# Patient Record
Sex: FEMALE | Race: WHITE | HISPANIC OR LATINO | Employment: STUDENT | URBAN - METROPOLITAN AREA
[De-identification: names, ages, dates, MRNs, and addresses within clinical notes are randomized per-mention and may not be internally consistent; named-entity substitution may affect disease eponyms.]

---
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
## 2018-01-02 ENCOUNTER — TELEPHONE (OUTPATIENT)
Dept: OBSTETRICS AND GYNECOLOGY | Facility: CLINIC | Age: 18
End: 2018-01-02

## 2018-01-02 DIAGNOSIS — N72 CERVICITIS: Primary | ICD-10-CM

## 2018-01-02 NOTE — TELEPHONE ENCOUNTER
Pt. Informed to come by the lab and drop off a urine for a repeat gc/ct .  She said okay and thank you

## 2018-01-10 ENCOUNTER — OFFICE VISIT (OUTPATIENT)
Dept: URGENT CARE | Facility: CLINIC | Age: 18
End: 2018-01-10
Payer: MEDICAID

## 2018-01-10 ENCOUNTER — LAB VISIT (OUTPATIENT)
Dept: LAB | Facility: HOSPITAL | Age: 18
End: 2018-01-10
Attending: OBSTETRICS & GYNECOLOGY
Payer: MEDICAID

## 2018-01-10 VITALS
SYSTOLIC BLOOD PRESSURE: 124 MMHG | DIASTOLIC BLOOD PRESSURE: 79 MMHG | HEART RATE: 99 BPM | HEIGHT: 66 IN | BODY MASS INDEX: 28.12 KG/M2 | OXYGEN SATURATION: 97 % | RESPIRATION RATE: 16 BRPM | WEIGHT: 175 LBS | TEMPERATURE: 99 F

## 2018-01-10 DIAGNOSIS — J02.9 SORE THROAT: ICD-10-CM

## 2018-01-10 DIAGNOSIS — N72 CERVICITIS: ICD-10-CM

## 2018-01-10 DIAGNOSIS — J02.9 PHARYNGITIS, UNSPECIFIED ETIOLOGY: Primary | ICD-10-CM

## 2018-01-10 LAB
CTP QC/QA: YES
FLUAV AG NPH QL: NEGATIVE
FLUBV AG NPH QL: NEGATIVE
HETEROPH AB SER QL: NEGATIVE
S PYO RRNA THROAT QL PROBE: NEGATIVE

## 2018-01-10 PROCEDURE — 87804 INFLUENZA ASSAY W/OPTIC: CPT | Mod: QW,S$GLB,, | Performed by: FAMILY MEDICINE

## 2018-01-10 PROCEDURE — 87880 STREP A ASSAY W/OPTIC: CPT | Mod: QW,S$GLB,, | Performed by: FAMILY MEDICINE

## 2018-01-10 PROCEDURE — 99214 OFFICE O/P EST MOD 30 MIN: CPT | Mod: S$GLB,,, | Performed by: FAMILY MEDICINE

## 2018-01-10 PROCEDURE — 87491 CHLMYD TRACH DNA AMP PROBE: CPT

## 2018-01-10 PROCEDURE — 86308 HETEROPHILE ANTIBODY SCREEN: CPT | Mod: QW,S$GLB,, | Performed by: FAMILY MEDICINE

## 2018-01-10 RX ORDER — DEXAMETHASONE SODIUM PHOSPHATE 100 MG/10ML
8 INJECTION INTRAMUSCULAR; INTRAVENOUS
Status: COMPLETED | OUTPATIENT
Start: 2018-01-10 | End: 2018-01-10

## 2018-01-10 RX ORDER — AZITHROMYCIN 250 MG/1
TABLET, FILM COATED ORAL
Qty: 6 TABLET | Refills: 0 | Status: SHIPPED | OUTPATIENT
Start: 2018-01-10 | End: 2018-04-11

## 2018-01-10 RX ADMIN — DEXAMETHASONE SODIUM PHOSPHATE 8 MG: 100 INJECTION INTRAMUSCULAR; INTRAVENOUS at 10:01

## 2018-01-10 NOTE — PATIENT INSTRUCTIONS
When You Have a Sore Throat    A sore throat can be painful. There are many reasons why you may have a sore throat. Your healthcare provider will work with you to find the cause of your sore throat. He or she will also find the best treatment for you.  What causes a sore throat?  Sore throats can be caused or worsened by:  · Cold or flu viruses  · Bacteria  · Irritants such as tobacco smoke or air pollution  · Acid reflux  A healthy throat  The tonsils are on the sides of the throat near the base of the tongue. They collect viruses and bacteria and help fight infection. The throat (pharynx) is the passage for air. Mucus from the nasal cavity also moves down the passage.  An inflamed throat  The tonsils and pharynx can become inflamed due to a cold or flu virus. Postnasal drip (excess mucus draining from the nasal cavity) can irritate the throat. It can also make the throat or tonsils more likely to be infected by bacteria. Severe, untreated tonsillitis in children or adults can cause a pocket of pus (abscess) to form near the tonsil.  Your evaluation  A medical evaluation can help find the cause of your sore throat. It can also help your healthcare provider choose the best treatment for you. The evaluation may include a health history, physical exam, and diagnostic tests.  Health history  Your healthcare provider may ask you the following:  · How long has the sore throat lasted and how have you been treating it?  · Do you have any other symptoms, such as body aches, fever, or cough?  · Does your sore throat recur? If so, how often? How many days of school or work have you missed because of a sore throat?  · Do you have trouble eating or swallowing?  · Have you been told that you snore or have other sleep problems?  · Do you have bad breath?  · Do you cough up bad-tasting mucus?  Physical exam  During the exam, your healthcare provider checks your ears, nose, and throat for problems. He or she also checks for  "swelling in the neck, and may listen to your chest.  Possible tests  Other tests your healthcare provider may perform include:  · A throat swab to check for bacteria such as streptococcus (the bacteria that causes strep throat)  · A blood test to check for mononucleosis (a viral infection)  · A chest X-ray to rule out pneumonia, especially if you have a cough  Treating a sore throat  Treatment depends on many factors. What is the likely cause? Is the problem recent? Does it keep coming back? In many cases, the best thing to do is to treat the symptoms, rest, and let the problem heal itself. Antibiotics may help clear up some bacterial infections. For cases of severe or recurring tonsillitis, the tonsils may need to be removed.  Relieving your symptoms  · Dont smoke, and avoid secondhand smoke.  · For children, try throat sprays or Popsicles. Adults and older children may try lozenges.  · Drink warm liquids to soothe the throat and help thin mucus. Avoid alcohol, spicy foods, and acidic drinks such as orange juice. These can irritate the throat.  · Gargle with warm saltwater (1 teaspoon of salt to 8 ounces of warm water).  · Use a humidifier to keep air moist and relieve throat dryness.  · Try over-the-counter pain relievers such as acetaminophen or ibuprofen. Use as directed, and dont exceed the recommended dose. Dont give aspirin to children.   Are antibiotics needed?  If your sore throat is due to a bacterial infection, antibiotics may speed healing and prevent complications. Although group A streptococcus ("strep throat" or GAS) is the major treatable infection for a sore throat, GAS causes only 5% to 15% of sore throats in adults who seek medical care. Most sore throats are caused by cold or flu viruses. And antibiotics dont treat viral illness. In fact, using antibiotics when theyre not needed may produce bacteria that are harder to kill. Your healthcare provider will prescribe antibiotics only if he or " she thinks they are likely to help.  If antibiotics are prescribed  Take the medicine exactly as directed. Be sure to finish your prescription even if youre feeling better. And be sure to ask your healthcare provider or pharmacist what side effects are common and what to do about them.  Is surgery needed?  In some cases, tonsils need to be removed. This is often done as outpatient (same-day) surgery. Your healthcare provider may advise removing the tonsils in cases of:  · Several severe bouts of tonsillitis in a year. Severe episodes include those that lead to missed days of school or work, or that need to be treated with antibiotics.  · Tonsillitis that causes breathing problems during sleep  · Tonsillitis caused by food particles collecting in pouches in the tonsils (cryptic tonsillitis)  Call your healthcare provider if any of the following occur:  · Symptoms worsen, or new symptoms develop.  · Swollen tonsils make breathing difficult.  · The pain is severe enough to keep you from drinking liquids.  · A skin rash, hives, or wheezing develops. Any of these could signal an allergic reaction to antibiotics.  · Symptoms dont improve within a week.  · Symptoms dont improve within 2 to 3 days of starting antibiotics.   Date Last Reviewed: 10/1/2016  © 9341-6473 "[employer]". [address] [place] [address], [place] [address]. All rights reserved. This information is not intended as a substitute for professional medical care. Always follow your healthcare professional's instructions.        Rosita was seen today for sore throat, headache and cough.    Diagnoses and all orders for this visit:    Pharyngitis, unspecified etiology  -     dexamethasone injection 8 mg; Inject 0.8 mLs (8 mg total) into the muscle one time.  -     azithromycin (ZITHROMAX Z-CHANTALE) 250 MG tablet; Take 2 tablets (500 mg) on  Day 1,  followed by 1 tablet (250 mg) once daily on Days 2 through 5.    Sore throat  -     POCT rapid strep A  -      POCT Infectious mononucleosis antibody  -     POCT Influenza A/B            Follow Up Comments   Make sure that you follow up with your primary care doctor in the next 2-5 days if needed .  Return to the Urgent Care if signs or symptoms change and certainly if you have worsening symptoms go to the nearest emergency department for further evaluation.     Natali Gallo MD

## 2018-01-10 NOTE — PROGRESS NOTES
"Subjective:       Patient ID: Rosita Dyson is a 17 y.o. female.    Vitals:  height is 5' 6" (1.676 m) and weight is 79.4 kg (175 lb). Her temperature is 99.1 °F (37.3 °C). Her blood pressure is 124/79 and her pulse is 99. Her respiration is 16 and oxygen saturation is 97%.     Chief Complaint: Sore Throat; Headache; and Cough    PT C/O A HEADACHE, COUGHING THAT IS MILDLY PRODUCTIVE, AND SORE THROAT. PT ALSO REPORTS VOMITING JUST ONCE 3 DAYS PRIOR AFTER EATING AND TAKING IBUPROFEN.      Sore Throat    This is a new problem. The current episode started yesterday. The problem has been unchanged. Neither side of throat is experiencing more pain than the other. Maximum temperature: 99.1. The fever has been present for 1 to 2 days. Associated symptoms include coughing, headaches and vomiting. Pertinent negatives include no abdominal pain, congestion, ear pain, hoarse voice or shortness of breath. She has tried nothing for the symptoms.   Headache    This is a new problem. The current episode started in the past 7 days. The problem occurs daily. The problem has been unchanged. The pain is located in the frontal and occipital region. The pain does not radiate. The quality of the pain is described as squeezing. The pain is moderate. Associated symptoms include coughing, a sore throat and vomiting. Pertinent negatives include no abdominal pain, ear pain, eye redness, fever or nausea. The symptoms are aggravated by coughing. Treatments tried: IBUPROFEN. The treatment provided no relief.   Cough   This is a new problem. The current episode started yesterday. The problem has been unchanged. The problem occurs hourly. The cough is productive of sputum. Associated symptoms include headaches and a sore throat. Pertinent negatives include no chest pain, chills, ear pain, eye redness, fever, myalgias, shortness of breath or wheezing. The symptoms are aggravated by lying down. She has tried nothing for the symptoms.     Review of " Systems   Constitution: Negative for chills, fever and malaise/fatigue.   HENT: Positive for sore throat. Negative for congestion, ear pain and hoarse voice.    Eyes: Negative for discharge and redness.   Cardiovascular: Negative for chest pain, dyspnea on exertion and leg swelling.   Respiratory: Positive for cough and sputum production. Negative for shortness of breath and wheezing.    Musculoskeletal: Negative for myalgias.   Gastrointestinal: Positive for vomiting. Negative for abdominal pain and nausea.   Neurological: Positive for headaches.       Objective:      Physical Exam   Constitutional: She is oriented to person, place, and time. She appears well-developed and well-nourished. She is cooperative.  Non-toxic appearance. She does not appear ill. No distress.   HENT:   Head: Normocephalic and atraumatic.   Right Ear: Hearing, tympanic membrane, external ear and ear canal normal.   Left Ear: Hearing, tympanic membrane, external ear and ear canal normal.   Nose: Nose normal. No mucosal edema, rhinorrhea or nasal deformity. No epistaxis. Right sinus exhibits no maxillary sinus tenderness and no frontal sinus tenderness. Left sinus exhibits no maxillary sinus tenderness and no frontal sinus tenderness.   Mouth/Throat: Uvula is midline and mucous membranes are normal. No trismus in the jaw. Normal dentition. No uvula swelling. Posterior oropharyngeal erythema present. Tonsils are 0 on the right. Tonsils are 0 on the left. Tonsillar exudate.   Eyes: Conjunctivae and lids are normal. No scleral icterus.   Sclera clear bilat   Neck: Trachea normal, full passive range of motion without pain and phonation normal. Neck supple.   Cardiovascular: Normal rate, regular rhythm, normal heart sounds, intact distal pulses and normal pulses.    Pulmonary/Chest: Effort normal and breath sounds normal. No respiratory distress.   Abdominal: Soft. Normal appearance and bowel sounds are normal. She exhibits no distension. There is  no tenderness.   Musculoskeletal: Normal range of motion. She exhibits no edema or deformity.   Neurological: She is alert and oriented to person, place, and time. She exhibits normal muscle tone. Coordination normal.   Skin: Skin is warm, dry and intact. She is not diaphoretic. No pallor.   Psychiatric: She has a normal mood and affect. Her speech is normal and behavior is normal. Judgment and thought content normal. Cognition and memory are normal.   Nursing note and vitals reviewed.      Assessment:       1. Pharyngitis, unspecified etiology    2. Sore throat        Plan:         Pharyngitis, unspecified etiology  -     dexamethasone injection 8 mg; Inject 0.8 mLs (8 mg total) into the muscle one time.  -     azithromycin (ZITHROMAX Z-CHANTALE) 250 MG tablet; Take 2 tablets (500 mg) on  Day 1,  followed by 1 tablet (250 mg) once daily on Days 2 through 5.  Dispense: 6 tablet; Refill: 0    Sore throat  -     POCT rapid strep A  -     POCT Infectious mononucleosis antibody  -     POCT Influenza A/B      Follow Up Comments   Make sure that you follow up with your primary care doctor in the next 2-5 days if needed .  Return to the Urgent Care if signs or symptoms change and certainly if you have worsening symptoms go to the nearest emergency department for further evaluation.

## 2018-01-11 LAB
C TRACH DNA SPEC QL NAA+PROBE: NOT DETECTED
N GONORRHOEA DNA SPEC QL NAA+PROBE: NOT DETECTED

## 2018-03-27 ENCOUNTER — OFFICE VISIT (OUTPATIENT)
Dept: URGENT CARE | Facility: CLINIC | Age: 18
End: 2018-03-27
Payer: MEDICAID

## 2018-03-27 VITALS
BODY MASS INDEX: 28.12 KG/M2 | RESPIRATION RATE: 18 BRPM | DIASTOLIC BLOOD PRESSURE: 90 MMHG | OXYGEN SATURATION: 98 % | WEIGHT: 175 LBS | TEMPERATURE: 98 F | HEART RATE: 78 BPM | SYSTOLIC BLOOD PRESSURE: 130 MMHG | HEIGHT: 66 IN

## 2018-03-27 DIAGNOSIS — J06.9 UPPER RESPIRATORY TRACT INFECTION, UNSPECIFIED TYPE: ICD-10-CM

## 2018-03-27 DIAGNOSIS — J02.9 SORE THROAT: ICD-10-CM

## 2018-03-27 DIAGNOSIS — J02.9 PHARYNGITIS, UNSPECIFIED ETIOLOGY: Primary | ICD-10-CM

## 2018-03-27 LAB
CTP QC/QA: YES
S PYO RRNA THROAT QL PROBE: NEGATIVE

## 2018-03-27 PROCEDURE — 99214 OFFICE O/P EST MOD 30 MIN: CPT | Mod: S$GLB,,, | Performed by: NURSE PRACTITIONER

## 2018-03-27 PROCEDURE — 87880 STREP A ASSAY W/OPTIC: CPT | Mod: QW,S$GLB,, | Performed by: NURSE PRACTITIONER

## 2018-03-27 RX ORDER — DEXAMETHASONE SODIUM PHOSPHATE 100 MG/10ML
6 INJECTION INTRAMUSCULAR; INTRAVENOUS ONCE
Status: COMPLETED | OUTPATIENT
Start: 2018-03-27 | End: 2018-03-27

## 2018-03-27 RX ORDER — IPRATROPIUM BROMIDE 21 UG/1
2 SPRAY, METERED NASAL 3 TIMES DAILY PRN
Qty: 30 ML | Refills: 0 | Status: SHIPPED | OUTPATIENT
Start: 2018-03-27 | End: 2018-03-31

## 2018-03-27 RX ADMIN — DEXAMETHASONE SODIUM PHOSPHATE 6 MG: 100 INJECTION INTRAMUSCULAR; INTRAVENOUS at 10:03

## 2018-03-27 NOTE — LETTER
March 27, 2018      [facility]  [place]  [place] [address]  Phone: [phone]  Fax: [phone]       Patient: Rosita Dyson   YOB: 2000  Date of Visit: 03/27/2018    To Whom It May Concern:    Harinder Dyson  was at [facility] on 03/27/2018. She may return to work/school on 3/29/2018 with no restrictions. If you have any questions or concerns, or if I can be of further assistance, please do not hesitate to contact me.    Sincerely,    Rena Ly NP

## 2018-03-27 NOTE — PATIENT INSTRUCTIONS
"Please follow up with your Primary care provider within 2-5 days if your signs and symptoms have not resolved or worsen.  The usual course of cold symptoms are 10-14 days.     If your condition worsens or fails to improve we recommend that you receive another evaluation at the emergency room immediately or contact your primary medical clinic to discuss your concerns.     You must understand that you have received an Urgent Care treatment only and that you may be released before all of your medical problems are known or treated.   You, the patient, will arrange for follow up care as instructed.     Tylenol or Ibuprofen can also be used as directed for pain/fever unless you have an allergy to them or medical condition such as stomach ulcers, kidney or liver disease or blood thinners etc for which you should not be taking these type of medications.     Take over the counter cough medication as directed as needed for cough.  You should avoid medications with pseudoephedrine or phenylephrine (any medication with "D") if you have high blood pressure as this can cause an elevation in your blood pressure. Instead consider Corcidin HBP as needed to prevent an elevated blood pressure.     Natural remedies of symptoms (as needed) include humidification, saline nasal sprays, and/or steamy showers.  Increase fluids, warm tea with honey, cough drops as needed.  You may also use salt water gargles for sore throat.      Viral Upper Respiratory Illness (Adult)  You have a viral upper respiratory illness (URI), which is another term for the common cold. This illness is contagious during the first few days. It is spread through the air by coughing and sneezing. It may also be spread by direct contact (touching the sick person and then touching your own eyes, nose, or mouth). Frequent handwashing will decrease risk of spread. Most viral illnesses go away within 7 to 10 days with rest and simple home remedies. Sometimes the illness may " last for several weeks. Antibiotics will not kill a virus, and they are generally not prescribed for this condition.    Home care  · If symptoms are severe, rest at home for the first 2 to 3 days. When you resume activity, don't let yourself get too tired.  · Avoid being exposed to cigarette smoke (yours or others).  · You may use acetaminophen or ibuprofen to control pain and fever, unless another medicine was prescribed. (Note: If you have chronic liver or kidney disease, have ever had a stomach ulcer or gastrointestinal bleeding, or are taking blood-thinning medicines, talk with your healthcare provider before using these medicines.) Aspirin should never be given to anyone under 18 years of age who is ill with a viral infection or fever. It may cause severe liver or brain damage.  · Your appetite may be poor, so a light diet is fine. Avoid dehydration by drinking 6 to 8 glasses of fluids per day (water, soft drinks, juices, tea, or soup). Extra fluids will help loosen secretions in the nose and lungs.  · Over-the-counter cold medicines will not shorten the length of time youre sick, but they may be helpful for the following symptoms: cough, sore throat, and nasal and sinus congestion. (Note: Do not use decongestants if you have high blood pressure.)  Follow-up care  Follow up with your healthcare provider, or as advised.  When to seek medical advice  Call your healthcare provider right away if any of these occur:  · Cough with lots of colored sputum (mucus)  · Severe headache; face, neck, or ear pain  · Difficulty swallowing due to throat pain  · Fever of 100.4°F (38°C)  Call 911, or get immediate medical care  Call emergency services right away if any of these occur:  · Chest pain, shortness of breath, wheezing, or difficulty breathing  · Coughing up blood  · Inability to swallow due to throat pain  Date Last Reviewed: 9/13/2015  © 6246-6421 [employer]. [address] [place] [address], [place] [address].  All rights reserved. This information is not intended as a substitute for professional medical care. Always follow your healthcare professional's instructions.

## 2018-03-27 NOTE — PROGRESS NOTES
"Subjective:       Patient ID: Rosita Dyson is a 17 y.o. female.    Vitals:  height is 5' 6" (1.676 m) and weight is 79.4 kg (175 lb). Her temperature is 98.3 °F (36.8 °C). Her blood pressure is 130/90 (abnormal) and her pulse is 78. Her respiration is 18 and oxygen saturation is 98%.     Chief Complaint: Sinus Problem    Sinus Problem   This is a new problem. Episode onset: 3 Days. The problem has been gradually worsening since onset. There has been no fever. Her pain is at a severity of 3/10. The pain is mild. Associated symptoms include congestion, coughing, ear pain, headaches and a sore throat. Pertinent negatives include no chills, hoarse voice or shortness of breath. Past treatments include nothing.     Review of Systems   Constitution: Negative for chills, fever and malaise/fatigue.   HENT: Positive for congestion, ear pain and sore throat. Negative for hoarse voice.    Eyes: Negative for discharge and redness.   Cardiovascular: Negative for chest pain, dyspnea on exertion and leg swelling.   Respiratory: Positive for cough and sputum production. Negative for shortness of breath and wheezing.    Musculoskeletal: Negative for myalgias.   Gastrointestinal: Negative for abdominal pain and nausea.   Neurological: Positive for headaches.       Objective:      Physical Exam   Constitutional: She is oriented to person, place, and time. She appears well-developed and well-nourished. She is cooperative.  Non-toxic appearance. She does not appear ill. No distress.   HENT:   Head: Normocephalic and atraumatic.   Right Ear: Hearing, tympanic membrane, external ear and ear canal normal.   Left Ear: Hearing, tympanic membrane, external ear and ear canal normal.   Nose: Mucosal edema and rhinorrhea present. No nasal deformity. No epistaxis. Right sinus exhibits no maxillary sinus tenderness and no frontal sinus tenderness. Left sinus exhibits no maxillary sinus tenderness and no frontal sinus tenderness. "   Mouth/Throat: Uvula is midline and mucous membranes are normal. No trismus in the jaw. Normal dentition. No uvula swelling. Posterior oropharyngeal erythema present. No oropharyngeal exudate or posterior oropharyngeal edema.   Eyes: Conjunctivae and lids are normal. No scleral icterus.   Sclera clear bilat   Neck: Trachea normal, full passive range of motion without pain and phonation normal. Neck supple.   Cardiovascular: Normal rate, regular rhythm, normal heart sounds, intact distal pulses and normal pulses.    Pulmonary/Chest: Effort normal and breath sounds normal. No respiratory distress. She has no decreased breath sounds. She has no wheezes. She has no rhonchi. She has no rales.   Abdominal: Soft. Normal appearance and bowel sounds are normal. She exhibits no distension. There is no tenderness.   Musculoskeletal: Normal range of motion. She exhibits no edema or deformity.   Lymphadenopathy:     She has cervical adenopathy.        Right cervical: Superficial cervical adenopathy present. No deep cervical and no posterior cervical adenopathy present.       Left cervical: Superficial cervical adenopathy present. No deep cervical and no posterior cervical adenopathy present.   Neurological: She is alert and oriented to person, place, and time. She exhibits normal muscle tone. Coordination normal.   Skin: Skin is warm, dry and intact. Capillary refill takes less than 2 seconds. She is not diaphoretic. No cyanosis. No pallor. Nails show no clubbing.   Psychiatric: She has a normal mood and affect. Her speech is normal and behavior is normal. Judgment and thought content normal. Cognition and memory are normal.   Nursing note and vitals reviewed.      Assessment:       1. Pharyngitis, unspecified etiology    2. Sore throat    3. Upper respiratory tract infection, unspecified type        Plan:         Pharyngitis, unspecified etiology  -     (Magic mouthwash) 1:1:1 Benadryl 12.5mg/5ml liq, aluminum & magnesium  "hydroxide-simehticone (Maalox), lidocaine viscous 2%; Swish and spit 10 mLs every 4 (four) hours as needed. Gargle and spit for sore throat  Dispense: 90 mL; Refill: 0    Sore throat  -     POCT rapid strep A  -     (Magic mouthwash) 1:1:1 Benadryl 12.5mg/5ml liq, aluminum & magnesium hydroxide-simehticone (Maalox), lidocaine viscous 2%; Swish and spit 10 mLs every 4 (four) hours as needed. Gargle and spit for sore throat  Dispense: 90 mL; Refill: 0    Upper respiratory tract infection, unspecified type  -     ipratropium (ATROVENT) 0.03 % nasal spray; 2 sprays by Nasal route 3 (three) times daily as needed. Use no more than 4 days.  Dispense: 30 mL; Refill: 0  -     dexamethasone injection 6 mg; Inject 0.6 mLs (6 mg total) into the muscle once.      Patient Instructions   Please follow up with your Primary care provider within 2-5 days if your signs and symptoms have not resolved or worsen.  The usual course of cold symptoms are 10-14 days.     If your condition worsens or fails to improve we recommend that you receive another evaluation at the emergency room immediately or contact your primary medical clinic to discuss your concerns.     You must understand that you have received an Urgent Care treatment only and that you may be released before all of your medical problems are known or treated.   You, the patient, will arrange for follow up care as instructed.     Tylenol or Ibuprofen can also be used as directed for pain/fever unless you have an allergy to them or medical condition such as stomach ulcers, kidney or liver disease or blood thinners etc for which you should not be taking these type of medications.     Take over the counter cough medication as directed as needed for cough.  You should avoid medications with pseudoephedrine or phenylephrine (any medication with "D") if you have high blood pressure as this can cause an elevation in your blood pressure. Instead consider Corcidin HBP as needed to prevent " an elevated blood pressure.     Natural remedies of symptoms (as needed) include humidification, saline nasal sprays, and/or steamy showers.  Increase fluids, warm tea with honey, cough drops as needed.  You may also use salt water gargles for sore throat.      Viral Upper Respiratory Illness (Adult)  You have a viral upper respiratory illness (URI), which is another term for the common cold. This illness is contagious during the first few days. It is spread through the air by coughing and sneezing. It may also be spread by direct contact (touching the sick person and then touching your own eyes, nose, or mouth). Frequent handwashing will decrease risk of spread. Most viral illnesses go away within 7 to 10 days with rest and simple home remedies. Sometimes the illness may last for several weeks. Antibiotics will not kill a virus, and they are generally not prescribed for this condition.    Home care  · If symptoms are severe, rest at home for the first 2 to 3 days. When you resume activity, don't let yourself get too tired.  · Avoid being exposed to cigarette smoke (yours or others).  · You may use acetaminophen or ibuprofen to control pain and fever, unless another medicine was prescribed. (Note: If you have chronic liver or kidney disease, have ever had a stomach ulcer or gastrointestinal bleeding, or are taking blood-thinning medicines, talk with your healthcare provider before using these medicines.) Aspirin should never be given to anyone under 18 years of age who is ill with a viral infection or fever. It may cause severe liver or brain damage.  · Your appetite may be poor, so a light diet is fine. Avoid dehydration by drinking 6 to 8 glasses of fluids per day (water, soft drinks, juices, tea, or soup). Extra fluids will help loosen secretions in the nose and lungs.  · Over-the-counter cold medicines will not shorten the length of time youre sick, but they may be helpful for the following symptoms: cough,  sore throat, and nasal and sinus congestion. (Note: Do not use decongestants if you have high blood pressure.)  Follow-up care  Follow up with your healthcare provider, or as advised.  When to seek medical advice  Call your healthcare provider right away if any of these occur:  · Cough with lots of colored sputum (mucus)  · Severe headache; face, neck, or ear pain  · Difficulty swallowing due to throat pain  · Fever of 100.4°F (38°C)  Call 911, or get immediate medical care  Call emergency services right away if any of these occur:  · Chest pain, shortness of breath, wheezing, or difficulty breathing  · Coughing up blood  · Inability to swallow due to throat pain  Date Last Reviewed: 9/13/2015  © 6488-7978 [employer]. [address] [place] [address], [place] [address]. All rights reserved. This information is not intended as a substitute for professional medical care. Always follow your healthcare professional's instructions.

## 2018-04-11 ENCOUNTER — OFFICE VISIT (OUTPATIENT)
Dept: FAMILY MEDICINE | Facility: HOSPITAL | Age: 18
End: 2018-04-11
Attending: FAMILY MEDICINE
Payer: MEDICAID

## 2018-04-11 VITALS
HEIGHT: 67 IN | DIASTOLIC BLOOD PRESSURE: 76 MMHG | SYSTOLIC BLOOD PRESSURE: 130 MMHG | HEART RATE: 96 BPM | WEIGHT: 213.63 LBS | BODY MASS INDEX: 33.53 KG/M2

## 2018-04-11 DIAGNOSIS — F32.A DEPRESSION, UNSPECIFIED DEPRESSION TYPE: ICD-10-CM

## 2018-04-11 DIAGNOSIS — Z76.89 ENCOUNTER TO ESTABLISH CARE WITH NEW DOCTOR: Primary | ICD-10-CM

## 2018-04-11 DIAGNOSIS — L91.0 KELOID: ICD-10-CM

## 2018-04-11 PROCEDURE — 99214 OFFICE O/P EST MOD 30 MIN: CPT | Performed by: FAMILY MEDICINE

## 2018-04-11 RX ORDER — FLUOXETINE HYDROCHLORIDE 20 MG/1
20 CAPSULE ORAL DAILY
Qty: 30 CAPSULE | Refills: 0 | Status: SHIPPED | OUTPATIENT
Start: 2018-04-11 | End: 2018-05-02

## 2018-04-11 RX ORDER — IPRATROPIUM BROMIDE 21 UG/1
SPRAY, METERED NASAL
COMMUNITY
Start: 2018-03-27 | End: 2018-06-05

## 2018-04-11 NOTE — PROGRESS NOTES
Subjective:       Patient ID: Rosita Dyson is a 17 y.o. female.    Chief Complaint: Nasal Congestion and Sore Throat    HPI   17 year old female with a history of congestion and sore throat that is improving after being seen in urgent care presents to [facility]. She states that she has been feeling depressed with increase in recent stressors. Her PHQ9 is 14 and her ANDREZ 7 is 4. She endorses insomnia, decreased appetite and has interest in starting an anti-depressant as well as seeing a Psychologist/Psychiatrist. She is also concerned about a keloid on the posterior aspect of her right ear that she would like removed. She denies any suicidal or homicidal ideation.    Review of Systems   Constitutional: Positive for appetite change. Negative for fever and unexpected weight change.   HENT: Positive for sore throat.    Eyes: Negative for visual disturbance.   Respiratory: Positive for cough (improving). Negative for shortness of breath and wheezing.    Cardiovascular: Negative for chest pain and palpitations.   Genitourinary: Negative for menstrual problem.   Musculoskeletal: Negative for back pain.   Neurological: Negative for headaches.   Psychiatric/Behavioral: Positive for dysphoric mood and sleep disturbance. Negative for agitation and suicidal ideas. The patient is nervous/anxious.        Objective:      Vitals:    04/11/18 1552   BP: 130/76   Pulse: 96     Physical Exam   Constitutional: She is oriented to person, place, and time. She appears well-developed and well-nourished.   HENT:   Head: Normocephalic and atraumatic.   Mouth/Throat: Oropharynx is clear and moist and mucous membranes are normal. No posterior oropharyngeal erythema or tonsillar abscesses. No tonsillar exudate.   Eyes: Pupils are equal, round, and reactive to light.   Neck: Normal range of motion.   Cardiovascular: Normal rate and regular rhythm.    No murmur heard.  Pulmonary/Chest: Effort normal. No respiratory distress. She has no  wheezes.   Abdominal: Soft. She exhibits no distension. There is no tenderness.   Musculoskeletal: Normal range of motion.   Lymphadenopathy:     She has no cervical adenopathy.   Neurological: She is alert and oriented to person, place, and time.   Skin: Skin is warm and dry. No rash noted.   Psychiatric: She has a normal mood and affect.       Assessment:       1. Encounter to establish care with new doctor    2. Keloid    3. Depression, unspecified depression type        Plan:       Encounter to establish care with new doctor    Keloid  -     Ambulatory referral to Dermatology    Depression, unspecified depression type  -     Ambulatory Referral to Psychology  -     FLUoxetine (PROZAC) 20 MG capsule; Take 1 capsule (20 mg total) by mouth once daily.  Dispense: 30 capsule; Refill: 0      Follow-up in about 2 weeks (around 4/25/2018).      Pt discussed with Dr. Holloway.    Vale Holguin MD

## 2018-05-02 ENCOUNTER — OFFICE VISIT (OUTPATIENT)
Dept: FAMILY MEDICINE | Facility: HOSPITAL | Age: 18
End: 2018-05-02
Payer: MEDICAID

## 2018-05-02 VITALS
WEIGHT: 212.31 LBS | BODY MASS INDEX: 33.32 KG/M2 | SYSTOLIC BLOOD PRESSURE: 111 MMHG | HEIGHT: 67 IN | HEART RATE: 75 BPM | DIASTOLIC BLOOD PRESSURE: 70 MMHG

## 2018-05-02 DIAGNOSIS — F33.0 MILD EPISODE OF RECURRENT MAJOR DEPRESSIVE DISORDER: ICD-10-CM

## 2018-05-02 DIAGNOSIS — F32.A DEPRESSION, UNSPECIFIED DEPRESSION TYPE: Primary | ICD-10-CM

## 2018-05-02 PROCEDURE — 99213 OFFICE O/P EST LOW 20 MIN: CPT | Performed by: FAMILY MEDICINE

## 2018-05-02 RX ORDER — MUPIROCIN 20 MG/G
OINTMENT TOPICAL
COMMUNITY
Start: 2018-04-19 | End: 2018-06-05

## 2018-05-02 RX ORDER — VENLAFAXINE 37.5 MG/1
37.5 TABLET ORAL 2 TIMES DAILY
Qty: 60 TABLET | Refills: 0 | Status: SHIPPED | OUTPATIENT
Start: 2018-05-02 | End: 2018-07-02

## 2018-05-02 RX ORDER — DOXYCYCLINE 100 MG/1
CAPSULE ORAL
COMMUNITY
Start: 2018-04-20 | End: 2018-05-02

## 2018-05-02 NOTE — PROGRESS NOTES
Subjective:       Patient ID: Rosita Dyson is a 17 y.o. female.    Chief Complaint: Follow-up and Difficulty focusing    HPI   17 year old female with a history of depression that presents for follow up. She would like to change medications as she feels that the Prozac is making her more tired. She is having problems concentrating in school at this time. She will see the Psychologist tomorrow.     Review of Systems   Constitutional: Negative for fever.   HENT: Negative for sore throat.    Eyes: Negative for visual disturbance.   Respiratory: Negative for shortness of breath and wheezing.    Cardiovascular: Negative for chest pain and palpitations.   Gastrointestinal: Negative for diarrhea and vomiting.   Skin: Negative for rash.   Neurological: Negative for headaches.   Psychiatric/Behavioral: Positive for decreased concentration and dysphoric mood.       Objective:      Vitals:    05/02/18 1349   BP: 111/70   Pulse: 75     Physical Exam   Constitutional: She is oriented to person, place, and time. She appears well-developed and well-nourished.   HENT:   Head: Normocephalic and atraumatic.   Eyes: Pupils are equal, round, and reactive to light.   Neck: Normal range of motion.   Cardiovascular: Normal rate and regular rhythm.    No murmur heard.  Pulmonary/Chest: Effort normal. No respiratory distress. She has no wheezes.   Abdominal: Soft. She exhibits no distension. There is no tenderness.   Musculoskeletal: Normal range of motion.   Neurological: She is alert and oriented to person, place, and time.   Skin: Skin is warm and dry.   Psychiatric: She has a normal mood and affect.       Assessment:       1. Depression, unspecified depression type        Plan:       Depression, unspecified depression type  -     venlafaxine (EFFEXOR) 37.5 MG Tab; Take 1 tablet (37.5 mg total) by mouth 2 (two) times daily.  Dispense: 60 tablet; Refill: 0      Follow-up in about 4 weeks (around 5/30/2018).      Pt discussed with   [place].    Vale Holguin MD

## 2018-05-03 NOTE — PROGRESS NOTES
I assume primary medical responsibility for this patient, I have reviewed the case history, findings, diagnosis and treatment plan with the resident and agree that the care is reasonable and necessary. This service has been performed by a resident without the presence of a teaching physician under the primary care exception  Mary Austin  5/3/2018

## 2018-05-21 ENCOUNTER — TELEPHONE (OUTPATIENT)
Dept: OBSTETRICS AND GYNECOLOGY | Facility: CLINIC | Age: 18
End: 2018-05-21

## 2018-05-21 DIAGNOSIS — Z30.8 ENCOUNTER FOR OTHER CONTRACEPTIVE MANAGEMENT: ICD-10-CM

## 2018-05-21 RX ORDER — NORGESTIMATE AND ETHINYL ESTRADIOL 7DAYSX3 LO
1 KIT ORAL DAILY
Qty: 90 TABLET | Refills: 12 | Status: SHIPPED | OUTPATIENT
Start: 2018-05-21 | End: 2019-07-11 | Stop reason: SDUPTHER

## 2018-05-21 NOTE — TELEPHONE ENCOUNTER
Patient is requesting refills on OCP.  She is due for an appointment.  Can you do 1 pack and then we will call her to schedule her annual.

## 2018-05-30 ENCOUNTER — OFFICE VISIT (OUTPATIENT)
Dept: URGENT CARE | Facility: CLINIC | Age: 18
End: 2018-05-30
Payer: MEDICAID

## 2018-05-30 VITALS
HEIGHT: 67 IN | OXYGEN SATURATION: 98 % | HEART RATE: 67 BPM | BODY MASS INDEX: 33.27 KG/M2 | WEIGHT: 212 LBS | TEMPERATURE: 98 F | SYSTOLIC BLOOD PRESSURE: 116 MMHG | DIASTOLIC BLOOD PRESSURE: 64 MMHG

## 2018-05-30 DIAGNOSIS — J32.9 SINUSITIS, UNSPECIFIED CHRONICITY, UNSPECIFIED LOCATION: Primary | ICD-10-CM

## 2018-05-30 DIAGNOSIS — R21 RASH: ICD-10-CM

## 2018-05-30 PROCEDURE — 99214 OFFICE O/P EST MOD 30 MIN: CPT | Mod: S$GLB,,, | Performed by: PHYSICIAN ASSISTANT

## 2018-05-30 RX ORDER — DEXAMETHASONE SODIUM PHOSPHATE 100 MG/10ML
10 INJECTION INTRAMUSCULAR; INTRAVENOUS
Status: COMPLETED | OUTPATIENT
Start: 2018-05-30 | End: 2018-05-30

## 2018-05-30 RX ORDER — PROMETHAZINE HYDROCHLORIDE AND DEXTROMETHORPHAN HYDROBROMIDE 6.25; 15 MG/5ML; MG/5ML
5 SYRUP ORAL NIGHTLY PRN
Qty: 60 ML | Refills: 0 | Status: SHIPPED | OUTPATIENT
Start: 2018-05-30 | End: 2018-07-02

## 2018-05-30 RX ORDER — AMOXICILLIN AND CLAVULANATE POTASSIUM 875; 125 MG/1; MG/1
1 TABLET, FILM COATED ORAL 2 TIMES DAILY
Qty: 14 TABLET | Refills: 0 | Status: SHIPPED | OUTPATIENT
Start: 2018-05-30 | End: 2018-06-05

## 2018-05-30 RX ORDER — BENZONATATE 100 MG/1
100 CAPSULE ORAL 3 TIMES DAILY PRN
Qty: 30 CAPSULE | Refills: 0 | Status: SHIPPED | OUTPATIENT
Start: 2018-05-30 | End: 2018-06-05

## 2018-05-30 RX ADMIN — DEXAMETHASONE SODIUM PHOSPHATE 10 MG: 100 INJECTION INTRAMUSCULAR; INTRAVENOUS at 12:05

## 2018-05-30 NOTE — PROGRESS NOTES
"Subjective:       Patient ID: Rosita Dyson is a 17 y.o. female.    Vitals:  height is 5' 6.5" (1.689 m) and weight is 96.2 kg (212 lb). Her temperature is 98.4 °F (36.9 °C). Her blood pressure is 116/64 and her pulse is 67. Her oxygen saturation is 98%.     Chief Complaint: Sore Throat    Sore Throat    This is a new problem. The current episode started in the past 7 days. The problem has been unchanged. Neither side of throat is experiencing more pain than the other. There has been no fever. The pain is at a severity of 7/10. The pain is moderate. Associated symptoms include coughing and headaches. Pertinent negatives include no abdominal pain, congestion, ear pain, hoarse voice or shortness of breath. She has tried nothing for the symptoms. The treatment provided no relief.     Review of Systems   Constitution: Negative for chills, fever and malaise/fatigue.   HENT: Positive for sore throat. Negative for congestion, ear pain and hoarse voice.    Eyes: Negative for discharge and redness.   Cardiovascular: Negative for chest pain, dyspnea on exertion and leg swelling.   Respiratory: Positive for cough. Negative for shortness of breath, sputum production and wheezing.    Musculoskeletal: Negative for myalgias.   Gastrointestinal: Negative for abdominal pain and nausea.   Neurological: Positive for headaches.       Objective:      Physical Exam   Constitutional: She is oriented to person, place, and time. Vital signs are normal. She appears well-developed and well-nourished. She does not appear ill. No distress.   HENT:   Head: Normocephalic and atraumatic.   Right Ear: Hearing, tympanic membrane, external ear and ear canal normal.   Left Ear: Hearing, tympanic membrane, external ear and ear canal normal.   Nose: Mucosal edema present. Right sinus exhibits maxillary sinus tenderness and frontal sinus tenderness. Left sinus exhibits maxillary sinus tenderness and frontal sinus tenderness.   Mouth/Throat: Uvula is " midline. Posterior oropharyngeal erythema present. No oropharyngeal exudate or posterior oropharyngeal edema.   Eyes: Conjunctivae, EOM and lids are normal. Right eye exhibits no discharge. Left eye exhibits no discharge.   Neck: Normal range of motion. Neck supple.   Cardiovascular: Normal rate, regular rhythm and normal heart sounds.  Exam reveals no gallop and no friction rub.    No murmur heard.  Pulmonary/Chest: Effort normal and breath sounds normal. No respiratory distress. She has no decreased breath sounds. She has no wheezes. She has no rhonchi. She has no rales.   Musculoskeletal: Normal range of motion.   Lymphadenopathy:        Head (right side): No submandibular and no tonsillar adenopathy present.        Head (left side): No submandibular and no tonsillar adenopathy present.   Neurological: She is alert and oriented to person, place, and time.   Skin: Skin is warm and dry. She is not diaphoretic. No erythema. No pallor.   1 x 1 cm dry scaling patch lateral to right eyebrow; no signs of bacterial or fungal infection; no erythema   Psychiatric: She has a normal mood and affect. Her behavior is normal.   Nursing note and vitals reviewed.      Assessment:       1. Sinusitis, unspecified chronicity, unspecified location    2. Rash        Plan:       Pt has a history of eczema - I believe patch near eye is eczema related. Discussed lotion pt should use. Discussed treatment plan for sinuses as well. Patient expressed verbal understanding and agreement with treatment plan.    Sinusitis, unspecified chronicity, unspecified location  -     dexamethasone injection 10 mg; Inject 1 mL (10 mg total) into the muscle one time.  -     benzonatate (TESSALON PERLES) 100 MG capsule; Take 1 capsule (100 mg total) by mouth 3 (three) times daily as needed for Cough.  Dispense: 30 capsule; Refill: 0  -     promethazine-dextromethorphan (PROMETHAZINE-DM) 6.25-15 mg/5 mL Syrp; Take 5 mLs by mouth nightly as needed.  Dispense:  60 mL; Refill: 0  -     amoxicillin-clavulanate 875-125mg (AUGMENTIN) 875-125 mg per tablet; Take 1 tablet by mouth 2 (two) times daily.  Dispense: 14 tablet; Refill: 0    Rash  -     dexamethasone injection 10 mg; Inject 1 mL (10 mg total) into the muscle one time.      Patient Instructions   -Please take antibiotic to completion.  -Apply unscented lotion to rash and follow up with dermatology if symptoms do not improve.  -Below are suggestions for symptomatic relief:   -Tylenol every 4 hours OR ibuprofen every 6 hours as needed for pain/fever.   -Salt water gargles to soothe throat pain.   -Chloroseptic spray also helps to numb throat pain.   -Nasal saline spray reduces inflammation and dryness.   -Warm face compresses to help with facial sinus pain/pressure.   -Vicks vapor rub at night.   -Flonase OTC or Nasacort OTC for nasal congestion.   -Simple foods like chicken noodle soup.   -Delsym helps with coughing at night   -Zyrtec/Claritin during the day & Benadryl at night may help with allergies.     If you DO NOT have Hypertension or any history of palpitations, it is ok to take over the counter Sudafed or Mucinex D or Allegra-D or Claritin-D or Zyrtec-D.  If you do take one of the above, it is ok to combine that with plain over the counter Mucinex or Allegra or Claritin or Zyrtec. If, for example, you are taking Zyrtec -D, you can combine that with Mucinex, but not Mucinex-D.  If you are taking Mucinex-D, you can combine that with plain Allegra or Claritin or Zyrtec.   If you DO have Hypertension or palpitations, it is safe to take Coricidin HBP for relief of sinus symptoms.    Please follow up with your primary care provider within 2-5 days if your signs and symptoms have not resolved or worsen.     If your condition worsens or fails to improve we recommend that you receive another evaluation at the emergency room immediately or contact your primary medical clinic to discuss your concerns.   You must understand  that you have received an Urgent Care treatment only and that you may be released before all of your medical problems are known or treated. You, the patient, will arrange for follow up care as instructed.

## 2018-05-30 NOTE — PATIENT INSTRUCTIONS
-Please take antibiotic to completion.  -Apply unscented lotion to rash and follow up with dermatology if symptoms do not improve.  -Below are suggestions for symptomatic relief:   -Tylenol every 4 hours OR ibuprofen every 6 hours as needed for pain/fever.   -Salt water gargles to soothe throat pain.   -Chloroseptic spray also helps to numb throat pain.   -Nasal saline spray reduces inflammation and dryness.   -Warm face compresses to help with facial sinus pain/pressure.   -Vicks vapor rub at night.   -Flonase OTC or Nasacort OTC for nasal congestion.   -Simple foods like chicken noodle soup.   -Delsym helps with coughing at night   -Zyrtec/Claritin during the day & Benadryl at night may help with allergies.     If you DO NOT have Hypertension or any history of palpitations, it is ok to take over the counter Sudafed or Mucinex D or Allegra-D or Claritin-D or Zyrtec-D.  If you do take one of the above, it is ok to combine that with plain over the counter Mucinex or Allegra or Claritin or Zyrtec. If, for example, you are taking Zyrtec -D, you can combine that with Mucinex, but not Mucinex-D.  If you are taking Mucinex-D, you can combine that with plain Allegra or Claritin or Zyrtec.   If you DO have Hypertension or palpitations, it is safe to take Coricidin HBP for relief of sinus symptoms.    Please follow up with your primary care provider within 2-5 days if your signs and symptoms have not resolved or worsen.     If your condition worsens or fails to improve we recommend that you receive another evaluation at the emergency room immediately or contact your primary medical clinic to discuss your concerns.   You must understand that you have received an Urgent Care treatment only and that you may be released before all of your medical problems are known or treated. You, the patient, will arrange for follow up care as instructed.

## 2018-06-05 ENCOUNTER — OFFICE VISIT (OUTPATIENT)
Dept: FAMILY MEDICINE | Facility: HOSPITAL | Age: 18
End: 2018-06-05
Attending: FAMILY MEDICINE
Payer: MEDICAID

## 2018-06-05 VITALS
BODY MASS INDEX: 33.26 KG/M2 | DIASTOLIC BLOOD PRESSURE: 70 MMHG | SYSTOLIC BLOOD PRESSURE: 112 MMHG | HEIGHT: 67 IN | WEIGHT: 211.88 LBS | HEART RATE: 83 BPM

## 2018-06-05 DIAGNOSIS — R10.11 RUQ PAIN: Primary | ICD-10-CM

## 2018-06-05 PROCEDURE — 99213 OFFICE O/P EST LOW 20 MIN: CPT | Performed by: FAMILY MEDICINE

## 2018-06-05 RX ORDER — VENLAFAXINE HYDROCHLORIDE 150 MG/1
CAPSULE, EXTENDED RELEASE ORAL
COMMUNITY
Start: 2018-05-31 | End: 2019-07-18

## 2018-06-05 NOTE — PROGRESS NOTES
Subjective:       Patient ID: Rosita Dyson is a 17 y.o. female.    Chief Complaint: Follow-up    HPI   17 year old female with a history of lap cholecystectomy in 5/2016 who states that she has been having pain in the ruq. She denies that is not associated with food.  She denies any pain today. She has no other concerns and has recently seen Psychiatry at [facility] who changed her rx for Effexor. She denies any problems with her current Effexor dose.     Review of Systems   Constitutional: Negative for fever.   HENT: Negative for sore throat.    Eyes: Negative for visual disturbance.   Respiratory: Negative for shortness of breath and wheezing.    Cardiovascular: Negative for chest pain and palpitations.   Gastrointestinal: Positive for abdominal pain. Negative for blood in stool, diarrhea and vomiting.   Skin: Negative for rash.   Neurological: Negative for headaches.   Psychiatric/Behavioral: Negative for behavioral problems.       Objective:      Vitals:    06/05/18 1115   BP: 112/70   Pulse: 83     Physical Exam   Constitutional: She is oriented to person, place, and time. She appears well-developed and well-nourished.   HENT:   Head: Normocephalic and atraumatic.   Neck: Normal range of motion.   Pulmonary/Chest: Effort normal. No respiratory distress. She has no wheezes.   Abdominal: Soft. She exhibits no distension. There is no tenderness.   Negative [place] sign   Musculoskeletal: Normal range of motion.   Neurological: She is alert and oriented to person, place, and time.   Skin: Skin is warm and dry.       Assessment:       1. RUQ pain        Plan:       RUQ pain  -     Comprehensive metabolic panel; Future; Expected date: 06/05/2018  -     Will contact Dr. Carlos to see if she would like any additional imaging at this time       Follow-up in about 3 months (around 9/5/2018).      Vale Holguin MD

## 2018-06-11 ENCOUNTER — OFFICE VISIT (OUTPATIENT)
Dept: SURGERY | Facility: CLINIC | Age: 18
End: 2018-06-11
Payer: MEDICAID

## 2018-06-11 DIAGNOSIS — Z90.49 S/P LAPAROSCOPIC CHOLECYSTECTOMY: ICD-10-CM

## 2018-06-11 DIAGNOSIS — R10.11 RUQ ABDOMINAL PAIN: Primary | ICD-10-CM

## 2018-06-11 PROCEDURE — 99214 OFFICE O/P EST MOD 30 MIN: CPT | Mod: S$PBB,,, | Performed by: SURGERY

## 2018-06-11 PROCEDURE — 99211 OFF/OP EST MAY X REQ PHY/QHP: CPT | Mod: PBBFAC | Performed by: SURGERY

## 2018-06-11 PROCEDURE — 99999 PR PBB SHADOW E&M-EST. PATIENT-LVL I: CPT | Mod: PBBFAC,,, | Performed by: SURGERY

## 2018-06-11 NOTE — PROGRESS NOTES
Subjective:       Patient ID: Rosita Dyson is a 17 y.o. female.    Chief Complaint: Pt is a 16 yo F who underwent lap jovani in 5/2016 for symptomatic cholelithiasis. She is here today for evaluation of intermittent RUQ pain. She states that she has continued to have intermittent episodes of sharp pain ever since her surgery. However, over the past several weeks, these episodes have worsened in intensity. She describes the pain as sharp and non-radiating and claims that she also experiences associated nausea, but no vomiting. On average, she believes she has these episodes about once every 2 weeks. They are random and not associated with eating. They last 10-15 min and go away on their own. Her LMP was May 18, and she does not believe these episodes are associated with her cycle. She reports a history of heart burn that varies in severity based on her diet, but she does not believe this is the same pain she has been experiencing. Her only current medication is Effexor, which she began one month ago.     PMH: depression, cholelithiasis  PSH: lap jovani 2016  Meds: Effexor  NKDA  SH: just finished 2nd yr at [employer]  FH: noncontributory    Review of Systems   Constitutional: Negative.  Negative for fever.   HENT: Negative.    Eyes: Negative.    Respiratory: Negative.    Cardiovascular: Negative.    Gastrointestinal: Positive for abdominal pain and nausea. Negative for blood in stool, constipation, diarrhea and vomiting.   Endocrine: Negative.    Genitourinary: Negative.    Musculoskeletal: Negative.    Allergic/Immunologic: Negative.    Neurological: Negative.    Hematological: Negative.    Psychiatric/Behavioral:        Depression   All other systems reviewed and are negative.      Objective:    Wgt 96.1 kg  Physical Exam   Constitutional: She is oriented to person, place, and time. She appears well-developed and well-nourished. No distress.   HENT:   Head: Normocephalic and atraumatic.   Eyes: Conjunctivae  are normal.   Neck: Normal range of motion. Neck supple.   Cardiovascular: Normal rate, regular rhythm and normal heart sounds.    Pulmonary/Chest: Effort normal. No respiratory distress.   Abdominal: Soft. Bowel sounds are normal. She exhibits no distension and no mass. There is no tenderness.   Well healed laparoscopy scars   Musculoskeletal: Normal range of motion.   Neurological: She is alert and oriented to person, place, and time.   Skin: Skin is warm and dry.   Psychiatric: She has a normal mood and affect. Her behavior is normal.       CMP 6/5/18: T bili 0.5, AST 18, ALT 12; otherwise WNL.    Assessment:       16 yo F with intermittent RUQ abdominal pain s/p lap jovani in 5/2016, not related to eating, lasting only 10-15 min at a time. Normal LFTs and bilirubin.     Plan:       - Given duration of symptoms, intermittent nature, and no relation to eating with a normal CMP, unlikely to be related to biliary process. Will check a RUQ US to assess biliary tree and rule out a retained stone, although that is very unlikely.   - Will call patient with results.   - If patient continues to have these symptoms and needs further workup, may need referral to GI.     Min Patel, PGY-2  _________________________________________    Pediatric Surgery Staff    I have seen and examined the patient and have edited the resident's note accordingly.        Alice Carlos

## 2018-06-11 NOTE — LETTER
[facility] - Pediatric Surgery  [address]  [facility] [address]  Phone: [phone]  Fax: [phone] June 11, 2018      Vale Holguin MD  [address] [place] [address] [address]  [facility] [address]    Patient: Rosita Dyson   MR Number: [id number]   YOB: 2000   Date of Visit: 6/11/2018     Dear Dr. Holguin:    Thank you for referring Rosita Dyson to me for evaluation. Below are the relevant portions of my assessment and plan of care.    Rosita is a 17-year-old female with intermittent RUQ abdominal pain status post lap jovani in 5/2016, not related to eating, lasting only 10-15 min at a time. Normal LFTs and bilirubin.     Given duration of symptoms, intermittent nature, and no relation to eating with a normal CMP, unlikely to be related to biliary process. Will check a RUQ US to assess biliary tree and rule out a retained stone, although that is very unlikely.     Will call patient with results. If patient continues to have these symptoms and needs further workup, may need referral to GI.     If you have questions, please do not hesitate to call me. I look forward to following Rosita along with you.    Sincerely,      Alice Carlos MD   Section of Pediatric General Surgery  [facility] [place] [place]    JLR/hcr

## 2018-07-02 ENCOUNTER — OFFICE VISIT (OUTPATIENT)
Dept: FAMILY MEDICINE | Facility: HOSPITAL | Age: 18
End: 2018-07-02
Attending: FAMILY MEDICINE
Payer: MEDICAID

## 2018-07-02 VITALS
WEIGHT: 211.19 LBS | HEIGHT: 66 IN | HEART RATE: 80 BPM | SYSTOLIC BLOOD PRESSURE: 106 MMHG | BODY MASS INDEX: 33.94 KG/M2 | DIASTOLIC BLOOD PRESSURE: 70 MMHG

## 2018-07-02 DIAGNOSIS — L30.1 ECZEMA, DYSHIDROTIC: Primary | ICD-10-CM

## 2018-07-02 DIAGNOSIS — R23.8 BLISTERS OF MULTIPLE SITES: ICD-10-CM

## 2018-07-02 PROCEDURE — 99213 OFFICE O/P EST LOW 20 MIN: CPT | Performed by: FAMILY MEDICINE

## 2018-07-02 RX ORDER — CLOTRIMAZOLE AND BETAMETHASONE DIPROPIONATE 10; .64 MG/G; MG/G
CREAM TOPICAL 2 TIMES DAILY
Qty: 15 G | Refills: 3 | Status: SHIPPED | OUTPATIENT
Start: 2018-07-02 | End: 2018-08-01

## 2018-07-02 NOTE — PROGRESS NOTES
Subjective:       Patient ID: Rosita Dyson is a 18 y.o. female.    Chief Complaint: Blister    Ms. Dyson is a 18 year old female who presents urgently for blisters. States she first noticed one spot and they started popping up all over. States they grow and pop, clear liquid comes out. Not painful, hasn't tried any medications. Never had a similar episode, not walking around barefoot outside. Denies f/chills. Denies medication changes when this started.     Denies history of HTN/DM. Takes effexor for anxiety, has been helping.       Review of Systems   Constitutional: Negative for chills and fever.   HENT: Negative for congestion and sore throat.    Eyes: Negative for pain and redness.   Respiratory: Negative for cough and shortness of breath.    Cardiovascular: Negative for chest pain and palpitations.   Gastrointestinal: Negative for diarrhea, nausea and vomiting.   Genitourinary: Negative for difficulty urinating and dysuria.   Musculoskeletal: Negative for arthralgias and myalgias.   Skin: Positive for rash.   Neurological: Negative for syncope and headaches.   Psychiatric/Behavioral: Negative for agitation and confusion.       Objective:      Vitals:    07/02/18 1405   BP: 106/70   Pulse: 80     Physical Exam   Constitutional: She is oriented to person, place, and time. She appears well-developed and well-nourished. No distress.   HENT:   Head: Normocephalic and atraumatic.   Right Ear: External ear normal.   Left Ear: External ear normal.   Eyes: Conjunctivae are normal. Pupils are equal, round, and reactive to light. Right eye exhibits no discharge. Left eye exhibits no discharge.   Neck: Normal range of motion. Neck supple.   Cardiovascular: Normal rate and regular rhythm.  Exam reveals no gallop and no friction rub.    No murmur heard.  Pulmonary/Chest: Effort normal and breath sounds normal. No respiratory distress. She has no wheezes. She has no rales.   Musculoskeletal: Normal range of motion.    Neurological: She is alert and oriented to person, place, and time. She has normal reflexes.   Skin: Skin is warm. No rash noted. No erythema.   Bottom of feet- areas of scaling, some blistering skin s/p rupture, no obvious warts.    Psychiatric: She has a normal mood and affect. Her behavior is normal.       Assessment:       1. Eczema, dyshidrotic    2. Blisters of multiple sites        Plan:       Eczema, dyshidrotic    Blisters of multiple sites  Comments:  Feet  Orders:  -     clotrimazole-betamethasone 1-0.05% (LOTRISONE) cream; Apply topically 2 (two) times daily. To areas of blistering  Dispense: 15 g; Refill: 3      Follow-up in about 2 weeks (around 7/16/2018).

## 2018-07-03 NOTE — PROGRESS NOTES
I assume primary medical responsibility for this patient, I have reviewed the case history, findings, diagnosis and treatment plan with the resident and agree that the care is reasonable and necessary. This service has been performed by a resident without the presence of a teaching physician under the primary care exception  Mary Austin  7/3/2018

## 2018-07-31 ENCOUNTER — OFFICE VISIT (OUTPATIENT)
Dept: URGENT CARE | Facility: CLINIC | Age: 18
End: 2018-07-31
Payer: MEDICAID

## 2018-07-31 VITALS
HEART RATE: 94 BPM | WEIGHT: 208 LBS | TEMPERATURE: 98 F | OXYGEN SATURATION: 96 % | SYSTOLIC BLOOD PRESSURE: 143 MMHG | DIASTOLIC BLOOD PRESSURE: 74 MMHG | HEIGHT: 66 IN | RESPIRATION RATE: 18 BRPM | BODY MASS INDEX: 33.43 KG/M2

## 2018-07-31 DIAGNOSIS — R50.9 FEVER IN ADULT: Primary | ICD-10-CM

## 2018-07-31 LAB
BILIRUB UR QL STRIP: POSITIVE
CTP QC/QA: YES
GLUCOSE UR QL STRIP: NEGATIVE
KETONES UR QL STRIP: NEGATIVE
LEUKOCYTE ESTERASE UR QL STRIP: POSITIVE
PH, POC UA: 5.5 (ref 5–8)
POC BLOOD, URINE: POSITIVE
POC NITRATES, URINE: NEGATIVE
PROT UR QL STRIP: NEGATIVE
S PYO RRNA THROAT QL PROBE: NEGATIVE
SP GR UR STRIP: >=1.03 (ref 1–1.03)
UROBILINOGEN UR STRIP-ACNC: NORMAL (ref 0.1–1.1)

## 2018-07-31 PROCEDURE — 81003 URINALYSIS AUTO W/O SCOPE: CPT | Mod: QW,S$GLB,, | Performed by: FAMILY MEDICINE

## 2018-07-31 PROCEDURE — 99214 OFFICE O/P EST MOD 30 MIN: CPT | Mod: 25,S$GLB,, | Performed by: FAMILY MEDICINE

## 2018-07-31 PROCEDURE — 87880 STREP A ASSAY W/OPTIC: CPT | Mod: QW,S$GLB,, | Performed by: FAMILY MEDICINE

## 2018-07-31 NOTE — PATIENT INSTRUCTIONS
"  Headache, Unspecified    A number of things can cause headaches. The cause of your headache isnt clear. But it doesnt seem to be a sign of any serious illness.  You could have a tension headache or a migraine headache.  Stress can cause a tension headache. This can happen if you tense the muscles of your shoulders, neck, and scalp without knowing it. If this stress lasts long enough, you may develop a tension headache.  It is not clear why migraines occur, but certain things called" triggers" can raise the risk of having a migraine attack. Migraine triggers may include emotional stress or depression, or by hormone changes during the menstrual cycle. Other triggers include birth control pills and other medicines, alcohol or caffeine, foods with tyramine (such as aged cheese, wine), eyestrain, weather changes, missed meals, and lack of sleep or oversleeping.  Other causes of headache include:  · Viral illness with high fever  · Head injury with concussion  · Sinus, ear, or throat infection  · Dental pain and jaw joint (TMJ) pain  More serious but less common causes of headache include stroke, brain hemorrhage, brain tumor, meningitis, and encephalitis.  Home care  Follow these tips when taking care of yourself at home:  · Dont drive yourself home if you were given pain medicine for your headache. Instead, have someone else drive you home. Try to sleep when you get home. You should feel much better when you wake up.  · Apply heat to the back of your neck to ease a neck muscle spasm. Take care of a migraine headache by putting an ice pack on your forehead or at the base of your skull.  · If you have nausea or vomiting, eat a light diet until your headache eases.  · If you have a migraine headache, use sunglasses when in the daylight or around bright indoor lighting until your symptoms get better. Bright glaring light can make this type of headache worse.  Follow-up care  Follow up with your healthcare provider, or " as advised. Talk with your provider if you have frequent headaches. He or she can help figure out a treatment plan. By knowing the earliest signs of headache, and starting treatment right away, you may be able to stop the pain yourself.  When to seek medical advice  Call your healthcare provider right away if any of these occur:  · Your head pain suddenly gets worse after sexual intercourse or strenuous activity  · Your head pain doesnt get better within 24 hours  · You arent able to keep liquids down (repeated vomiting)  · Fever of 100.4ºF (38ºC) or higher, or as directed by your healthcare provider  · Stiff neck  · Extreme drowsiness, confusion, or fainting  · Dizziness or dizziness with spinning sensation (vertigo)  · Weakness in an arm or leg or one side of your face  · You have trouble talking or seeing  Date Last Reviewed: 8/1/2016  © 3423-7660 [employer]. [address] [place] [address], [place] [address]. All rights reserved. This information is not intended as a substitute for professional medical care. Always follow your healthcare professional's instructions.

## 2018-07-31 NOTE — PROGRESS NOTES
"Subjective:       Patient ID: Rosita Dyson is a 18 y.o. female.    Vitals:  height is 5' 6" (1.676 m) and weight is 94.3 kg (208 lb). Her temperature is 98.4 °F (36.9 °C). Her blood pressure is 143/74 (abnormal) and her pulse is 94. Her respiration is 18 and oxygen saturation is 96%.     Chief Complaint: Headache    19 y/o female with c/o fever upto 104 at 4am this am, denies sore throat, no uti sx, also c/o headache off and on x 3 days, nom hx of migraine.denies uti sx.      Headache    This is a new problem. Episode onset: 3 Days. The problem occurs constantly. The problem has been unchanged. The pain is located in the frontal region. The pain radiates to the left neck and right neck. The pain quality is similar to prior headaches. The quality of the pain is described as pulsating and throbbing. The pain is at a severity of 7/10. The pain is moderate. Associated symptoms include a fever. Pertinent negatives include no blurred vision, dizziness, nausea, neck pain, numbness, photophobia, seizures, tinnitus, vomiting or weakness. Nothing aggravates the symptoms. She has tried nothing for the symptoms. The treatment provided no relief.     Review of Systems   Constitution: Positive for fever. Negative for chills and weakness.   HENT: Negative for congestion and tinnitus.    Eyes: Negative for blurred vision and photophobia.   Skin: Negative for rash.   Musculoskeletal: Negative for neck pain.   Gastrointestinal: Negative for nausea and vomiting.   Neurological: Positive for headaches. Negative for disturbances in coordination, dizziness, numbness and seizures.   Psychiatric/Behavioral: Negative for altered mental status. The patient is not nervous/anxious.        Objective:      Physical Exam   Constitutional: She is oriented to person, place, and time. She appears well-developed and well-nourished. She is cooperative.  Non-toxic appearance. She does not appear ill. No distress.   Non ill appearing female in " nad     HENT:   Head: Normocephalic and atraumatic.   Right Ear: Hearing, tympanic membrane, external ear and ear canal normal.   Left Ear: Hearing, tympanic membrane, external ear and ear canal normal.   Nose: Nose normal. No mucosal edema, rhinorrhea or nasal deformity. No epistaxis. Right sinus exhibits no maxillary sinus tenderness and no frontal sinus tenderness. Left sinus exhibits no maxillary sinus tenderness and no frontal sinus tenderness.   Mouth/Throat: Uvula is midline, oropharynx is clear and moist and mucous membranes are normal. No trismus in the jaw. Normal dentition. No uvula swelling. No posterior oropharyngeal erythema.   No LAP     Eyes: Conjunctivae and lids are normal. Right eye exhibits no discharge. Left eye exhibits no discharge. No scleral icterus.   Sclera clear bilat   Neck: Trachea normal, normal range of motion, full passive range of motion without pain and phonation normal. Neck supple.   Cardiovascular: Normal rate, regular rhythm, normal heart sounds, intact distal pulses and normal pulses.  Exam reveals no gallop and no friction rub.    No murmur heard.  Pulmonary/Chest: Effort normal and breath sounds normal. She has no wheezes. She has no rales.   Abdominal: Soft. Normal appearance and bowel sounds are normal. She exhibits no distension, no pulsatile midline mass and no mass. There is no tenderness.   Musculoskeletal: Normal range of motion. She exhibits no edema or deformity.   Lymphadenopathy:     She has no cervical adenopathy.   Neurological: She is alert and oriented to person, place, and time. She displays normal reflexes. No cranial nerve deficit. She exhibits normal muscle tone.   Skin: Skin is warm, dry and intact. She is not diaphoretic. No pallor.   Psychiatric: She has a normal mood and affect. Her speech is normal and behavior is normal. Judgment and thought content normal. Cognition and memory are normal.   Nursing note and vitals reviewed.      Assessment:       1.  Fever in adult        Plan:         Fever in adult  -     POCT rapid strep A  -     POCT Urinalysis, Dipstick, Automated, W/O Scope        Since pt complaints of 104 fever and headache x 3 crystal, refer to ER for further worjk up. Strep and US negative

## 2018-08-01 ENCOUNTER — OFFICE VISIT (OUTPATIENT)
Dept: FAMILY MEDICINE | Facility: HOSPITAL | Age: 18
End: 2018-08-01
Attending: FAMILY MEDICINE
Payer: MEDICAID

## 2018-08-01 VITALS
HEIGHT: 66 IN | DIASTOLIC BLOOD PRESSURE: 64 MMHG | HEART RATE: 77 BPM | BODY MASS INDEX: 33.73 KG/M2 | WEIGHT: 209.88 LBS | TEMPERATURE: 99 F | SYSTOLIC BLOOD PRESSURE: 103 MMHG

## 2018-08-01 DIAGNOSIS — J06.9 VIRAL UPPER RESPIRATORY TRACT INFECTION: Primary | ICD-10-CM

## 2018-08-01 DIAGNOSIS — Z02.89 ENCOUNTER TO OBTAIN EXCUSE FROM SCHOOL: ICD-10-CM

## 2018-08-01 PROCEDURE — 99213 OFFICE O/P EST LOW 20 MIN: CPT | Performed by: STUDENT IN AN ORGANIZED HEALTH CARE EDUCATION/TRAINING PROGRAM

## 2018-08-01 NOTE — PROGRESS NOTES
Subjective:       Patient ID: Rosita Dyson is a 18 y.o. female.    Chief Complaint: Headache and Urinary Tract Infection    HPI       Headache for 3 days, no sinus, and a congestions. Subjective fever, she took advil last night. LMP was 3 days. No symptoms of UTI. She said she has all her meninintis vacc. She is in college now. She is living with dad, brother 4YO also has fever and similar symptoms she has.      Review of Systems   Constitutional: Positive for fever. Negative for activity change, appetite change, chills, diaphoresis and fatigue.   HENT: Positive for congestion. Negative for hearing loss, sinus pain, sinus pressure, sneezing and sore throat.    Eyes: Negative for visual disturbance.   Respiratory: Negative for apnea, cough, chest tightness, shortness of breath and wheezing.    Cardiovascular: Negative for chest pain, palpitations and leg swelling.   Gastrointestinal: Negative for abdominal distention, abdominal pain, anal bleeding, blood in stool, constipation, diarrhea, nausea and vomiting.   Endocrine: Negative for polyuria.   Genitourinary: Negative for difficulty urinating, dysuria and hematuria.         Objective:      Vitals:    08/01/18 1017   BP: 103/64   Pulse: 77   Temp: 99.1 °F (37.3 °C)     Physical Exam   Constitutional: She is oriented to person, place, and time. She appears well-developed and well-nourished.   HENT:   Head: Normocephalic and atraumatic.   Nose: Nose normal.   Mouth/Throat: Oropharynx is clear and moist. No oropharyngeal exudate.   Eyes: Conjunctivae and EOM are normal. Right eye exhibits no discharge. Left eye exhibits no discharge. No scleral icterus.   Neck: Normal range of motion. Neck supple. No JVD present.   Cardiovascular: Normal rate, regular rhythm, normal heart sounds and intact distal pulses.  Exam reveals no gallop and no friction rub.    No murmur heard.  Pulmonary/Chest: Effort normal and breath sounds normal. No respiratory distress. She has no  wheezes. She has no rales. She exhibits no tenderness.   Abdominal: Soft. Bowel sounds are normal. She exhibits no distension and no mass. There is no tenderness. There is no rebound and no guarding.   Musculoskeletal: Normal range of motion. She exhibits no edema, tenderness or deformity.   Neurological: She is alert and oriented to person, place, and time.   Kernig and brusinski neg   Skin: Skin is warm. No rash noted. She is not diaphoretic. No erythema. No pallor.   Psychiatric: She has a normal mood and affect. Her behavior is normal. Judgment and thought content normal.   Nursing note and vitals reviewed.        Assessment:       1. Viral upper respiratory tract infection    2. Encounter to obtain excuse from school        Plan:       Viral upper respiratory tract infection  Continue tylenol and advil as needed as prescribed.  Hydration and supportive care      Encounter to obtain excuse from school  Given to patient for today's visit.    Follow-up in about 2 weeks (around 8/15/2018) for URI symptoms.

## 2018-08-01 NOTE — LETTER
August 1, 2018      [facility] [address], [address]  [place] [address]  Phone: [phone]  Fax: [phone]       Patient: Rosita Dyson   YOB: 2000  Date of Visit: 08/01/2018    To Whom It May Concern:    Harinder Dyson  was at [facility] on 08/01/2018. She is seen today on 8/1/18 and she may return to work/school on 8/2/18 without restrictions. If you have any questions or concerns, or if I can be of further assistance, please do not hesitate to contact me.    Sincerely,    Geneva Carr MD

## 2018-08-26 NOTE — PROGRESS NOTES
Case discussed with resident at time of visit.  I have reviewed and concur with the resident's evaluation, assessment, and plan.  Advised symptomatic Rx for viral URI.

## 2018-11-26 ENCOUNTER — OFFICE VISIT (OUTPATIENT)
Dept: OBSTETRICS AND GYNECOLOGY | Facility: CLINIC | Age: 18
End: 2018-11-26
Payer: MEDICAID

## 2018-11-26 VITALS
DIASTOLIC BLOOD PRESSURE: 80 MMHG | HEIGHT: 66 IN | BODY MASS INDEX: 33.87 KG/M2 | WEIGHT: 210.75 LBS | SYSTOLIC BLOOD PRESSURE: 120 MMHG

## 2018-11-26 DIAGNOSIS — L73.9 FOLLICULITIS: Primary | ICD-10-CM

## 2018-11-26 PROCEDURE — 99999 PR PBB SHADOW E&M-EST. PATIENT-LVL III: CPT | Mod: PBBFAC,,, | Performed by: OBSTETRICS & GYNECOLOGY

## 2018-11-26 PROCEDURE — 99213 OFFICE O/P EST LOW 20 MIN: CPT | Mod: S$PBB,,, | Performed by: OBSTETRICS & GYNECOLOGY

## 2018-11-26 PROCEDURE — 99213 OFFICE O/P EST LOW 20 MIN: CPT | Mod: PBBFAC,PO | Performed by: OBSTETRICS & GYNECOLOGY

## 2018-11-26 RX ORDER — MUPIROCIN 20 MG/G
OINTMENT TOPICAL 3 TIMES DAILY
Qty: 1 TUBE | Refills: 0 | Status: SHIPPED | OUTPATIENT
Start: 2018-11-26 | End: 2019-02-20

## 2018-11-26 NOTE — PROGRESS NOTES
"GYNECOLOGY  :  Rosita Dyson is a 18 y.o.    Here today for  Evaluation  Noticed last night a small "bump" on left breast, slightly  Painful , red., swollen   Has not had something like that before.     No gyn symptoms   Dr Schmidt patient, saw her this year .       History reviewed. No pertinent past medical history.  Past Surgical History:   Procedure Laterality Date    CHOLECYSTECTOMY      CHOLECYSTECTOMY-LAPAROSCOPIC N/A 2016    Performed by [facility] at [facility] OR 2ND FLR    TONSILLECTOMY      TONSILLECTOMY Bilateral 3/27/2017    Performed by Savage Mcleod MD at [facility] OR 1ST FLR    WISDOM TOOTH EXTRACTION Bilateral      Family History   Problem Relation Age of Onset    Hypertension Paternal Grandmother     No Known Problems Mother     No Known Problems Father     Amblyopia Neg Hx     Blindness Neg Hx     Cataracts Neg Hx     Glaucoma Neg Hx     Macular degeneration Neg Hx     Retinal detachment Neg Hx     Strabismus Neg Hx     Asthma Neg Hx     Birth defects Neg Hx     Cancer Neg Hx     Chromosomal disorder Neg Hx     Diabetes Neg Hx     Early death Neg Hx     Heart disease Neg Hx     Hyperlipidemia Neg Hx     Mental illness Neg Hx     Seizures Neg Hx     Thyroid disease Neg Hx     Other Neg Hx      Social History     Tobacco Use    Smoking status: Never Smoker    Smokeless tobacco: Never Used   Substance Use Topics    Alcohol use: No    Drug use: No     OB History    Para Term  AB Living   0 0 0 0 0 0   SAB TAB Ectopic Multiple Live Births   0 0 0 0               /80   Ht 5' 6" (1.676 m)   Wt 95.6 kg (210 lb 12.2 oz)   LMP 2018   BMI 34.02 kg/m²     Last PAP=n/a  LMP = 18  Last Mammogram = n/a  Last Colonoscopy  =n/a      COMPREHENSIVE GYN HISTORY:  G  0P0     PAP History: Denies abnormal Paps.  Infection History: Denies STDs. Denies PID.  Benign History: Denies uterine fibroids. Denies ovarian cysts. Denies " endometriosis.   Cancer History: Denies cervical cancer. Denies uterine cancer or hyperplasia. Denies ovarian cancer. Denies vulvar cancer or pre-cancer. Denies vaginal cancer or pre-cancer. Denies breast cancer. Denies colon cancer.  Sexual Activity History: (x  ) Yes   (  )   no       ROS  GENERAL: Denies significant weight gain or weight loss. Feeling well overall.  SKIN: Denies rash or lesions.  Normal skin turgor  HEAD: Denies head injury or headache.   NODES: Denies enlarged lymph nodes.   CHEST: Denies chest pain or shortness of breath.   CARDIOVASCULAR: Denies palpitations or left sided chest pain.   ABDOMEN: No abdominal pain,no  diarrhea,constipation  nausea, vomiting or rectal bleeding.   URINARY: normal  Frequency,no  Dysuria or burning on urination.   REPRODUCTIVE: Per HPI   BREASTS: The patient sometimes performs breast self-examination and denies pain, lumps, or nipple discharge.   HEMATOLOGIC: No easy bruisability or excessive bleeding.   MUSCULOSKELETAL: Denies joint pain or swelling.   NEUROLOGIC: Denies syncope or weakness.   PSYCHIATRIC: Denies depression, anxiety or mood swings.    Physical Exam     Constitutional: She is oriented to person, place, and time. She appears well-developed and well-nourished. No distress.   HENT:   Head: Normocephalic.   NECK: Neck symmetric without masses or thyromegaly.  Cardiovascular: Normal rate and regular rhythm.   Pulmonary/Chest: Effort normal and breath sounds normal. No respiratory distress. She has no wheezes.   Breasts: On the left breast a 1 cm  Erythematous skin  lesion ,within the areola  non purulent secretion   Abdominal: Soft not distended. Bowel sounds are normal. She exhibits   no masses . No tenderness to palpation.   Genitourinary: Pelvic exam was performed with patient supine.   External genitalia normal no lesions.Normal hair distribution   Adequate perineal body,normal urethral meatus   Vagina moist and well rugated without lesions, no  vaginal  Discharge.   Cervix pink and without lesions. No bleeding. No significant cystocele or rectocele.  Bimanual exam showed uterus normal size, shape and position , mobile and nontender. Adnexa without masses or tenderness. Urethra and bladder normal  Extremities normal no cyanosis ,edema.         A/P Rosita Dyson 18 y.o.     Dx=  1- Breast skin folliculitis   2-  3-    Plan:  Bactroban  Topical     Patient was counseled today on A.C.S. Pap guidelines and recommendations for yearly pelvic exams, mammograms and monthly self breast exams; to see her PCP for other health maintenance, nutrition and weight gain counseling, discussed lab values.  Discussed colonoscopy recommendations every 10 years starting at age 50   Calcium and vit D daily intake     F/u in 1 week or PRN     James Faith M.D.   OB/GYN

## 2019-02-20 ENCOUNTER — OFFICE VISIT (OUTPATIENT)
Dept: FAMILY MEDICINE | Facility: HOSPITAL | Age: 19
End: 2019-02-20
Payer: MEDICAID

## 2019-02-20 VITALS
WEIGHT: 209.88 LBS | SYSTOLIC BLOOD PRESSURE: 131 MMHG | DIASTOLIC BLOOD PRESSURE: 72 MMHG | BODY MASS INDEX: 32.94 KG/M2 | HEIGHT: 67 IN | HEART RATE: 88 BPM | TEMPERATURE: 98 F

## 2019-02-20 DIAGNOSIS — R11.11 NON-INTRACTABLE VOMITING WITHOUT NAUSEA, UNSPECIFIED VOMITING TYPE: Primary | ICD-10-CM

## 2019-02-20 PROCEDURE — 99213 OFFICE O/P EST LOW 20 MIN: CPT | Performed by: FAMILY MEDICINE

## 2019-02-20 RX ORDER — ONDANSETRON 4 MG/1
4 TABLET, ORALLY DISINTEGRATING ORAL EVERY 6 HOURS PRN
Qty: 30 TABLET | Refills: 1 | Status: SHIPPED | OUTPATIENT
Start: 2019-02-20 | End: 2019-07-18

## 2019-02-20 RX ORDER — ACETAMINOPHEN AND CODEINE PHOSPHATE 300; 30 MG/1; MG/1
TABLET ORAL
Refills: 0 | COMMUNITY
Start: 2019-02-05 | End: 2019-07-18

## 2019-02-20 RX ORDER — IBUPROFEN 600 MG/1
TABLET ORAL
Refills: 0 | COMMUNITY
Start: 2019-01-24 | End: 2019-07-18

## 2019-02-20 NOTE — PROGRESS NOTES
Subjective:       Patient ID: Rosita Dyson is a 18 y.o. female.    Chief Complaint: Emesis    Ms. Dyson is a 18 year old female     Patient reports she has been vomiting after one drink for the past three weeks. States she has been drinking vodka with water and MEO, +V after about 30 minutes, clear. Tried a couple different vodkas. States she has been drinking for years without any issue (college student). Hasn't had any recent gastroenteritis. Last drink without V was four weeks ago.     Has been on the same medication for the past year. Hasn't been on abx    Not sexually active and has been on Ortho Tri-Cyclen.       Review of Systems   Constitutional: Negative for chills and fever.   Gastrointestinal: Positive for vomiting. Negative for nausea.       Objective:      Vitals:    02/20/19 1445   BP: 131/72   Pulse: 88   Temp: 98.4 °F (36.9 °C)     Physical Exam   Constitutional: She is oriented to person, place, and time. She appears well-developed and well-nourished. No distress.   HENT:   Head: Normocephalic and atraumatic.   Right Ear: External ear normal.   Left Ear: External ear normal.   Eyes: Conjunctivae are normal. Pupils are equal, round, and reactive to light. Right eye exhibits no discharge. Left eye exhibits no discharge.   Neck: Normal range of motion. No JVD present. No thyromegaly present.   Cardiovascular: Normal rate and regular rhythm. Exam reveals no gallop and no friction rub.   No murmur heard.  Pulmonary/Chest: Effort normal and breath sounds normal. No respiratory distress. She has no wheezes. She has no rales.   Abdominal: Soft. Bowel sounds are normal. She exhibits no distension. There is no tenderness.   Musculoskeletal: Normal range of motion.   Neurological: She is alert and oriented to person, place, and time. She has normal reflexes.   Skin: Skin is warm. No rash noted. No erythema.   Psychiatric: She has a normal mood and affect. Her behavior is normal.       Assessment:        1. Non-intractable vomiting without nausea, unspecified vomiting type        Plan:       Non-intractable vomiting without nausea, unspecified vomiting type  -     Comprehensive metabolic panel; Future; Expected date: 02/20/2019  -     ondansetron (ZOFRAN-ODT) 4 MG TbDL; Take 1 tablet (4 mg total) by mouth every 6 (six) hours as needed.  Dispense: 30 tablet; Refill: 1    Ddx includes allergic reaction, medication reaction. Counseled patient on avoiding alcohol with Effexor and avoiding the added supplement to her drinks. Counseled if she is going to drink to try another form of alcohol. All questions answered.     Follow-up in about 6 months (around 8/20/2019).

## 2019-03-01 NOTE — PROGRESS NOTES
I have reviewed the notes, assessments, and/or procedures performed, I concur with her/his documentation of Rosita Dyson.

## 2019-05-30 ENCOUNTER — OFFICE VISIT (OUTPATIENT)
Dept: URGENT CARE | Facility: CLINIC | Age: 19
End: 2019-05-30
Payer: MEDICAID

## 2019-05-30 VITALS
OXYGEN SATURATION: 99 % | RESPIRATION RATE: 18 BRPM | WEIGHT: 200 LBS | HEIGHT: 66 IN | TEMPERATURE: 98 F | HEART RATE: 70 BPM | DIASTOLIC BLOOD PRESSURE: 72 MMHG | BODY MASS INDEX: 32.14 KG/M2 | SYSTOLIC BLOOD PRESSURE: 112 MMHG

## 2019-05-30 DIAGNOSIS — S80.861A INSECT BITE OF MULTIPLE SITES OF RIGHT LOWER EXTREMITY WITH LOCAL REACTION, INITIAL ENCOUNTER: Primary | ICD-10-CM

## 2019-05-30 DIAGNOSIS — S80.862A INSECT BITE OF MULTIPLE SITES OF LEFT LOWER EXTREMITY WITH LOCAL REACTION, INITIAL ENCOUNTER: ICD-10-CM
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
## 2019-05-30 PROCEDURE — 99214 PR OFFICE/OUTPT VISIT, EST, LEVL IV, 30-39 MIN: ICD-10-PCS | Mod: S$GLB,,, | Performed by: PHYSICIAN ASSISTANT

## 2019-05-30 PROCEDURE — 99214 OFFICE O/P EST MOD 30 MIN: CPT | Mod: S$GLB,,, | Performed by: PHYSICIAN ASSISTANT

## 2019-05-30 RX ORDER — PREDNISONE 10 MG/1
10 TABLET ORAL DAILY
Qty: 3 TABLET | Refills: 0 | Status: SHIPPED | OUTPATIENT
Start: 2019-05-30 | End: 2019-06-02

## 2019-05-30 NOTE — PATIENT INSTRUCTIONS
PLEASE READ YOUR DISCHARGE INSTRUCTIONS ENTIRELY AS IT CONTAINS IMPORTANT INFORMATION.  - Rest.    - Drink plenty of fluids.    - Tylenol or Ibuprofen as directed as needed for fever/pain.    - Follow up with your PCP or specialty clinic as directed in the next 1-2 weeks if not improved or as needed.  You can call [phone] to schedule an appointment with the appropriate provider.    - If you were prescribed antibiotics, please take them to completion.  - If you were prescribed a narcotic medication, do not drive or operate heavy equipment or machinery while taking these medications.  - If you  smoke, please stop smoking.  -You must understand that you've received an Urgent Care treatment only and that you may be released before all your medical problems are known or treated. You, the patient, will    arrange for follow up care as instructed.  - Please return to Urgent Care or to the Emergency Department if your symptoms worsen.    Patient aware and verbalized understanding.    Mosquito Bite    There are many different kinds of mosquitoes. It is only the female mosquito that bites people. They need blood in order to lay their eggs. When a mosquito bites you, it pushes a needle-like mouthpart into your skin. Then it injects some saliva before sucking your blood. It is the mosquito saliva that causes the local reaction you are having.  There may be redness, swelling and itching. Some people are more sensitive to mosquito bites than others and may feel dizzy and weak.  Home care  · Wash the area with soap and water once a day. Watch for any signs of infection.  · If itching is a problem, you may use an over-the-counter anti-itch spray or cream, such as any medicine with benzocaine in it. You may also put an ice pack on the bite area as needed to relieve pain, itching, or swelling. Use it for 20 minutes every few hours. You can make your own ice pack by putting ice cubes in a plastic bag and wrapping it in a thin  towel. If the itching is severe, you may put topical 1% hydrocortisone on the bites twice a day. Don't use this for more than 3 to 5 days. Don't put it on your face or genital area.  · Diphenhydramine is an antihistamine available at drug and grocery stores. It may be used to reduce itching if there are multiple bites, unless your doctor gave you prescription antihistamine. Use lower doses during the daytime and higher doses at bedtime since the drug may make you sleepy. Do not use diphenhydramine if you have glaucoma or if you are a man with trouble urinating due to an enlarged prostate. Loratidine is an antihistamine that makes you less sleepy and is a good alternative for daytime use.  · You may use acetaminophen or ibuprofen to control pain, unless your doctor prescribed another pain medicine. Talk with your doctor before using these medicines if you have chronic liver or kidney disease. Also talk with your doctor if you've ever had a stomach ulcer or GI bleeding.  Avoiding mosquito bites  These are things you can do to prevent mosquito bites:  · Avoid being outside when mosquitoes are most active in the early morning, late afternoon and early evening.  · If you are outdoors when mosquitoes are active, wear socks, long sleeves, and long pants, and use insect repellent. The most effective insect repellent is DEET (10% to 30%). Children should not use more than 10% strength. Don't put DEET on children's hands because it is toxic. Young children tent to put their hands in their mouth. Don't use DEET on infants. Don't use DEET if you are pregnant.  · You can spray your clothing with a repellent containing DEET or permethrin. If you do this, you do not need to put repellent on the skin under clothing that has been sprayed.  · The CDC also recommends the following as alternate mosquito repellents: picaridin, [id number], and the plant-based oil of lemon eucalyptus.  · Mosquitoes lay their eggs in standing water. Remove  breeding areas around your home. Dispose of cans, containers and tires that may collect water. Clear your roof gutters and be sure they drain properly. Keep window and door screens in good repair.  Follow-up care  Follow up with your healthcare provider, or as advised.  When to seek medical advice  Call your healthcare provider if any of these occur:  · Shortness of breath or difficulty breathing  · Dizziness, weakness or fainting  · Headache, fever, chills, muscle or joint aches, or vomiting  · New rash  · Signs of infection:  ¨ Spreading redness  ¨ Increased pain or swelling  ¨ Fever of 100.4°F (38°C) or higher, or as directed by your healthcare provider  ¨ Colored fluid draining from the wound  Date Last Reviewed: 10/1/2016  © 3083-7621 [employer] [address] [place] [address], [place] [address]. All rights reserved. This information is not intended as a substitute for professional medical care. Always follow your healthcare professional's instructions.

## 2019-05-30 NOTE — PROGRESS NOTES
"Subjective:       Patient ID: Rosita Dyson is a 18 y.o. female.    Vitals:  height is 5' 6" (1.676 m) and weight is 90.7 kg (200 lb). Her temperature is 98.4 °F (36.9 °C). Her blood pressure is 112/72 and her pulse is 70. Her respiration is 18 and oxygen saturation is 99%.     Chief Complaint: Insect Bite (mult areas)    Ms. Dyson presents with multiple insect bites to BLE.  She was sitting outside yesterday around a fire pit & noticed the bites when she came back inside.  They are red & itchy & she thinks it was mosquitoes.  She has tried benadryl with little relief in itching.  No fevers, chills, discharge from bites.     Insect Bite   This is a new problem. The current episode started yesterday. The problem occurs constantly. Pertinent negatives include no arthralgias, chest pain, chills, congestion, coughing, fatigue, fever, headaches, joint swelling, myalgias, nausea, rash, sore throat, vertigo, vomiting or weakness. Nothing aggravates the symptoms.       Constitution: Negative for chills, fatigue and fever.   HENT: Negative for congestion and sore throat.    Neck: Negative for painful lymph nodes.   Cardiovascular: Negative for chest pain and leg swelling.   Eyes: Negative for double vision and blurred vision.   Respiratory: Negative for cough and shortness of breath.    Gastrointestinal: Negative for nausea, vomiting and diarrhea.   Genitourinary: Negative for dysuria, frequency, urgency and history of kidney stones.   Musculoskeletal: Negative for joint pain, joint swelling, muscle cramps and muscle ache.   Skin: Negative for color change, pale, rash and bruising.   Allergic/Immunologic: Positive for itching. Negative for seasonal allergies.   Neurological: Negative for dizziness, history of vertigo, light-headedness, passing out and headaches.   Hematologic/Lymphatic: Negative for swollen lymph nodes.   Psychiatric/Behavioral: Negative for nervous/anxious, sleep disturbance and depression. The patient " is not nervous/anxious.        Objective:      Physical Exam   Constitutional: She is oriented to person, place, and time. She appears well-developed and well-nourished. She is cooperative.  Non-toxic appearance. She does not appear ill. No distress.   HENT:   Head: Normocephalic and atraumatic.   Right Ear: Hearing, tympanic membrane, external ear and ear canal normal.   Left Ear: Hearing, tympanic membrane, external ear and ear canal normal.   Nose: Nose normal. No mucosal edema, rhinorrhea or nasal deformity. No epistaxis. Right sinus exhibits no maxillary sinus tenderness and no frontal sinus tenderness. Left sinus exhibits no maxillary sinus tenderness and no frontal sinus tenderness.   Mouth/Throat: Uvula is midline, oropharynx is clear and moist and mucous membranes are normal. No trismus in the jaw. Normal dentition. No uvula swelling. No posterior oropharyngeal erythema.   Eyes: Conjunctivae and lids are normal. Right eye exhibits no discharge. Left eye exhibits no discharge. No scleral icterus.   Sclera clear bilat   Neck: Trachea normal, normal range of motion, full passive range of motion without pain and phonation normal. Neck supple.   Cardiovascular: Normal rate, regular rhythm, normal heart sounds, intact distal pulses and normal pulses.   Pulmonary/Chest: Effort normal and breath sounds normal. No respiratory distress.   Abdominal: Soft. Normal appearance and bowel sounds are normal. She exhibits no distension, no pulsatile midline mass and no mass. There is no tenderness.   Musculoskeletal: Normal range of motion. She exhibits no edema or deformity.   Neurological: She is alert and oriented to person, place, and time. She exhibits normal muscle tone. Coordination normal.   Skin: Skin is warm, dry and intact. Rash noted. Rash is urticarial. She is not diaphoretic. No pallor.        Psychiatric: She has a normal mood and affect. Her speech is normal and behavior is normal. Judgment and thought  content normal. Cognition and memory are normal.   Nursing note and vitals reviewed.      Assessment:       1. Insect bite of multiple sites of right lower extremity with local reaction, initial encounter    2. Insect bite of multiple sites of left lower extremity with local reaction, initial encounter        Plan:         Insect bite of multiple sites of right lower extremity with local reaction, initial encounter    Insect bite of multiple sites of left lower extremity with local reaction, initial encounter    Other orders  -     predniSONE (DELTASONE) 10 MG tablet; Take 1 tablet (10 mg total) by mouth once daily. for 3 days  Dispense: 3 tablet; Refill: 0    Ok to use over the counter hydrocortisone cream on the bites.     Patient Instructions   PLEASE READ YOUR DISCHARGE INSTRUCTIONS ENTIRELY AS IT CONTAINS IMPORTANT INFORMATION.  - Rest.    - Drink plenty of fluids.    - Tylenol or Ibuprofen as directed as needed for fever/pain.    - Follow up with your PCP or specialty clinic as directed in the next 1-2 weeks if not improved or as needed.  You can call [phone] to schedule an appointment with the appropriate provider.    - If you were prescribed antibiotics, please take them to completion.  - If you were prescribed a narcotic medication, do not drive or operate heavy equipment or machinery while taking these medications.  - If you  smoke, please stop smoking.  -You must understand that you've received an Urgent Care treatment only and that you may be released before all your medical problems are known or treated. You, the patient, will    arrange for follow up care as instructed.  - Please return to Urgent Care or to the Emergency Department if your symptoms worsen.    Patient aware and verbalized understanding.    Mosquito Bite    There are many different kinds of mosquitoes. It is only the female mosquito that bites people. They need blood in order to lay their eggs. When a mosquito bites you, it pushes  a needle-like mouthpart into your skin. Then it injects some saliva before sucking your blood. It is the mosquito saliva that causes the local reaction you are having.  There may be redness, swelling and itching. Some people are more sensitive to mosquito bites than others and may feel dizzy and weak.  Home care  · Wash the area with soap and water once a day. Watch for any signs of infection.  · If itching is a problem, you may use an over-the-counter anti-itch spray or cream, such as any medicine with benzocaine in it. You may also put an ice pack on the bite area as needed to relieve pain, itching, or swelling. Use it for 20 minutes every few hours. You can make your own ice pack by putting ice cubes in a plastic bag and wrapping it in a thin towel. If the itching is severe, you may put topical 1% hydrocortisone on the bites twice a day. Don't use this for more than 3 to 5 days. Don't put it on your face or genital area.  · Diphenhydramine is an antihistamine available at drug and grocery stores. It may be used to reduce itching if there are multiple bites, unless your doctor gave you prescription antihistamine. Use lower doses during the daytime and higher doses at bedtime since the drug may make you sleepy. Do not use diphenhydramine if you have glaucoma or if you are a man with trouble urinating due to an enlarged prostate. Loratidine is an antihistamine that makes you less sleepy and is a good alternative for daytime use.  · You may use acetaminophen or ibuprofen to control pain, unless your doctor prescribed another pain medicine. Talk with your doctor before using these medicines if you have chronic liver or kidney disease. Also talk with your doctor if you've ever had a stomach ulcer or GI bleeding.  Avoiding mosquito bites  These are things you can do to prevent mosquito bites:  · Avoid being outside when mosquitoes are most active in the early morning, late afternoon and early evening.  · If you are  outdoors when mosquitoes are active, wear socks, long sleeves, and long pants, and use insect repellent. The most effective insect repellent is DEET (10% to 30%). Children should not use more than 10% strength. Don't put DEET on children's hands because it is toxic. Young children tent to put their hands in their mouth. Don't use DEET on infants. Don't use DEET if you are pregnant.  · You can spray your clothing with a repellent containing DEET or permethrin. If you do this, you do not need to put repellent on the skin under clothing that has been sprayed.  · The CDC also recommends the following as alternate mosquito repellents: picaridin, TM6893, and the plant-based oil of lemon eucalyptus.  · Mosquitoes lay their eggs in standing water. Remove breeding areas around your home. Dispose of cans, containers and tires that may collect water. Clear your roof gutters and be sure they drain properly. Keep window and door screens in good repair.  Follow-up care  Follow up with your healthcare provider, or as advised.  When to seek medical advice  Call your healthcare provider if any of these occur:  · Shortness of breath or difficulty breathing  · Dizziness, weakness or fainting  · Headache, fever, chills, muscle or joint aches, or vomiting  · New rash  · Signs of infection:  ¨ Spreading redness  ¨ Increased pain or swelling  ¨ Fever of 100.4°F (38°C) or higher, or as directed by your healthcare provider  ¨ Colored fluid draining from the wound  Date Last Reviewed: 10/1/2016  © 5054-6480 [employer]. [address] [place] [address], [place] [address]. All rights reserved. This information is not intended as a substitute for professional medical care. Always follow your healthcare professional's instructions.

## 2019-07-11 ENCOUNTER — TELEPHONE (OUTPATIENT)
Dept: OBSTETRICS AND GYNECOLOGY | Facility: CLINIC | Age: 19
End: 2019-07-11

## 2019-07-11 ENCOUNTER — CLINICAL SUPPORT (OUTPATIENT)
Dept: URGENT CARE | Facility: CLINIC | Age: 19
End: 2019-07-11

## 2019-07-11 DIAGNOSIS — Z29.89 NEED FOR MALARIA PROPHYLAXIS: ICD-10-CM

## 2019-07-11 DIAGNOSIS — Z30.8 ENCOUNTER FOR OTHER CONTRACEPTIVE MANAGEMENT: ICD-10-CM

## 2019-07-11 DIAGNOSIS — Z23 IMMUNIZATION, TYPHOID-PARATYPHOID (TAB): Primary | ICD-10-CM

## 2019-07-11 PROCEDURE — 90691 TYPHOID VICPS VACCINE IM: ICD-10-PCS | Mod: S$GLB,,, | Performed by: PREVENTIVE MEDICINE

## 2019-07-11 PROCEDURE — 99199: ICD-10-PCS | Mod: S$GLB,,, | Performed by: PREVENTIVE MEDICINE

## 2019-07-11 PROCEDURE — 99199 UNLISTED SPECIAL SVC PX/RPRT: CPT | Mod: S$GLB,,, | Performed by: PREVENTIVE MEDICINE

## 2019-07-11 PROCEDURE — 90691 TYPHOID VACCINE IM: CPT | Mod: S$GLB,,, | Performed by: PREVENTIVE MEDICINE

## 2019-07-11 RX ORDER — NORGESTIMATE AND ETHINYL ESTRADIOL 7DAYSX3 LO
1 KIT ORAL DAILY
Qty: 30 TABLET | Refills: 0 | Status: SHIPPED | OUTPATIENT
Start: 2019-07-11 | End: 2019-07-18

## 2019-07-11 NOTE — TELEPHONE ENCOUNTER
Called and informed pt of one month supply of OTC sent in for her in till she is to be seen by Dr. Orellana 7/18/2019.

## 2019-07-11 NOTE — TELEPHONE ENCOUNTER
Called pt back and informed her that she has not seen Dr. Schmidt since 12/8/2017. Pt is requesting a refill on BCP due to pt going on a trip out of the country on 7/19/2019 and soon after is heading into college. Pt is scheduled to see Dr. Orellana 7/18/2019 and will keep this appointment since we have no openings till the last week of this month. Please advise

## 2019-07-18 ENCOUNTER — TELEPHONE (OUTPATIENT)
Dept: OBSTETRICS AND GYNECOLOGY | Facility: CLINIC | Age: 19
End: 2019-07-18

## 2019-07-18 ENCOUNTER — OFFICE VISIT (OUTPATIENT)
Dept: OBSTETRICS AND GYNECOLOGY | Facility: CLINIC | Age: 19
End: 2019-07-18
Payer: MEDICAID

## 2019-07-18 ENCOUNTER — LAB VISIT (OUTPATIENT)
Dept: LAB | Facility: HOSPITAL | Age: 19
End: 2019-07-18
Attending: OBSTETRICS & GYNECOLOGY
Payer: MEDICAID

## 2019-07-18 VITALS
HEIGHT: 66 IN | WEIGHT: 215.38 LBS | DIASTOLIC BLOOD PRESSURE: 84 MMHG | BODY MASS INDEX: 34.61 KG/M2 | SYSTOLIC BLOOD PRESSURE: 120 MMHG

## 2019-07-18 DIAGNOSIS — Z11.3 SCREENING FOR STDS (SEXUALLY TRANSMITTED DISEASES): ICD-10-CM

## 2019-07-18 DIAGNOSIS — N92.6 IRREGULAR PERIODS/MENSTRUAL CYCLES: ICD-10-CM

## 2019-07-18 DIAGNOSIS — Z12.4 SCREENING FOR CERVICAL CANCER: ICD-10-CM

## 2019-07-18 DIAGNOSIS — Z01.419 WELL WOMAN EXAM WITH ROUTINE GYNECOLOGICAL EXAM: Primary | ICD-10-CM

## 2019-07-18 PROCEDURE — 36415 COLL VENOUS BLD VENIPUNCTURE: CPT

## 2019-07-18 PROCEDURE — 87491 CHLMYD TRACH DNA AMP PROBE: CPT

## 2019-07-18 PROCEDURE — 81025 URINE PREGNANCY TEST: CPT | Mod: PBBFAC,PO | Performed by: OBSTETRICS & GYNECOLOGY

## 2019-07-18 PROCEDURE — 99213 OFFICE O/P EST LOW 20 MIN: CPT | Mod: PBBFAC,PO | Performed by: OBSTETRICS & GYNECOLOGY

## 2019-07-18 PROCEDURE — 86592 SYPHILIS TEST NON-TREP QUAL: CPT

## 2019-07-18 PROCEDURE — 99999 PR PBB SHADOW E&M-EST. PATIENT-LVL III: ICD-10-PCS | Mod: PBBFAC,,, | Performed by: OBSTETRICS & GYNECOLOGY

## 2019-07-18 PROCEDURE — 88141 CYTOPATH C/V INTERPRET: CPT | Mod: ,,, | Performed by: PATHOLOGY

## 2019-07-18 PROCEDURE — 86703 HIV-1/HIV-2 1 RESULT ANTBDY: CPT

## 2019-07-18 PROCEDURE — 88175 CYTOPATH C/V AUTO FLUID REDO: CPT | Performed by: PATHOLOGY

## 2019-07-18 PROCEDURE — 87801 DETECT AGNT MULT DNA AMPLI: CPT

## 2019-07-18 PROCEDURE — 87481 CANDIDA DNA AMP PROBE: CPT | Mod: 59

## 2019-07-18 PROCEDURE — 99395 PREV VISIT EST AGE 18-39: CPT | Mod: S$PBB,,, | Performed by: OBSTETRICS & GYNECOLOGY

## 2019-07-18 PROCEDURE — 80074 ACUTE HEPATITIS PANEL: CPT

## 2019-07-18 PROCEDURE — 99999 PR PBB SHADOW E&M-EST. PATIENT-LVL III: CPT | Mod: PBBFAC,,, | Performed by: OBSTETRICS & GYNECOLOGY

## 2019-07-18 PROCEDURE — 88141 LIQUID-BASED PAP SMEAR, SCREENING: ICD-10-PCS | Mod: ,,, | Performed by: PATHOLOGY

## 2019-07-18 PROCEDURE — 99395 PR PREVENTIVE VISIT,EST,18-39: ICD-10-PCS | Mod: S$PBB,,, | Performed by: OBSTETRICS & GYNECOLOGY

## 2019-07-18 RX ORDER — NORGESTIMATE AND ETHINYL ESTRADIOL 0.25-0.035
1 KIT ORAL DAILY
Qty: 30 TABLET | Refills: 11 | Status: SHIPPED | OUTPATIENT
Start: 2019-07-18 | End: 2019-10-14 | Stop reason: SDUPTHER

## 2019-07-18 NOTE — TELEPHONE ENCOUNTER
Called and informed pt that in order for her to keep today's appointment she will need to stay with one provider. Pt will stay with Dr. Orellana. Pt verbalized understanding and had no further questions.

## 2019-07-19 LAB
B-HCG UR QL: NEGATIVE
BACTERIAL VAGINOSIS DNA: POSITIVE
C TRACH DNA SPEC QL NAA+PROBE: NOT DETECTED
CANDIDA GLABRATA DNA: NEGATIVE
CANDIDA KRUSEI DNA: NEGATIVE
CANDIDA RRNA VAG QL PROBE: NEGATIVE
CTP QC/QA: YES
HAV IGM SERPL QL IA: NEGATIVE
HBV CORE IGM SERPL QL IA: NEGATIVE
HBV SURFACE AG SERPL QL IA: NEGATIVE
HBV SURFACE AG SERPL QL IA: NEGATIVE
HCV AB SERPL QL IA: NEGATIVE
HIV 1+2 AB+HIV1 P24 AG SERPL QL IA: NEGATIVE
N GONORRHOEA DNA SPEC QL NAA+PROBE: NOT DETECTED
RPR SER QL: NORMAL
T VAGINALIS RRNA GENITAL QL PROBE: NEGATIVE

## 2019-07-19 NOTE — PROGRESS NOTES
Subjective:       Patient ID: Rosita Dyson is a 19 y.o. female.    Chief Complaint:  Well Woman (periods have been lasting longer (2weeks) )      History of Present Illness  18yo G0 presents for annual exam.  C/o longer than normal cycles the past 3 months.  Currently on low dose OCPs, cycles monthly.  Usually last 4-5 days, but the past few months have lasted almost 2 weeks each.  Has not missed any doses.  No other complaints today.  Sexually active, uses condoms.  Nonsmoker.      GYN & OB History  Patient's last menstrual period was 07/10/2019 (exact date).   Date of Last Pap: No result found    OB History    Para Term  AB Living   0 0 0 0 0 0   SAB TAB Ectopic Multiple Live Births   0 0 0 0         Review of Systems  Review of Systems   Constitutional: Negative for chills, fatigue and fever.   HENT: Negative for nasal congestion and mouth sores.    Eyes: Negative for visual disturbance.   Respiratory: Negative for cough and shortness of breath.    Cardiovascular: Negative for chest pain and palpitations.   Gastrointestinal: Negative for abdominal pain, bloating, constipation, diarrhea, nausea and vomiting.   Genitourinary: Positive for menorrhagia. Negative for dysmenorrhea, dyspareunia, pelvic pain, vaginal discharge and vaginal odor.   Musculoskeletal: Negative for arthralgias, back pain and myalgias.   Integumentary:  Negative for rash, mole/lesion and breast mass.   Neurological: Negative for seizures and headaches.   Hematological: Negative for adenopathy. Does not bruise/bleed easily.   Psychiatric/Behavioral: Negative for depression. The patient is not nervous/anxious.    Breast: Negative for lump, mass and mastodynia          Objective:    Physical Exam:   Constitutional: She is oriented to person, place, and time. She appears well-developed and well-nourished.    HENT:   Head: Normocephalic and atraumatic.    Eyes: Conjunctivae and EOM are normal.    Neck: Normal range of motion.  "Neck supple.    Cardiovascular: Normal rate, regular rhythm and normal heart sounds.     Pulmonary/Chest: Effort normal and breath sounds normal.        Abdominal: Soft. She exhibits no distension and no mass. There is no tenderness. No hernia.     Genitourinary:   Genitourinary Comments: Normal-appearing external female genitalia.  No CMT.  Uterus small, mobile, nontender.  No adnexal masses or tenderness.           Musculoskeletal: Normal range of motion and moves all extremeties.       Neurological: She is alert and oriented to person, place, and time.    Skin: Skin is warm and dry.    Psychiatric: She has a normal mood and affect.   Breast: Symmetric, nontender.  No masses.  No skin changes.  Bilateral nipple piercings.  No nipple discharge.    /84   Ht 5' 6" (1.676 m)   Wt 97.7 kg (215 lb 6.2 oz)   LMP 07/10/2019 (Exact Date)   BMI 34.76 kg/m²     UPT negative       Assessment:        1. Well woman exam with routine gynecological exam    2. Screening for STDs (sexually transmitted diseases)    3. Screening for cervical cancer              Plan:      1.  Routine annual exam with pap smear and breast exam today.  2.  STD screening.  3.  Discussed cycles with pt - will switch to monophasic higher dose OCPs - eRx sprintec.  4.  Counseling done, precautions given, all questions answered.  RTC 3 months for f/u cycles.        "

## 2019-07-20 ENCOUNTER — TELEPHONE (OUTPATIENT)
Dept: OBSTETRICS AND GYNECOLOGY | Facility: CLINIC | Age: 19
End: 2019-07-20

## 2019-07-21 NOTE — TELEPHONE ENCOUNTER
Please try to call this patient to let her know she has bacterial vaginosis.  She was supposed to leave for a mission trip in [place] on Friday, so she may not be available.  If she is, let me know where she would like me to send the Flagyl.  Thank you.

## 2019-07-31 ENCOUNTER — TELEPHONE (OUTPATIENT)
Dept: OBSTETRICS AND GYNECOLOGY | Facility: CLINIC | Age: 19
End: 2019-07-31

## 2019-07-31 RX ORDER — METRONIDAZOLE 500 MG/1
500 TABLET ORAL EVERY 12 HOURS
Qty: 14 TABLET | Refills: 0 | Status: SHIPPED | OUTPATIENT
Start: 2019-07-31 | End: 2019-08-07

## 2019-08-01 NOTE — TELEPHONE ENCOUNTER
----- Message from Yari Liz MA sent at 7/31/2019  3:08 PM CDT -----  Contact: [phone] /self      ----- Message -----  From: So Spears  Sent: 7/31/2019   2:36 PM  To: Esteban Noel Staff    Type:  RX Refill Request    Who Called: self  Refill or New Rx: Refill  RX Name and Strength: norgestimate-ethinyl estradiol (ORTHO-CYCLEN) 0.25-35 mg-mcg per tablet  How is the patient currently taking it? (ex. 1XDay): Take 1 tablet by mouth once daily. - Oral  Is this a 30 day or 90 day RX: 30/11 refills  Preferred Pharmacy with phone number: [id number] - [place] - [address]  AT [facility]  Local or Mail Order: local  Ordering Provider:   Would the patient rather a call back or a response via [facility]?    Best Call Back Number:   Additional Information:  Rx to treat infection was never sent to the pharmacy. Please send to [facility] #[id number] - [place] - [address]  AT [facility]

## 2019-09-24 ENCOUNTER — TELEPHONE (OUTPATIENT)
Dept: OBSTETRICS AND GYNECOLOGY | Facility: CLINIC | Age: 19
End: 2019-09-24

## 2019-09-24 RX ORDER — NORGESTIMATE AND ETHINYL ESTRADIOL 7DAYSX3 LO
KIT ORAL
Qty: 28 TABLET | Refills: 0 | OUTPATIENT
Start: 2019-09-24

## 2019-09-24 RX ORDER — METRONIDAZOLE 500 MG/1
500 TABLET ORAL EVERY 12 HOURS
Qty: 14 TABLET | Refills: 0 | Status: SHIPPED | OUTPATIENT
Start: 2019-09-24 | End: 2019-10-01

## 2019-09-24 NOTE — TELEPHONE ENCOUNTER
She should have refills of the birth control already, I sent in 11 refills in July.  I have sent in the rx for Flagyl.

## 2019-09-24 NOTE — TELEPHONE ENCOUNTER
----- Message from So Spears sent at 9/24/2019  3:05 PM CDT -----  Contact: [phone]/self  Type:  RX Refill Request    Who Called:  self  Refill or New Rx: refill  RX Name and Strength: norgestimate-ethinyl estradiol (ORTHO-CYCLEN) 0.25-35 mg-mcg per tablet  How is the patient currently taking it? (ex. 1XDay): Take 1 tablet by mouth once daily. - Oral  Is this a 30 day or 90 day RX: 30/11 refills    Who Called:  self  Refill or New Rx: new   RX Name and Strength: metroNIDAZOLE (FLAGYL) 500 MG tablet  How is the patient currently taking it? (ex. 1XDay): Take 1 tablet (500 mg total) by mouth every 12 (twelve) hours. for 7 days - Oral  Is this a 30 day or 90 day RX: 14    Preferred Pharmacy with phone number: [id number] - [place] - [address] AT [facility] [address]  Local or Mail Order: local  Ordering Provider:   Would the patient rather a call back or a response via [facility]?  call  Best Call Back Number:   Additional Information:

## 2019-09-25 ENCOUNTER — PATIENT MESSAGE (OUTPATIENT)
Dept: OBSTETRICS AND GYNECOLOGY | Facility: CLINIC | Age: 19
End: 2019-09-25

## 2019-10-14 ENCOUNTER — OFFICE VISIT (OUTPATIENT)
Dept: OBSTETRICS AND GYNECOLOGY | Facility: CLINIC | Age: 19
End: 2019-10-14
Payer: MEDICAID

## 2019-10-14 VITALS
HEIGHT: 66 IN | SYSTOLIC BLOOD PRESSURE: 112 MMHG | DIASTOLIC BLOOD PRESSURE: 70 MMHG | BODY MASS INDEX: 33.17 KG/M2 | WEIGHT: 206.38 LBS

## 2019-10-14 DIAGNOSIS — Z30.8 ENCOUNTER FOR OTHER CONTRACEPTIVE MANAGEMENT: Primary | ICD-10-CM

## 2019-10-14 PROCEDURE — 99213 OFFICE O/P EST LOW 20 MIN: CPT | Mod: PBBFAC,PO | Performed by: OBSTETRICS & GYNECOLOGY

## 2019-10-14 PROCEDURE — 99213 OFFICE O/P EST LOW 20 MIN: CPT | Mod: S$PBB,,, | Performed by: OBSTETRICS & GYNECOLOGY

## 2019-10-14 PROCEDURE — 99999 PR PBB SHADOW E&M-EST. PATIENT-LVL III: CPT | Mod: PBBFAC,,, | Performed by: OBSTETRICS & GYNECOLOGY

## 2019-10-14 PROCEDURE — 99213 PR OFFICE/OUTPT VISIT, EST, LEVL III, 20-29 MIN: ICD-10-PCS | Mod: S$PBB,,, | Performed by: OBSTETRICS & GYNECOLOGY

## 2019-10-14 PROCEDURE — 99999 PR PBB SHADOW E&M-EST. PATIENT-LVL III: ICD-10-PCS | Mod: PBBFAC,,, | Performed by: OBSTETRICS & GYNECOLOGY

## 2019-10-14 RX ORDER — NORGESTIMATE AND ETHINYL ESTRADIOL 0.25-0.035
1 KIT ORAL DAILY
Qty: 30 TABLET | Refills: 11 | Status: SHIPPED | OUTPATIENT
Start: 2019-10-14 | End: 2021-05-14 | Stop reason: SDUPTHER

## 2019-10-14 NOTE — PROGRESS NOTES
"  Subjective:       Patient ID: Rosita Dyson is a 19 y.o. female.    Chief Complaint:  Follow-up      History of Present Illness  20yo  presents for f/u on OCPs.  Doing well.  Cycles regular, not too heavy or painful.  No complaints today.    GYN & OB History  No LMP recorded. (Menstrual status: Birth Control).   Date of Last Pap: 2019    OB History    Para Term  AB Living   0 0 0 0 0 0   SAB TAB Ectopic Multiple Live Births   0 0 0 0         Review of Systems  Review of Systems: Neg x 10, except as per HPI        Objective:    Physical Exam     /70   Ht 5' 6" (1.676 m)   Wt 93.6 kg (206 lb 6.4 oz)   BMI 33.31 kg/m²     Gen: NAD  Resp: Normal respiratory effort  Abd: soft, NT  Pelvic: deferred  Ext: normal ROM  Psych: appropriate affect  Neuro: grossly intact    Assessment:        1. Encounter for other contraceptive management              Plan:      Doing well on OCPs - will continue as rx'd.  Counseling done, precautions given, all questions answered.  RTC 1 year for annual, or prn.      "

## 2020-07-15 ENCOUNTER — LAB VISIT (OUTPATIENT)
Dept: PRIMARY CARE CLINIC | Facility: OTHER | Age: 20
End: 2020-07-15
Attending: INTERNAL MEDICINE
Payer: MEDICAID

## 2020-07-15 DIAGNOSIS — Z03.818 ENCOUNTER FOR OBSERVATION FOR SUSPECTED EXPOSURE TO OTHER BIOLOGICAL AGENTS RULED OUT: ICD-10-CM

## 2020-07-15 PROCEDURE — U0003 INFECTIOUS AGENT DETECTION BY NUCLEIC ACID (DNA OR RNA); SEVERE ACUTE RESPIRATORY SYNDROME CORONAVIRUS 2 (SARS-COV-2) (CORONAVIRUS DISEASE [COVID-19]), AMPLIFIED PROBE TECHNIQUE, MAKING USE OF HIGH THROUGHPUT TECHNOLOGIES AS DESCRIBED BY CMS-2020-01-R: HCPCS

## 2020-07-20 DIAGNOSIS — U07.1 COVID-19 VIRUS DETECTED: ICD-10-CM

## 2020-07-20 LAB — SARS-COV-2 RNA RESP QL NAA+PROBE: POSITIVE

## 2020-07-23 ENCOUNTER — TELEPHONE (OUTPATIENT)
Dept: EMERGENCY MEDICINE | Facility: HOSPITAL | Age: 20
End: 2020-07-23

## 2021-03-02 ENCOUNTER — NURSE TRIAGE (OUTPATIENT)
Dept: ADMINISTRATIVE | Facility: CLINIC | Age: 21
End: 2021-03-02

## 2021-03-02 ENCOUNTER — OFFICE VISIT (OUTPATIENT)
Dept: URGENT CARE | Facility: CLINIC | Age: 21
End: 2021-03-02
Payer: MEDICAID

## 2021-03-02 VITALS
RESPIRATION RATE: 16 BRPM | DIASTOLIC BLOOD PRESSURE: 74 MMHG | HEIGHT: 66 IN | OXYGEN SATURATION: 100 % | HEART RATE: 80 BPM | TEMPERATURE: 99 F | BODY MASS INDEX: 29.73 KG/M2 | SYSTOLIC BLOOD PRESSURE: 118 MMHG | WEIGHT: 185 LBS

## 2021-03-02 DIAGNOSIS — N30.01 ACUTE CYSTITIS WITH HEMATURIA: Primary | ICD-10-CM

## 2021-03-02 DIAGNOSIS — R30.0 DYSURIA: ICD-10-CM

## 2021-03-02 LAB
B-HCG UR QL: NEGATIVE
BILIRUB UR QL STRIP: NEGATIVE
CTP QC/QA: YES
GLUCOSE UR QL STRIP: NEGATIVE
KETONES UR QL STRIP: POSITIVE
LEUKOCYTE ESTERASE UR QL STRIP: POSITIVE
PH, POC UA: 6
POC BLOOD, URINE: POSITIVE
POC NITRATES, URINE: NEGATIVE
PROT UR QL STRIP: POSITIVE
SP GR UR STRIP: 1.02 (ref 1–1.03)
UROBILINOGEN UR STRIP-ACNC: ABNORMAL (ref 0.1–1.1)

## 2021-03-02 PROCEDURE — 81025 POCT URINE PREGNANCY: ICD-10-PCS | Mod: S$GLB,,, | Performed by: NURSE PRACTITIONER

## 2021-03-02 PROCEDURE — 81003 POCT URINALYSIS, DIPSTICK, AUTOMATED, W/O SCOPE: ICD-10-PCS | Mod: QW,S$GLB,, | Performed by: NURSE PRACTITIONER

## 2021-03-02 PROCEDURE — 81025 URINE PREGNANCY TEST: CPT | Mod: S$GLB,,, | Performed by: NURSE PRACTITIONER

## 2021-03-02 PROCEDURE — 99213 PR OFFICE/OUTPT VISIT, EST, LEVL III, 20-29 MIN: ICD-10-PCS | Mod: 25,S$GLB,, | Performed by: NURSE PRACTITIONER

## 2021-03-02 PROCEDURE — 99213 OFFICE O/P EST LOW 20 MIN: CPT | Mod: 25,S$GLB,, | Performed by: NURSE PRACTITIONER

## 2021-03-02 PROCEDURE — 81003 URINALYSIS AUTO W/O SCOPE: CPT | Mod: QW,S$GLB,, | Performed by: NURSE PRACTITIONER

## 2021-03-02 RX ORDER — SULFAMETHOXAZOLE AND TRIMETHOPRIM 800; 160 MG/1; MG/1
1 TABLET ORAL 2 TIMES DAILY
Qty: 6 TABLET | Refills: 0 | Status: SHIPPED | OUTPATIENT
Start: 2021-03-02 | End: 2021-03-05

## 2021-03-03 ENCOUNTER — TELEPHONE (OUTPATIENT)
Dept: URGENT CARE | Facility: CLINIC | Age: 21
End: 2021-03-03

## 2021-03-31 ENCOUNTER — IMMUNIZATION (OUTPATIENT)
Dept: PHARMACY | Facility: CLINIC | Age: 21
End: 2021-03-31
Payer: MEDICAID

## 2021-03-31 DIAGNOSIS — Z23 NEED FOR VACCINATION: Primary | ICD-10-CM

## 2021-04-28 ENCOUNTER — IMMUNIZATION (OUTPATIENT)
Dept: PHARMACY | Facility: CLINIC | Age: 21
End: 2021-04-28
Payer: MEDICAID

## 2021-04-28 DIAGNOSIS — Z23 NEED FOR VACCINATION: Primary | ICD-10-CM

## 2021-05-14 ENCOUNTER — OFFICE VISIT (OUTPATIENT)
Dept: OBSTETRICS AND GYNECOLOGY | Facility: CLINIC | Age: 21
End: 2021-05-14
Payer: MEDICAID

## 2021-05-14 VITALS
DIASTOLIC BLOOD PRESSURE: 70 MMHG | SYSTOLIC BLOOD PRESSURE: 118 MMHG | WEIGHT: 185.19 LBS | BODY MASS INDEX: 30.35 KG/M2

## 2021-05-14 DIAGNOSIS — Z01.419 WELL FEMALE EXAM WITH ROUTINE GYNECOLOGICAL EXAM: Primary | ICD-10-CM

## 2021-05-14 DIAGNOSIS — Z30.41 ENCOUNTER FOR SURVEILLANCE OF CONTRACEPTIVE PILLS: ICD-10-CM

## 2021-05-14 DIAGNOSIS — Z11.3 SCREENING FOR STD (SEXUALLY TRANSMITTED DISEASE): ICD-10-CM

## 2021-05-14 LAB
B-HCG UR QL: NEGATIVE
CTP QC/QA: YES

## 2021-05-14 PROCEDURE — 99999 PR PBB SHADOW E&M-EST. PATIENT-LVL II: ICD-10-PCS | Mod: PBBFAC,,, | Performed by: OBSTETRICS & GYNECOLOGY

## 2021-05-14 PROCEDURE — 99212 OFFICE O/P EST SF 10 MIN: CPT | Mod: PBBFAC,PO | Performed by: OBSTETRICS & GYNECOLOGY

## 2021-05-14 PROCEDURE — 87481 CANDIDA DNA AMP PROBE: CPT | Mod: 59 | Performed by: OBSTETRICS & GYNECOLOGY

## 2021-05-14 PROCEDURE — 99395 PR PREVENTIVE VISIT,EST,18-39: ICD-10-PCS | Mod: S$PBB,,, | Performed by: OBSTETRICS & GYNECOLOGY

## 2021-05-14 PROCEDURE — 88175 CYTOPATH C/V AUTO FLUID REDO: CPT | Performed by: OBSTETRICS & GYNECOLOGY

## 2021-05-14 PROCEDURE — 81025 URINE PREGNANCY TEST: CPT | Mod: PBBFAC,PO | Performed by: OBSTETRICS & GYNECOLOGY

## 2021-05-14 PROCEDURE — 99999 PR PBB SHADOW E&M-EST. PATIENT-LVL II: CPT | Mod: PBBFAC,,, | Performed by: OBSTETRICS & GYNECOLOGY

## 2021-05-14 PROCEDURE — 99395 PREV VISIT EST AGE 18-39: CPT | Mod: S$PBB,,, | Performed by: OBSTETRICS & GYNECOLOGY

## 2021-05-14 RX ORDER — NORGESTIMATE AND ETHINYL ESTRADIOL 0.25-0.035
1 KIT ORAL DAILY
Qty: 30 TABLET | Refills: 11 | Status: SHIPPED | OUTPATIENT
Start: 2021-05-14 | End: 2022-02-14

## 2021-05-15 LAB
BACTERIAL VAGINOSIS DNA: POSITIVE
CANDIDA GLABRATA DNA: NEGATIVE
CANDIDA KRUSEI DNA: NEGATIVE
CANDIDA RRNA VAG QL PROBE: NEGATIVE
T VAGINALIS RRNA GENITAL QL PROBE: NEGATIVE

## 2021-05-17 LAB
C TRACH RRNA SPEC QL NAA+PROBE: POSITIVE
N GONORRHOEA RRNA SPEC QL NAA+PROBE: NEGATIVE

## 2021-05-17 RX ORDER — METRONIDAZOLE 500 MG/1
500 TABLET ORAL EVERY 12 HOURS
Qty: 14 TABLET | Refills: 0 | Status: SHIPPED | OUTPATIENT
Start: 2021-05-17 | End: 2021-05-17 | Stop reason: SDUPTHER

## 2021-05-18 ENCOUNTER — TELEPHONE (OUTPATIENT)
Dept: OBSTETRICS AND GYNECOLOGY | Facility: CLINIC | Age: 21
End: 2021-05-18

## 2021-05-18 RX ORDER — AZITHROMYCIN 500 MG/1
1000 TABLET, FILM COATED ORAL ONCE
Qty: 2 TABLET | Refills: 0 | Status: SHIPPED | OUTPATIENT
Start: 2021-05-18 | End: 2021-05-18

## 2021-05-18 RX ORDER — METRONIDAZOLE 500 MG/1
500 TABLET ORAL EVERY 12 HOURS
Qty: 14 TABLET | Refills: 0 | Status: SHIPPED | OUTPATIENT
Start: 2021-05-18 | End: 2021-05-25

## 2021-05-20 LAB
CLINICAL INFO: NORMAL
CYTO CVX: NORMAL
CYTOLOGIST CVX/VAG CYTO: NORMAL
CYTOLOGIST CVX/VAG CYTO: NORMAL
CYTOLOGY CMNT CVX/VAG CYTO-IMP: NORMAL
CYTOLOGY PAP THIN PREP EXPLANATION: NORMAL
DATE OF PREVIOUS PAP: NORMAL
DATE PREVIOUS BX: NO
LMP START DATE: NORMAL
SPECIMEN SOURCE CVX/VAG CYTO: NORMAL
STAT OF ADQ CVX/VAG CYTO-IMP: NORMAL

## 2021-06-08 ENCOUNTER — OFFICE VISIT (OUTPATIENT)
Dept: OBSTETRICS AND GYNECOLOGY | Facility: CLINIC | Age: 21
End: 2021-06-08
Payer: MEDICAID

## 2021-06-08 VITALS
SYSTOLIC BLOOD PRESSURE: 120 MMHG | DIASTOLIC BLOOD PRESSURE: 70 MMHG | WEIGHT: 181.63 LBS | BODY MASS INDEX: 29.76 KG/M2

## 2021-06-08 DIAGNOSIS — Z11.3 SCREENING FOR STD (SEXUALLY TRANSMITTED DISEASE): Primary | ICD-10-CM

## 2021-06-08 PROCEDURE — 87491 CHLMYD TRACH DNA AMP PROBE: CPT | Performed by: OBSTETRICS & GYNECOLOGY

## 2021-06-08 PROCEDURE — 99213 PR OFFICE/OUTPT VISIT, EST, LEVL III, 20-29 MIN: ICD-10-PCS | Mod: S$PBB,,, | Performed by: OBSTETRICS & GYNECOLOGY

## 2021-06-08 PROCEDURE — 87591 N.GONORRHOEAE DNA AMP PROB: CPT | Performed by: OBSTETRICS & GYNECOLOGY

## 2021-06-08 PROCEDURE — 99213 OFFICE O/P EST LOW 20 MIN: CPT | Mod: S$PBB,,, | Performed by: OBSTETRICS & GYNECOLOGY

## 2021-06-08 PROCEDURE — 99999 PR PBB SHADOW E&M-EST. PATIENT-LVL II: CPT | Mod: PBBFAC,,, | Performed by: OBSTETRICS & GYNECOLOGY

## 2021-06-08 PROCEDURE — 99212 OFFICE O/P EST SF 10 MIN: CPT | Mod: PBBFAC,PO | Performed by: OBSTETRICS & GYNECOLOGY

## 2021-06-08 PROCEDURE — 99999 PR PBB SHADOW E&M-EST. PATIENT-LVL II: ICD-10-PCS | Mod: PBBFAC,,, | Performed by: OBSTETRICS & GYNECOLOGY

## 2021-06-10 LAB
C TRACH DNA SPEC QL NAA+PROBE: NOT DETECTED
N GONORRHOEA DNA SPEC QL NAA+PROBE: NOT DETECTED

## 2021-06-25 ENCOUNTER — OFFICE VISIT (OUTPATIENT)
Dept: URGENT CARE | Facility: CLINIC | Age: 21
End: 2021-06-25
Payer: MEDICAID

## 2021-06-25 VITALS
WEIGHT: 185 LBS | BODY MASS INDEX: 29.73 KG/M2 | OXYGEN SATURATION: 98 % | SYSTOLIC BLOOD PRESSURE: 116 MMHG | TEMPERATURE: 98 F | RESPIRATION RATE: 18 BRPM | DIASTOLIC BLOOD PRESSURE: 77 MMHG | HEIGHT: 66 IN | HEART RATE: 69 BPM
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
## 2021-06-25 DIAGNOSIS — M62.830 SPASM OF BACK MUSCLES: Primary | ICD-10-CM

## 2021-06-25 PROCEDURE — 99214 PR OFFICE/OUTPT VISIT, EST, LEVL IV, 30-39 MIN: ICD-10-PCS | Mod: S$GLB,,, | Performed by: PHYSICIAN ASSISTANT

## 2021-06-25 PROCEDURE — 99214 OFFICE O/P EST MOD 30 MIN: CPT | Mod: S$GLB,,, | Performed by: PHYSICIAN ASSISTANT

## 2021-06-25 RX ORDER — CYCLOBENZAPRINE HCL 5 MG
5 TABLET ORAL 2 TIMES DAILY PRN
Qty: 14 TABLET | Refills: 0 | Status: SHIPPED | OUTPATIENT
Start: 2021-06-25 | End: 2021-07-02

## 2021-06-25 RX ORDER — KETOROLAC TROMETHAMINE 30 MG/ML
30 INJECTION, SOLUTION INTRAMUSCULAR; INTRAVENOUS
Status: COMPLETED | OUTPATIENT
Start: 2021-06-25 | End: 2021-06-25

## 2021-06-25 RX ADMIN — KETOROLAC TROMETHAMINE 30 MG: 30 INJECTION, SOLUTION INTRAMUSCULAR; INTRAVENOUS at 08:06

## 2021-07-14 ENCOUNTER — OFFICE VISIT (OUTPATIENT)
Dept: FAMILY MEDICINE | Facility: HOSPITAL | Age: 21
End: 2021-07-14
Payer: MEDICAID

## 2021-07-14 ENCOUNTER — HOSPITAL ENCOUNTER (OUTPATIENT)
Dept: RADIOLOGY | Facility: HOSPITAL | Age: 21
Discharge: HOME | End: 2021-07-14
Attending: STUDENT IN AN ORGANIZED HEALTH CARE EDUCATION/TRAINING PROGRAM
Payer: MEDICAID

## 2021-07-14 VITALS
HEIGHT: 66 IN | BODY MASS INDEX: 29.94 KG/M2 | WEIGHT: 186.31 LBS | HEART RATE: 75 BPM | SYSTOLIC BLOOD PRESSURE: 119 MMHG | DIASTOLIC BLOOD PRESSURE: 67 MMHG

## 2021-07-14 DIAGNOSIS — M54.9 DORSALGIA, UNSPECIFIED: ICD-10-CM
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
## 2021-07-14 PROCEDURE — 72100 X-RAY EXAM L-S SPINE 2/3 VWS: CPT | Mod: TC,FY

## 2021-07-14 PROCEDURE — 72070 X-RAY EXAM THORAC SPINE 2VWS: CPT | Mod: 26,,, | Performed by: RADIOLOGY

## 2021-07-14 PROCEDURE — 72070 XR THORACIC SPINE AP LATERAL: ICD-10-PCS | Mod: 26,,, | Performed by: RADIOLOGY

## 2021-07-14 PROCEDURE — 72040 X-RAY EXAM NECK SPINE 2-3 VW: CPT | Mod: 26,,, | Performed by: RADIOLOGY

## 2021-07-14 PROCEDURE — 72100 XR LUMBAR SPINE AP AND LATERAL: ICD-10-PCS | Mod: 26,,, | Performed by: RADIOLOGY

## 2021-07-14 PROCEDURE — 72040 XR CERVICAL SPINE AP LATERAL: ICD-10-PCS | Mod: 26,,, | Performed by: RADIOLOGY

## 2021-07-14 PROCEDURE — 99214 OFFICE O/P EST MOD 30 MIN: CPT | Performed by: STUDENT IN AN ORGANIZED HEALTH CARE EDUCATION/TRAINING PROGRAM

## 2021-07-14 PROCEDURE — 72040 X-RAY EXAM NECK SPINE 2-3 VW: CPT | Mod: TC,FY

## 2021-07-14 PROCEDURE — 72100 X-RAY EXAM L-S SPINE 2/3 VWS: CPT | Mod: 26,,, | Performed by: RADIOLOGY

## 2021-07-14 PROCEDURE — 72070 X-RAY EXAM THORAC SPINE 2VWS: CPT | Mod: TC,FY

## 2021-07-14 RX ORDER — METHOCARBAMOL 500 MG/1
500 TABLET, FILM COATED ORAL 4 TIMES DAILY
Qty: 40 TABLET | Refills: 0 | Status: SHIPPED | OUTPATIENT
Start: 2021-07-14 | End: 2021-07-24

## 2021-07-14 RX ORDER — NAPROXEN 500 MG/1
500 TABLET ORAL 2 TIMES DAILY
Qty: 10 TABLET | Refills: 0 | Status: SHIPPED | OUTPATIENT
Start: 2021-07-14 | End: 2021-07-19

## 2021-07-14 RX ORDER — LIDOCAINE 50 MG/G
1 PATCH TOPICAL DAILY
Qty: 30 PATCH | Refills: 2 | Status: SHIPPED | OUTPATIENT
Start: 2021-07-14 | End: 2022-05-18

## 2021-07-16 ENCOUNTER — PATIENT MESSAGE (OUTPATIENT)
Dept: FAMILY MEDICINE | Facility: HOSPITAL | Age: 21
End: 2021-07-16

## 2021-08-04 ENCOUNTER — OFFICE VISIT (OUTPATIENT)
Dept: URGENT CARE | Facility: CLINIC | Age: 21
End: 2021-08-04
Payer: MEDICAID

## 2021-08-04 VITALS
OXYGEN SATURATION: 96 % | HEART RATE: 69 BPM | TEMPERATURE: 99 F | BODY MASS INDEX: 28.12 KG/M2 | HEIGHT: 66 IN | RESPIRATION RATE: 16 BRPM | SYSTOLIC BLOOD PRESSURE: 134 MMHG | DIASTOLIC BLOOD PRESSURE: 79 MMHG | WEIGHT: 175 LBS

## 2021-08-04 DIAGNOSIS — R51.9 NONINTRACTABLE HEADACHE, UNSPECIFIED CHRONICITY PATTERN, UNSPECIFIED HEADACHE TYPE: Primary | ICD-10-CM

## 2021-08-04 DIAGNOSIS — Z20.822 COUGH WITH EXPOSURE TO COVID-19 VIRUS: ICD-10-CM

## 2021-08-04 DIAGNOSIS — R05.8 COUGH WITH EXPOSURE TO COVID-19 VIRUS: ICD-10-CM

## 2021-08-04 LAB
CTP QC/QA: YES
SARS-COV-2 RDRP RESP QL NAA+PROBE: NEGATIVE

## 2021-08-04 PROCEDURE — 99213 OFFICE O/P EST LOW 20 MIN: CPT | Mod: S$GLB,,, | Performed by: FAMILY MEDICINE

## 2021-08-04 PROCEDURE — 99213 PR OFFICE/OUTPT VISIT, EST, LEVL III, 20-29 MIN: ICD-10-PCS | Mod: S$GLB,,, | Performed by: FAMILY MEDICINE

## 2021-08-04 PROCEDURE — U0002 COVID-19 LAB TEST NON-CDC: HCPCS | Mod: QW,S$GLB,, | Performed by: FAMILY MEDICINE

## 2021-08-04 PROCEDURE — U0002: ICD-10-PCS | Mod: QW,S$GLB,, | Performed by: FAMILY MEDICINE

## 2021-08-17 ENCOUNTER — LAB VISIT (OUTPATIENT)
Dept: LAB | Facility: HOSPITAL | Age: 21
End: 2021-08-17
Attending: STUDENT IN AN ORGANIZED HEALTH CARE EDUCATION/TRAINING PROGRAM
Payer: MEDICAID

## 2021-08-17 ENCOUNTER — OFFICE VISIT (OUTPATIENT)
Dept: FAMILY MEDICINE | Facility: HOSPITAL | Age: 21
End: 2021-08-17
Payer: MEDICAID

## 2021-08-17 VITALS
SYSTOLIC BLOOD PRESSURE: 118 MMHG | WEIGHT: 188.5 LBS | DIASTOLIC BLOOD PRESSURE: 78 MMHG | BODY MASS INDEX: 30.29 KG/M2 | HEIGHT: 66 IN | HEART RATE: 82 BPM

## 2021-08-17 DIAGNOSIS — Z11.3 SCREENING EXAMINATION FOR STD (SEXUALLY TRANSMITTED DISEASE): Primary | ICD-10-CM

## 2021-08-17 DIAGNOSIS — M62.838 TRAPEZIUS MUSCLE SPASM: ICD-10-CM

## 2021-08-17 DIAGNOSIS — Z11.3 SCREENING EXAMINATION FOR STD (SEXUALLY TRANSMITTED DISEASE): ICD-10-CM

## 2021-08-17 LAB — RPR SER QL: NORMAL

## 2021-08-17 PROCEDURE — 99213 OFFICE O/P EST LOW 20 MIN: CPT | Performed by: STUDENT IN AN ORGANIZED HEALTH CARE EDUCATION/TRAINING PROGRAM

## 2021-08-17 PROCEDURE — 86592 SYPHILIS TEST NON-TREP QUAL: CPT | Performed by: STUDENT IN AN ORGANIZED HEALTH CARE EDUCATION/TRAINING PROGRAM

## 2021-08-17 PROCEDURE — 36415 COLL VENOUS BLD VENIPUNCTURE: CPT | Performed by: STUDENT IN AN ORGANIZED HEALTH CARE EDUCATION/TRAINING PROGRAM

## 2021-08-17 PROCEDURE — 87389 HIV-1 AG W/HIV-1&-2 AB AG IA: CPT | Performed by: STUDENT IN AN ORGANIZED HEALTH CARE EDUCATION/TRAINING PROGRAM

## 2021-08-17 RX ORDER — CYCLOBENZAPRINE HCL 10 MG
10 TABLET ORAL 3 TIMES DAILY PRN
Qty: 30 TABLET | Refills: 0 | Status: SHIPPED | OUTPATIENT
Start: 2021-08-17 | End: 2022-05-18

## 2021-08-19 LAB — HIV 1+2 AB+HIV1 P24 AG SERPL QL IA: NEGATIVE

## 2021-08-26 ENCOUNTER — CLINICAL SUPPORT (OUTPATIENT)
Dept: REHABILITATION | Facility: HOSPITAL | Age: 21
End: 2021-08-26
Payer: MEDICAID

## 2021-08-26 DIAGNOSIS — Z74.09 IMPAIRED FUNCTIONAL MOBILITY AND ACTIVITY TOLERANCE: ICD-10-CM

## 2021-08-26 DIAGNOSIS — M54.9 DORSALGIA, UNSPECIFIED: ICD-10-CM
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
## 2021-08-26 DIAGNOSIS — R29.898 DECREASED STRENGTH OF TRUNK AND BACK: ICD-10-CM

## 2021-08-26 PROCEDURE — 97161 PT EVAL LOW COMPLEX 20 MIN: CPT | Mod: PO

## 2021-08-26 PROCEDURE — 97110 THERAPEUTIC EXERCISES: CPT | Mod: PO

## 2021-11-04 ENCOUNTER — OFFICE VISIT (OUTPATIENT)
Dept: FAMILY MEDICINE | Facility: HOSPITAL | Age: 21
End: 2021-11-04
Attending: FAMILY MEDICINE
Payer: MEDICAID

## 2021-11-04 VITALS
HEIGHT: 66 IN | DIASTOLIC BLOOD PRESSURE: 81 MMHG | WEIGHT: 191.81 LBS | BODY MASS INDEX: 30.82 KG/M2 | SYSTOLIC BLOOD PRESSURE: 135 MMHG | HEART RATE: 103 BPM

## 2021-11-04 DIAGNOSIS — J06.9 VIRAL URI WITH COUGH: Primary | ICD-10-CM

## 2021-11-04 PROCEDURE — 99213 OFFICE O/P EST LOW 20 MIN: CPT | Performed by: STUDENT IN AN ORGANIZED HEALTH CARE EDUCATION/TRAINING PROGRAM

## 2021-12-08 ENCOUNTER — HOSPITAL ENCOUNTER (EMERGENCY)
Facility: HOSPITAL | Age: 21
Discharge: HOME | End: 2021-12-08
Attending: EMERGENCY MEDICINE
Payer: MEDICAID

## 2021-12-08 VITALS
WEIGHT: 185 LBS | OXYGEN SATURATION: 98 % | DIASTOLIC BLOOD PRESSURE: 72 MMHG | RESPIRATION RATE: 15 BRPM | BODY MASS INDEX: 30.32 KG/M2 | SYSTOLIC BLOOD PRESSURE: 134 MMHG | TEMPERATURE: 99 F | HEART RATE: 82 BPM

## 2021-12-08 DIAGNOSIS — N30.01 ACUTE CYSTITIS WITH HEMATURIA: ICD-10-CM

## 2021-12-08 DIAGNOSIS — R10.9 ABDOMINAL PAIN, UNSPECIFIED ABDOMINAL LOCATION: Primary | ICD-10-CM

## 2021-12-08 LAB
ALBUMIN SERPL BCP-MCNC: 3.7 G/DL (ref 3.5–5.2)
ALP SERPL-CCNC: 51 U/L (ref 55–135)
ALT SERPL W/O P-5'-P-CCNC: 6 U/L (ref 10–44)
ANION GAP SERPL CALC-SCNC: 12 MMOL/L (ref 8–16)
AST SERPL-CCNC: 15 U/L (ref 10–40)
B-HCG UR QL: NEGATIVE
BACTERIA #/AREA URNS AUTO: ABNORMAL /HPF
BASOPHILS # BLD AUTO: 0.02 K/UL (ref 0–0.2)
BASOPHILS NFR BLD: 0.2 % (ref 0–1.9)
BILIRUB SERPL-MCNC: 0.5 MG/DL (ref 0.1–1)
BILIRUB UR QL STRIP: NEGATIVE
BUN SERPL-MCNC: 7 MG/DL (ref 6–20)
BUN SERPL-MCNC: 8 MG/DL (ref 6–30)
CALCIUM SERPL-MCNC: 9 MG/DL (ref 8.7–10.5)
CHLORIDE SERPL-SCNC: 105 MMOL/L (ref 95–110)
CHLORIDE SERPL-SCNC: 106 MMOL/L (ref 95–110)
CLARITY UR REFRACT.AUTO: ABNORMAL
CO2 SERPL-SCNC: 18 MMOL/L (ref 23–29)
COLOR UR AUTO: YELLOW
CREAT SERPL-MCNC: 0.6 MG/DL (ref 0.5–1.4)
CREAT SERPL-MCNC: 0.7 MG/DL (ref 0.5–1.4)
CTP QC/QA: YES
DIFFERENTIAL METHOD: ABNORMAL
EOSINOPHIL # BLD AUTO: 0.1 K/UL (ref 0–0.5)
EOSINOPHIL NFR BLD: 1 % (ref 0–8)
ERYTHROCYTE [DISTWIDTH] IN BLOOD BY AUTOMATED COUNT: 12.5 % (ref 11.5–14.5)
EST. GFR  (AFRICAN AMERICAN): >60 ML/MIN/1.73 M^2
EST. GFR  (NON AFRICAN AMERICAN): >60 ML/MIN/1.73 M^2
GLUCOSE SERPL-MCNC: 94 MG/DL (ref 70–110)
GLUCOSE SERPL-MCNC: 98 MG/DL (ref 70–110)
GLUCOSE UR QL STRIP: NEGATIVE
HCT VFR BLD AUTO: 35.2 % (ref 37–48.5)
HCT VFR BLD CALC: 35 %PCV (ref 36–54)
HCV AB SERPL QL IA: NEGATIVE
HGB BLD-MCNC: 11.4 G/DL (ref 12–16)
HGB UR QL STRIP: ABNORMAL
HYALINE CASTS UR QL AUTO: 0 /LPF
IMM GRANULOCYTES # BLD AUTO: 0.04 K/UL (ref 0–0.04)
IMM GRANULOCYTES NFR BLD AUTO: 0.4 % (ref 0–0.5)
KETONES UR QL STRIP: NEGATIVE
LEUKOCYTE ESTERASE UR QL STRIP: ABNORMAL
LYMPHOCYTES # BLD AUTO: 3 K/UL (ref 1–4.8)
LYMPHOCYTES NFR BLD: 29.1 % (ref 18–48)
MCH RBC QN AUTO: 29.9 PG (ref 27–31)
MCHC RBC AUTO-ENTMCNC: 32.4 G/DL (ref 32–36)
MCV RBC AUTO: 92 FL (ref 82–98)
MICROSCOPIC COMMENT: ABNORMAL
MONOCYTES # BLD AUTO: 0.6 K/UL (ref 0.3–1)
MONOCYTES NFR BLD: 6 % (ref 4–15)
NEUTROPHILS # BLD AUTO: 6.6 K/UL (ref 1.8–7.7)
NEUTROPHILS NFR BLD: 63.3 % (ref 38–73)
NITRITE UR QL STRIP: NEGATIVE
NRBC BLD-RTO: 0 /100 WBC
PH UR STRIP: 6 [PH] (ref 5–8)
PLATELET # BLD AUTO: 192 K/UL (ref 150–450)
PMV BLD AUTO: 12.1 FL (ref 9.2–12.9)
POC IONIZED CALCIUM: 1.14 MMOL/L (ref 1.06–1.42)
POC TCO2 (MEASURED): 22 MMOL/L (ref 23–29)
POTASSIUM BLD-SCNC: 3.8 MMOL/L (ref 3.5–5.1)
POTASSIUM SERPL-SCNC: 3.8 MMOL/L (ref 3.5–5.1)
PROT SERPL-MCNC: 7.3 G/DL (ref 6–8.4)
PROT UR QL STRIP: ABNORMAL
RBC # BLD AUTO: 3.81 M/UL (ref 4–5.4)
RBC #/AREA URNS AUTO: >100 /HPF (ref 0–4)
SAMPLE: ABNORMAL
SODIUM BLD-SCNC: 137 MMOL/L (ref 136–145)
SODIUM SERPL-SCNC: 135 MMOL/L (ref 136–145)
SP GR UR STRIP: 1.01 (ref 1–1.03)
SQUAMOUS #/AREA URNS AUTO: 2 /HPF
URN SPEC COLLECT METH UR: ABNORMAL
WBC # BLD AUTO: 10.42 K/UL (ref 3.9–12.7)
WBC #/AREA URNS AUTO: >100 /HPF (ref 0–5)

## 2021-12-08 PROCEDURE — 87088 URINE BACTERIA CULTURE: CPT | Performed by: EMERGENCY MEDICINE

## 2021-12-08 PROCEDURE — 80047 BASIC METABLC PNL IONIZED CA: CPT

## 2021-12-08 PROCEDURE — 81025 URINE PREGNANCY TEST: CPT | Performed by: EMERGENCY MEDICINE

## 2021-12-08 PROCEDURE — 99285 EMERGENCY DEPT VISIT HI MDM: CPT | Mod: 25

## 2021-12-08 PROCEDURE — 87086 URINE CULTURE/COLONY COUNT: CPT | Performed by: EMERGENCY MEDICINE

## 2021-12-08 PROCEDURE — 87077 CULTURE AEROBIC IDENTIFY: CPT | Performed by: EMERGENCY MEDICINE

## 2021-12-08 PROCEDURE — 99284 EMERGENCY DEPT VISIT MOD MDM: CPT | Mod: ,,, | Performed by: EMERGENCY MEDICINE

## 2021-12-08 PROCEDURE — 86803 HEPATITIS C AB TEST: CPT | Performed by: EMERGENCY MEDICINE

## 2021-12-08 PROCEDURE — 63600175 PHARM REV CODE 636 W HCPCS: Performed by: EMERGENCY MEDICINE

## 2021-12-08 PROCEDURE — 82330 ASSAY OF CALCIUM: CPT

## 2021-12-08 PROCEDURE — 96375 TX/PRO/DX INJ NEW DRUG ADDON: CPT

## 2021-12-08 PROCEDURE — 96376 TX/PRO/DX INJ SAME DRUG ADON: CPT

## 2021-12-08 PROCEDURE — 99284 PR EMERGENCY DEPT VISIT,LEVEL IV: ICD-10-PCS | Mod: ,,, | Performed by: EMERGENCY MEDICINE

## 2021-12-08 PROCEDURE — 81001 URINALYSIS AUTO W/SCOPE: CPT | Performed by: EMERGENCY MEDICINE

## 2021-12-08 PROCEDURE — 85025 COMPLETE CBC W/AUTO DIFF WBC: CPT | Performed by: EMERGENCY MEDICINE

## 2021-12-08 PROCEDURE — 80053 COMPREHEN METABOLIC PANEL: CPT | Performed by: EMERGENCY MEDICINE

## 2021-12-08 PROCEDURE — 96374 THER/PROPH/DIAG INJ IV PUSH: CPT

## 2021-12-08 PROCEDURE — 87186 SC STD MICRODIL/AGAR DIL: CPT | Performed by: EMERGENCY MEDICINE

## 2021-12-08 RX ORDER — CEFTRIAXONE 1 G/1
1 INJECTION, POWDER, FOR SOLUTION INTRAMUSCULAR; INTRAVENOUS
Status: COMPLETED | OUTPATIENT
Start: 2021-12-08 | End: 2021-12-08

## 2021-12-08 RX ORDER — MORPHINE SULFATE 4 MG/ML
4 INJECTION, SOLUTION INTRAMUSCULAR; INTRAVENOUS
Status: COMPLETED | OUTPATIENT
Start: 2021-12-08 | End: 2021-12-08

## 2021-12-08 RX ORDER — KETOROLAC TROMETHAMINE 30 MG/ML
15 INJECTION, SOLUTION INTRAMUSCULAR; INTRAVENOUS
Status: COMPLETED | OUTPATIENT
Start: 2021-12-08 | End: 2021-12-08

## 2021-12-08 RX ORDER — CEPHALEXIN 500 MG/1
500 CAPSULE ORAL EVERY 8 HOURS
Qty: 20 CAPSULE | Refills: 0 | Status: SHIPPED | OUTPATIENT
Start: 2021-12-08 | End: 2021-12-15

## 2021-12-08 RX ORDER — IBUPROFEN 600 MG/1
600 TABLET ORAL EVERY 6 HOURS PRN
Qty: 20 TABLET | Refills: 0 | Status: SHIPPED | OUTPATIENT
Start: 2021-12-08 | End: 2022-05-18

## 2021-12-08 RX ORDER — ONDANSETRON 2 MG/ML
4 INJECTION INTRAMUSCULAR; INTRAVENOUS
Status: COMPLETED | OUTPATIENT
Start: 2021-12-08 | End: 2021-12-08

## 2021-12-08 RX ORDER — PHENAZOPYRIDINE HYDROCHLORIDE 200 MG/1
200 TABLET, FILM COATED ORAL 3 TIMES DAILY
Qty: 6 TABLET | Refills: 0 | Status: SHIPPED | OUTPATIENT
Start: 2021-12-08 | End: 2021-12-18

## 2021-12-08 RX ADMIN — CEFTRIAXONE 1 G: 1 INJECTION, POWDER, FOR SOLUTION INTRAMUSCULAR; INTRAVENOUS at 02:12

## 2021-12-08 RX ADMIN — MORPHINE SULFATE 4 MG: 4 INJECTION INTRAVENOUS at 02:12

## 2021-12-08 RX ADMIN — ONDANSETRON 4 MG: 2 INJECTION INTRAMUSCULAR; INTRAVENOUS at 02:12

## 2021-12-08 RX ADMIN — MORPHINE SULFATE 4 MG: 4 INJECTION INTRAVENOUS at 03:12

## 2021-12-08 RX ADMIN — ONDANSETRON 4 MG: 2 INJECTION INTRAMUSCULAR; INTRAVENOUS at 01:12

## 2021-12-08 RX ADMIN — KETOROLAC TROMETHAMINE 15 MG: 30 INJECTION, SOLUTION INTRAMUSCULAR; INTRAVENOUS at 01:12

## 2021-12-10 LAB — BACTERIA UR CULT: ABNORMAL

## 2022-03-09 ENCOUNTER — OFFICE VISIT (OUTPATIENT)
Dept: URGENT CARE | Facility: CLINIC | Age: 22
End: 2022-03-09
Payer: MEDICAID

## 2022-03-09 VITALS
RESPIRATION RATE: 18 BRPM | TEMPERATURE: 98 F | DIASTOLIC BLOOD PRESSURE: 75 MMHG | BODY MASS INDEX: 30.82 KG/M2 | WEIGHT: 185 LBS | OXYGEN SATURATION: 98 % | HEART RATE: 69 BPM | HEIGHT: 65 IN | SYSTOLIC BLOOD PRESSURE: 124 MMHG

## 2022-03-09 DIAGNOSIS — J06.9 UPPER RESPIRATORY VIRUS: ICD-10-CM

## 2022-03-09 DIAGNOSIS — H65.02 NON-RECURRENT ACUTE SEROUS OTITIS MEDIA OF LEFT EAR: Primary | ICD-10-CM

## 2022-03-09 DIAGNOSIS — J02.9 SORE THROAT: ICD-10-CM

## 2022-03-09 LAB
CTP QC/QA: YES
CTP QC/QA: YES
POC MOLECULAR INFLUENZA A AGN: NEGATIVE
POC MOLECULAR INFLUENZA B AGN: NEGATIVE
SARS-COV-2 RDRP RESP QL NAA+PROBE: NEGATIVE

## 2022-03-09 PROCEDURE — 87502 INFLUENZA DNA AMP PROBE: CPT | Mod: QW,S$GLB,, | Performed by: PHYSICIAN ASSISTANT

## 2022-03-09 PROCEDURE — 99213 OFFICE O/P EST LOW 20 MIN: CPT | Mod: S$GLB,CS,, | Performed by: PHYSICIAN ASSISTANT

## 2022-03-09 PROCEDURE — 3008F PR BODY MASS INDEX (BMI) DOCUMENTED: ICD-10-PCS | Mod: CPTII,S$GLB,, | Performed by: PHYSICIAN ASSISTANT

## 2022-03-09 PROCEDURE — 1160F RVW MEDS BY RX/DR IN RCRD: CPT | Mod: CPTII,S$GLB,, | Performed by: PHYSICIAN ASSISTANT

## 2022-03-09 PROCEDURE — 3074F SYST BP LT 130 MM HG: CPT | Mod: CPTII,S$GLB,, | Performed by: PHYSICIAN ASSISTANT

## 2022-03-09 PROCEDURE — 99213 PR OFFICE/OUTPT VISIT, EST, LEVL III, 20-29 MIN: ICD-10-PCS | Mod: S$GLB,CS,, | Performed by: PHYSICIAN ASSISTANT

## 2022-03-09 PROCEDURE — 3074F PR MOST RECENT SYSTOLIC BLOOD PRESSURE < 130 MM HG: ICD-10-PCS | Mod: CPTII,S$GLB,, | Performed by: PHYSICIAN ASSISTANT

## 2022-03-09 PROCEDURE — 1159F PR MEDICATION LIST DOCUMENTED IN MEDICAL RECORD: ICD-10-PCS | Mod: CPTII,S$GLB,, | Performed by: PHYSICIAN ASSISTANT

## 2022-03-09 PROCEDURE — 3078F DIAST BP <80 MM HG: CPT | Mod: CPTII,S$GLB,, | Performed by: PHYSICIAN ASSISTANT

## 2022-03-09 PROCEDURE — 1160F PR REVIEW ALL MEDS BY PRESCRIBER/CLIN PHARMACIST DOCUMENTED: ICD-10-PCS | Mod: CPTII,S$GLB,, | Performed by: PHYSICIAN ASSISTANT

## 2022-03-09 PROCEDURE — 3078F PR MOST RECENT DIASTOLIC BLOOD PRESSURE < 80 MM HG: ICD-10-PCS | Mod: CPTII,S$GLB,, | Performed by: PHYSICIAN ASSISTANT

## 2022-03-09 PROCEDURE — U0002: ICD-10-PCS | Mod: QW,S$GLB,, | Performed by: PHYSICIAN ASSISTANT

## 2022-03-09 PROCEDURE — 87502 POCT INFLUENZA A/B MOLECULAR: ICD-10-PCS | Mod: QW,S$GLB,, | Performed by: PHYSICIAN ASSISTANT

## 2022-03-09 PROCEDURE — 3008F BODY MASS INDEX DOCD: CPT | Mod: CPTII,S$GLB,, | Performed by: PHYSICIAN ASSISTANT

## 2022-03-09 PROCEDURE — U0002 COVID-19 LAB TEST NON-CDC: HCPCS | Mod: QW,S$GLB,, | Performed by: PHYSICIAN ASSISTANT

## 2022-03-09 PROCEDURE — 1159F MED LIST DOCD IN RCRD: CPT | Mod: CPTII,S$GLB,, | Performed by: PHYSICIAN ASSISTANT

## 2022-03-09 RX ORDER — SERTRALINE HYDROCHLORIDE 25 MG/1
25 TABLET, FILM COATED ORAL DAILY
COMMUNITY
Start: 2021-11-18 | End: 2022-05-18

## 2022-03-09 RX ORDER — FLUTICASONE PROPIONATE 50 MCG
1 SPRAY, SUSPENSION (ML) NASAL DAILY
Qty: 18.2 ML | Refills: 0 | Status: SHIPPED | OUTPATIENT
Start: 2022-03-09 | End: 2022-05-18

## 2022-03-09 RX ORDER — SERTRALINE HYDROCHLORIDE 50 MG/1
TABLET, FILM COATED ORAL
COMMUNITY
Start: 2022-01-01 | End: 2022-05-18

## 2022-03-09 RX ORDER — AMOXICILLIN AND CLAVULANATE POTASSIUM 875; 125 MG/1; MG/1
1 TABLET, FILM COATED ORAL 2 TIMES DAILY
Qty: 14 TABLET | Refills: 0 | Status: SHIPPED | OUTPATIENT
Start: 2022-03-09 | End: 2022-03-16

## 2022-03-09 RX ORDER — METRONIDAZOLE 500 MG/1
500 TABLET ORAL 2 TIMES DAILY
COMMUNITY
Start: 2022-02-17 | End: 2022-03-09

## 2022-03-09 RX ORDER — SERTRALINE HYDROCHLORIDE 50 MG/1
50 TABLET, FILM COATED ORAL DAILY
COMMUNITY
Start: 2022-03-08 | End: 2022-05-18

## 2022-03-09 RX ORDER — PROMETHAZINE HYDROCHLORIDE AND DEXTROMETHORPHAN HYDROBROMIDE 6.25; 15 MG/5ML; MG/5ML
5 SYRUP ORAL EVERY 8 HOURS PRN
Qty: 118 ML | Refills: 0 | Status: SHIPPED | OUTPATIENT
Start: 2022-03-09 | End: 2022-03-16

## 2022-03-09 NOTE — PATIENT INSTRUCTIONS
PLEASE READ YOUR DISCHARGE INSTRUCTIONS ENTIRELY AS IT CONTAINS IMPORTANT INFORMATION.  - Rest.    - Drink plenty of fluids.    - Tylenol or Ibuprofen as directed as needed for fever/pain.    - If you were prescribed antibiotics, please take them to completion.  - If you are female and on birth control pills - please use additional methods of contraception to prevent pregnancy while on antibiotics and for one cycle after.   - If you were prescribed a narcotic medication, a cough syrup, or a muscle relaxer, do not drive or operate heavy equipment or machinery while taking these medications, as they can cause drowsiness.   - If you smoke, please stop smoking.  -You must understand that you've received an Urgent Care treatment only and that you may be released before all your medical problems are known or treated. You, the patient, will    arrange for follow up care as instructed. Please arrange follow up with your primary medical clinic as soon as possible.   - Follow up with your PCP or specialty clinic as directed in the next 1-2 weeks if not improved or as needed.  You can call [phone] to schedule an appointment with the appropriate provider.    - Please return to Urgent Care or to the Emergency Department if your symptoms worsen.    Patient aware and verbalized understanding.

## 2022-03-09 NOTE — LETTER
March 9, 2022      [facility]  [address] [address]  Phone: [phone]  Fax: [phone]       Patient: Rosita Dyson   YOB: 2000  Date of Visit: 03/09/2022    To Whom It May Concern:    Harinder Dyson  was at [facility] on 03/09/2022. The patient may return to work/school on 3/12/2022 with no restrictions. If you have any questions or concerns, or if I can be of further assistance, please do not hesitate to contact me.    Sincerely,    Yancy Duffy PA-C

## 2022-03-09 NOTE — PROGRESS NOTES
"Subjective:       Patient ID: Rosita Dyson is a 21 y.o. female.    Vitals:  height is 5' 5" (1.651 m) and weight is 83.9 kg (185 lb). Her temperature is 98.1 °F (36.7 °C). Her blood pressure is 124/75 and her pulse is 69. Her respiration is 18 and oxygen saturation is 98%.     Chief Complaint: Sore Throat (Entered by patient)    Ms. Dyson for evaluation of ear pain, headache, sore throat, congestion, chills, sinus pressure that started last Friday.  She denies any COVID-19 exposures.  She denies any fevers, shortness of breath, chest pain, leg swelling, nausea, vomiting, diarrhea, anosmia or ageusia.  She has taken antihistamine and decongestant with little relief.         Sore Throat   This is a new problem. The current episode started in the past 7 days. The problem has been gradually worsening. Neither side of throat is experiencing more pain than the other. There has been no fever. The pain is at a severity of 8/10. The pain is moderate. Associated symptoms include congestion, coughing, ear pain, headaches, a hoarse voice and a plugged ear sensation. Pertinent negatives include no abdominal pain, diarrhea, drooling, ear discharge, shortness of breath, trouble swallowing or vomiting. Treatments tried: otc cough meds. The treatment provided no relief.       Constitution: Negative for appetite change, chills, sweating, fatigue and fever.   HENT: Positive for ear pain, congestion, sinus pressure and sore throat. Negative for ear discharge, hearing loss, drooling, postnasal drip, sinus pain and trouble swallowing.    Neck: Negative for neck stiffness and painful lymph nodes.   Cardiovascular: Negative for chest trauma, chest pain, leg swelling, palpitations, sob on exertion and passing out.   Eyes: Negative for eye pain and blurred vision.   Respiratory: Positive for cough. Negative for chest tightness, sputum production, shortness of breath and wheezing.    Gastrointestinal: Negative for abdominal pain, " nausea, vomiting and diarrhea.   Genitourinary: Negative for dysuria, frequency and urgency.   Musculoskeletal: Negative for joint pain, joint swelling, muscle cramps and muscle ache.   Skin: Negative for rash.   Allergic/Immunologic: Negative for itching and sneezing.   Neurological: Positive for headaches. Negative for dizziness, history of vertigo, light-headedness, passing out, facial drooping, speech difficulty, coordination disturbances, loss of balance and altered mental status.   Hematologic/Lymphatic: Negative for swollen lymph nodes and easy bruising/bleeding. Does not bruise/bleed easily.   Psychiatric/Behavioral: Negative for altered mental status.       Objective:      Physical Exam   Constitutional: She is oriented to person, place, and time. She appears well-developed. She is cooperative.  Non-toxic appearance. She does not appear ill. No distress.   HENT:   Head: Normocephalic and atraumatic.   Ears:   Right Ear: Hearing, tympanic membrane, external ear and ear canal normal. Tympanic membrane is not erythematous. No middle ear effusion.   Left Ear: Hearing, external ear and ear canal normal. Tympanic membrane is erythematous. A middle ear effusion is present.   Nose: Rhinorrhea present. No mucosal edema or nasal deformity. No epistaxis. Right sinus exhibits maxillary sinus tenderness. Right sinus exhibits no frontal sinus tenderness. Left sinus exhibits maxillary sinus tenderness. Left sinus exhibits no frontal sinus tenderness.   Mouth/Throat: Uvula is midline, oropharynx is clear and moist and mucous membranes are normal. No trismus in the jaw. Normal dentition. No uvula swelling. No oropharyngeal exudate, posterior oropharyngeal edema or posterior oropharyngeal erythema. No tonsillar exudate.   Eyes: Conjunctivae and lids are normal. No scleral icterus.   Neck: Trachea normal and phonation normal. Neck supple. No edema present. No erythema present. No neck rigidity present.   Cardiovascular:  Normal rate, regular rhythm, normal heart sounds and normal pulses.   Pulmonary/Chest: Effort normal and breath sounds normal. No stridor. No respiratory distress. She has no decreased breath sounds. She has no wheezes. She has no rhonchi. She has no rales.   Abdominal: Normal appearance.   Musculoskeletal: Normal range of motion.         General: No deformity. Normal range of motion.   Lymphadenopathy:     She has no cervical adenopathy.   Neurological: She is alert and oriented to person, place, and time. She exhibits normal muscle tone. Coordination normal.   Skin: Skin is warm, dry, intact, not diaphoretic and not pale.   Psychiatric: Her speech is normal and behavior is normal. Judgment and thought content normal.   Nursing note and vitals reviewed.        Results for orders placed or performed in visit on 03/09/22   POCT Influenza A/B MOLECULAR   Result Value Ref Range    POC Molecular Influenza A Ag Negative Negative, Not Reported    POC Molecular Influenza B Ag Negative Negative, Not Reported     Acceptable Yes    POCT COVID-19 Rapid Screening   Result Value Ref Range    POC Rapid COVID Negative Negative     Acceptable Yes        Assessment:       1. Non-recurrent acute serous otitis media of left ear    2. Sore throat    3. Upper respiratory virus          Plan:         Non-recurrent acute serous otitis media of left ear    Sore throat  -     POCT Influenza A/B MOLECULAR  -     POCT COVID-19 Rapid Screening    Upper respiratory virus    Other orders  -     amoxicillin-clavulanate 875-125mg (AUGMENTIN) 875-125 mg per tablet; Take 1 tablet by mouth 2 (two) times daily. for 7 days  Dispense: 14 tablet; Refill: 0  -     fluticasone propionate (FLONASE) 50 mcg/actuation nasal spray; 1 spray (50 mcg total) by Each Nostril route once daily.  Dispense: 18.2 mL; Refill: 0  -     promethazine-dextromethorphan (PROMETHAZINE-DM) 6.25-15 mg/5 mL Syrp; Take 5 mLs by mouth every 8 (eight)  hours as needed (cough).  Dispense: 118 mL; Refill: 0    Diagnoses and plan discussed with the patient, as well as the expected course and duration of her symptoms. All questions and concerns were addressed prior to discharge.  She was advised to follow up with her PCP within 1 week if symptoms do not improve. Emergency department precautions were given. Patient verbalized understanding and was happy with the plan of care.   Note dictated with voice recognition software, please excuse any grammatical errors.    Patient Instructions   PLEASE READ YOUR DISCHARGE INSTRUCTIONS ENTIRELY AS IT CONTAINS IMPORTANT INFORMATION.  - Rest.    - Drink plenty of fluids.    - Tylenol or Ibuprofen as directed as needed for fever/pain.    - If you were prescribed antibiotics, please take them to completion.  - If you are female and on birth control pills - please use additional methods of contraception to prevent pregnancy while on antibiotics and for one cycle after.   - If you were prescribed a narcotic medication, a cough syrup, or a muscle relaxer, do not drive or operate heavy equipment or machinery while taking these medications, as they can cause drowsiness.   - If you smoke, please stop smoking.  -You must understand that you've received an Urgent Care treatment only and that you may be released before all your medical problems are known or treated. You, the patient, will    arrange for follow up care as instructed. Please arrange follow up with your primary medical clinic as soon as possible.   - Follow up with your PCP or specialty clinic as directed in the next 1-2 weeks if not improved or as needed.  You can call [phone] to schedule an appointment with the appropriate provider.    - Please return to Urgent Care or to the Emergency Department if your symptoms worsen.    Patient aware and verbalized understanding.

## 2022-04-24 ENCOUNTER — OFFICE VISIT (OUTPATIENT)
Dept: URGENT CARE | Facility: CLINIC | Age: 22
End: 2022-04-24
Payer: MEDICAID

## 2022-04-24 VITALS
HEART RATE: 87 BPM | TEMPERATURE: 98 F | RESPIRATION RATE: 18 BRPM | DIASTOLIC BLOOD PRESSURE: 74 MMHG | HEIGHT: 65 IN | WEIGHT: 185 LBS | SYSTOLIC BLOOD PRESSURE: 115 MMHG | OXYGEN SATURATION: 99 % | BODY MASS INDEX: 30.82 KG/M2

## 2022-04-24 DIAGNOSIS — J40 BRONCHITIS: ICD-10-CM

## 2022-04-24 DIAGNOSIS — S69.91XA INJURY OF RIGHT HAND, INITIAL ENCOUNTER: Primary | ICD-10-CM

## 2022-04-24 DIAGNOSIS — J06.9 ACUTE URI: ICD-10-CM

## 2022-04-24 DIAGNOSIS — S60.221A CONTUSION OF RIGHT HAND, INITIAL ENCOUNTER: ICD-10-CM

## 2022-04-24 PROCEDURE — 1159F PR MEDICATION LIST DOCUMENTED IN MEDICAL RECORD: ICD-10-PCS | Mod: CPTII,S$GLB,, | Performed by: NURSE PRACTITIONER

## 2022-04-24 PROCEDURE — 3078F PR MOST RECENT DIASTOLIC BLOOD PRESSURE < 80 MM HG: ICD-10-PCS | Mod: CPTII,S$GLB,, | Performed by: NURSE PRACTITIONER

## 2022-04-24 PROCEDURE — 99213 PR OFFICE/OUTPT VISIT, EST, LEVL III, 20-29 MIN: ICD-10-PCS | Mod: S$GLB,,, | Performed by: NURSE PRACTITIONER

## 2022-04-24 PROCEDURE — 3008F PR BODY MASS INDEX (BMI) DOCUMENTED: ICD-10-PCS | Mod: CPTII,S$GLB,, | Performed by: NURSE PRACTITIONER

## 2022-04-24 PROCEDURE — 3078F DIAST BP <80 MM HG: CPT | Mod: CPTII,S$GLB,, | Performed by: NURSE PRACTITIONER

## 2022-04-24 PROCEDURE — 3074F SYST BP LT 130 MM HG: CPT | Mod: CPTII,S$GLB,, | Performed by: NURSE PRACTITIONER

## 2022-04-24 PROCEDURE — 73130 X-RAY EXAM OF HAND: CPT | Mod: FY,RT,S$GLB, | Performed by: RADIOLOGY

## 2022-04-24 PROCEDURE — 73130 XR HAND COMPLETE 3 VIEW RIGHT: ICD-10-PCS | Mod: FY,RT,S$GLB, | Performed by: RADIOLOGY

## 2022-04-24 PROCEDURE — 99213 OFFICE O/P EST LOW 20 MIN: CPT | Mod: S$GLB,,, | Performed by: NURSE PRACTITIONER

## 2022-04-24 PROCEDURE — 3074F PR MOST RECENT SYSTOLIC BLOOD PRESSURE < 130 MM HG: ICD-10-PCS | Mod: CPTII,S$GLB,, | Performed by: NURSE PRACTITIONER

## 2022-04-24 PROCEDURE — 3008F BODY MASS INDEX DOCD: CPT | Mod: CPTII,S$GLB,, | Performed by: NURSE PRACTITIONER

## 2022-04-24 PROCEDURE — 1160F PR REVIEW ALL MEDS BY PRESCRIBER/CLIN PHARMACIST DOCUMENTED: ICD-10-PCS | Mod: CPTII,S$GLB,, | Performed by: NURSE PRACTITIONER

## 2022-04-24 PROCEDURE — 1160F RVW MEDS BY RX/DR IN RCRD: CPT | Mod: CPTII,S$GLB,, | Performed by: NURSE PRACTITIONER

## 2022-04-24 PROCEDURE — 1159F MED LIST DOCD IN RCRD: CPT | Mod: CPTII,S$GLB,, | Performed by: NURSE PRACTITIONER

## 2022-04-24 RX ORDER — PROMETHAZINE HYDROCHLORIDE AND DEXTROMETHORPHAN HYDROBROMIDE 6.25; 15 MG/5ML; MG/5ML
5 SYRUP ORAL
Qty: 118 ML | Refills: 0 | Status: SHIPPED | OUTPATIENT
Start: 2022-04-24 | End: 2022-05-18

## 2022-04-24 NOTE — PROGRESS NOTES
"Subjective:       Patient ID: Rosita Dyson is a 21 y.o. female.    Vitals:  height is 5' 5" (1.651 m) and weight is 83.9 kg (185 lb). Her temperature is 97.5 °F (36.4 °C). Her blood pressure is 115/74 and her pulse is 87. Her respiration is 18 and oxygen saturation is 99%.     Chief Complaint: Cough and Hand Injury    This is a 21 y.o. female who presents today with a chief complaint of  cough, runny nose and congestion around two weeks ago.  Pt also reports that yesterday she injured her right hand. She fell and landed on her knuckles.  denies fever, body aches or chills, denies  wheezing or shortness of breath, denies nausea, vomiting, diarrhea or abdominal pain, denies chest pain or dizziness positional lightheadedness, denies sore throat or trouble swallowing, denies loss of taste or smell, or any other symptoms      Cough  This is a chronic problem. The current episode started 1 to 4 weeks ago. The problem has been gradually worsening. The cough is non-productive. Associated symptoms include ear congestion, nasal congestion, postnasal drip and rhinorrhea. Pertinent negatives include no chest pain, chills, fever, sore throat, shortness of breath or wheezing. Nothing aggravates the symptoms. She has tried OTC cough suppressant and prescription cough suppressant for the symptoms. The treatment provided no relief. There is no history of environmental allergies.   Hand Injury   Her dominant hand is their right hand. The incident occurred 12 to 24 hours ago. The incident occurred at home. The injury mechanism was a fall. The pain is present in the right hand. The quality of the pain is described as aching. The pain does not radiate. The pain is at a severity of 7/10. The pain has been constant since the incident. Pertinent negatives include no chest pain. The symptoms are aggravated by movement and palpation. She has tried NSAIDs, ice and acetaminophen for the symptoms. The treatment provided no relief. "       Constitution: Negative for chills, sweating, fatigue and fever.   HENT: Positive for postnasal drip. Negative for sinus pain, sinus pressure and sore throat.    Cardiovascular: Negative for chest pain.   Respiratory: Positive for cough. Negative for chest tightness, sputum production, shortness of breath and wheezing.    Gastrointestinal: Negative for abdominal pain, nausea, vomiting, constipation and diarrhea.   Musculoskeletal: Positive for pain.   Allergic/Immunologic: Negative for environmental allergies and seasonal allergies.   Neurological: Negative for dizziness and light-headedness.       Objective:      Physical Exam   Constitutional: She is oriented to person, place, and time. She appears well-developed. She is cooperative.  Non-toxic appearance. She does not appear ill. No distress.   HENT:   Head: Normocephalic and atraumatic.   Ears:   Right Ear: Hearing, tympanic membrane, external ear and ear canal normal.   Left Ear: Hearing, tympanic membrane, external ear and ear canal normal.   Nose: Nose normal. No mucosal edema, rhinorrhea or nasal deformity. No epistaxis. Right sinus exhibits no maxillary sinus tenderness and no frontal sinus tenderness. Left sinus exhibits no maxillary sinus tenderness and no frontal sinus tenderness.   Mouth/Throat: Uvula is midline, oropharynx is clear and moist and mucous membranes are normal. No trismus in the jaw. Normal dentition. No uvula swelling. No oropharyngeal exudate, posterior oropharyngeal edema or posterior oropharyngeal erythema.   Eyes: Conjunctivae and lids are normal. No scleral icterus.   Neck: Trachea normal and phonation normal. Neck supple. No edema present. No erythema present. No neck rigidity present.   Cardiovascular: Normal rate, regular rhythm, normal heart sounds and normal pulses.   Pulmonary/Chest: Effort normal and breath sounds normal. No stridor. No respiratory distress. She has no decreased breath sounds. She has no wheezes. She has  no rhonchi. She has no rales.   Abdominal: Normal appearance.   Musculoskeletal: Normal range of motion.         General: No deformity. Normal range of motion.   Neurological: She is alert and oriented to person, place, and time. She exhibits normal muscle tone. Coordination normal.   Skin: Skin is warm, dry, intact, not diaphoretic and not pale.   Psychiatric: Her speech is normal and behavior is normal. Judgment and thought content normal.   Nursing note and vitals reviewed.        X-Ray Hand 3 view Right    Result Date: 4/24/2022  EXAMINATION: XR HAND COMPLETE 3 VIEW RIGHT CLINICAL HISTORY: Unspecified injury of right wrist, hand and finger(s), initial encounter TECHNIQUE: PA, lateral, and oblique views of the right hand were performed. COMPARISON: None FINDINGS: No evidence of acute displaced fracture, dislocation, or osseous destructive process.  No radiopaque retained foreign body seen.     No acute osseous abnormality identified. Electronically signed by: Rodrigo Grigsby MD Date:    04/24/2022 Time:    17:24      Patient in no acute distress.  Vitals reassuring.  X-ray results discussed with patient detail.  Christ advised.  Medication prescribed and over-the-counter medication discussed with patient at length.  Chest x-ray discussed, patient declined at this time.  Discussed results/diagnosis/plan in depth with patient in clinic. Strict precautions given to patient to monitor for worsening signs and symptoms. Advised to follow up with primary.All questions answered. Strict ER precautions given. If your symptoms worsens or fail to improve you should go to the Emergency Room. Discharge and follow-up instructions given verbally/printed. Discharge and follow-up instructions discussed with the patient who expressed understanding and willingness to comply with my recommendations.Patient voiced understanding and in agreement with current treatment plan.     Please be advised this text was dictated with M*Modal  software and may contain errors due to translation.      Assessment:       1. Injury of right hand, initial encounter    2. Contusion of right hand, initial encounter    3. Bronchitis    4. Acute URI          Plan:         Injury of right hand, initial encounter  -     X-Ray Hand 3 view Right; Future; Expected date: 04/24/2022    Contusion of right hand, initial encounter    Bronchitis    Acute URI    Other orders  -     promethazine-dextromethorphan (PROMETHAZINE-DM) 6.25-15 mg/5 mL Syrp; Take 5 mLs by mouth every 4 to 6 hours as needed (cough). 5 mL every 4 to 6 hours; maximum: 30 mL in 24 hours  Dispense: 118 mL; Refill: 0                 Patient Instructions   PLEASE READ YOUR DISCHARGE INSTRUCTIONS ENTIRELY AS IT CONTAINS IMPORTANT INFORMATION.        Do not drive while taking the cough syrup - best to take it at night before going to sleep. However, you can take it during the day (every 4-6 hours) if you do not have to drive or operate machinery. This medication will make you drowsy. Try taking half a dose first to see how it affects you.      Please return or see your primary care doctor if you develop new or worsening symptoms.     Please arrange follow up with your primary medical clinic as soon as possible. You must understand that you've received an Urgent Care treatment only and that you may be released before all of your medical problems are known or treated. You, the patient, will arrange for follow up as instructed. If your symptoms worsen or fail to improve you should go to the Emergency Room.

## 2022-04-24 NOTE — PATIENT INSTRUCTIONS
PLEASE READ YOUR DISCHARGE INSTRUCTIONS ENTIRELY AS IT CONTAINS IMPORTANT INFORMATION.        Do not drive while taking the cough syrup - best to take it at night before going to sleep. However, you can take it during the day (every 4-6 hours) if you do not have to drive or operate machinery. This medication will make you drowsy. Try taking half a dose first to see how it affects you.      Please return or see your primary care doctor if you develop new or worsening symptoms.     Please arrange follow up with your primary medical clinic as soon as possible. You must understand that you've received an Urgent Care treatment only and that you may be released before all of your medical problems are known or treated. You, the patient, will arrange for follow up as instructed. If your symptoms worsen or fail to improve you should go to the Emergency Room.

## 2022-05-17 ENCOUNTER — OFFICE VISIT (OUTPATIENT)
Dept: FAMILY MEDICINE | Facility: HOSPITAL | Age: 22
End: 2022-05-17
Attending: FAMILY MEDICINE
Payer: MEDICAID

## 2022-05-17 VITALS
BODY MASS INDEX: 32.91 KG/M2 | WEIGHT: 197.56 LBS | HEIGHT: 65 IN | DIASTOLIC BLOOD PRESSURE: 81 MMHG | HEART RATE: 66 BPM | SYSTOLIC BLOOD PRESSURE: 119 MMHG

## 2022-05-17 DIAGNOSIS — R41.840 INATTENTION: Primary | ICD-10-CM

## 2022-05-17 PROCEDURE — 99213 OFFICE O/P EST LOW 20 MIN: CPT | Performed by: STUDENT IN AN ORGANIZED HEALTH CARE EDUCATION/TRAINING PROGRAM

## 2022-05-17 NOTE — PROGRESS NOTES
"Progress Note  [facility]      Chief Complaint:   Chief Complaint   Patient presents with    ADHD     tested        Subjective:    Rosita Dyson is a 21 y.o.female with no pertinent history who is following here for ADHD testing. States she is going to start school soon and wants to be evaluated so she can focus better. States she has had problems focusing since she was younger but when she was evaluated she was told she does not have ADHD. She is concerned because it takes her a lot of time to study and get things done. She is not able to focus on daily activities.    Review of Systems    Review of Systems   Constitutional: Negative for chills, fever and weight loss.   Eyes: Negative for blurred vision.   Respiratory: Negative for cough, hemoptysis, sputum production and shortness of breath.    Cardiovascular: Negative for chest pain, palpitations, orthopnea, claudication and leg swelling.   Gastrointestinal: Negative for abdominal pain, blood in stool, constipation, diarrhea, heartburn, melena, nausea and vomiting.   Genitourinary: Negative for dysuria, hematuria and urgency.   Skin: Negative for itching and rash.   Neurological: Negative for dizziness, tingling, weakness and headaches.   Psychiatric/Behavioral: Negative for depression, substance abuse and suicidal ideas. The patient is not nervous/anxious and does not have insomnia.         Past medical, past surgical, social, and family history reviewed.    Objective:    /81   Pulse 66   Ht 5' 5" (1.651 m)   Wt 89.6 kg (197 lb 8.5 oz)   LMP 04/25/2022   BMI 32.87 kg/m²     Physical Exam:  Physical Exam  Vitals and nursing note reviewed.   Constitutional:       General: She is not in acute distress.     Appearance: She is not diaphoretic.   HENT:      Head: Normocephalic and atraumatic.   Cardiovascular:      Rate and Rhythm: Normal rate and regular rhythm.      Heart sounds: No murmur heard.    No friction rub. No gallop.   Pulmonary:    "   Effort: Pulmonary effort is normal. No respiratory distress.      Breath sounds: Normal breath sounds. No wheezing or rales.   Chest:      Chest wall: No tenderness.   Abdominal:      General: Bowel sounds are normal. There is no distension.      Palpations: Abdomen is soft.      Tenderness: There is no abdominal tenderness.   Skin:     General: Skin is warm and dry.      Findings: No erythema.   Neurological:      Mental Status: She is alert and oriented to person, place, and time.   Psychiatric:         Mood and Affect: Mood and affect normal.         Cognition and Memory: Memory normal.         Judgment: Judgment normal.         Laboratory:    Reviewed labs and imaging.      Assessment Plan    1. Inattention    - Ambulatory referral/consult to Psychiatry; Future        Follow Up:  PRN w/PCP    The patient's diagnosis and medications were discussed.    I will review labs and notify patient with results either by mail or contact by phone.      05/18/2022  Mauricio Holt M.D.  [facility] PGY-3    *This note was dictated using the M*Modal Fluency Direct word recognition program. There are word recognition mistakes that are occasionally missed on review.

## 2022-06-24 ENCOUNTER — OFFICE VISIT (OUTPATIENT)
Dept: URGENT CARE | Facility: CLINIC | Age: 22
End: 2022-06-24
Payer: MEDICAID

## 2022-06-24 VITALS
HEIGHT: 66 IN | DIASTOLIC BLOOD PRESSURE: 81 MMHG | SYSTOLIC BLOOD PRESSURE: 119 MMHG | RESPIRATION RATE: 18 BRPM | HEART RATE: 77 BPM | TEMPERATURE: 98 F | BODY MASS INDEX: 29.73 KG/M2 | WEIGHT: 185 LBS | OXYGEN SATURATION: 98 %

## 2022-06-24 DIAGNOSIS — R52 GENERALIZED BODY ACHES: Primary | ICD-10-CM

## 2022-06-24 LAB
B-HCG UR QL: NEGATIVE
BILIRUB UR QL STRIP: NEGATIVE
CTP QC/QA: YES
GLUCOSE UR QL STRIP: NEGATIVE
KETONES UR QL STRIP: NEGATIVE
LEUKOCYTE ESTERASE UR QL STRIP: NEGATIVE
PH, POC UA: 5
POC BLOOD, URINE: POSITIVE
POC MOLECULAR INFLUENZA A AGN: NEGATIVE
POC MOLECULAR INFLUENZA B AGN: NEGATIVE
POC NITRATES, URINE: NEGATIVE
PROT UR QL STRIP: NEGATIVE
SARS-COV-2 RDRP RESP QL NAA+PROBE: NEGATIVE
SP GR UR STRIP: 1.02 (ref 1–1.03)
UROBILINOGEN UR STRIP-ACNC: ABNORMAL (ref 0.1–1.1)

## 2022-06-24 PROCEDURE — U0002: ICD-10-PCS | Mod: QW,S$GLB,, | Performed by: NURSE PRACTITIONER

## 2022-06-24 PROCEDURE — U0002 COVID-19 LAB TEST NON-CDC: HCPCS | Mod: QW,S$GLB,, | Performed by: NURSE PRACTITIONER

## 2022-06-24 PROCEDURE — 3079F DIAST BP 80-89 MM HG: CPT | Mod: CPTII,S$GLB,, | Performed by: NURSE PRACTITIONER

## 2022-06-24 PROCEDURE — 81025 POCT URINE PREGNANCY: ICD-10-PCS | Mod: S$GLB,,, | Performed by: NURSE PRACTITIONER

## 2022-06-24 PROCEDURE — 87502 POCT INFLUENZA A/B MOLECULAR: ICD-10-PCS | Mod: QW,S$GLB,, | Performed by: NURSE PRACTITIONER

## 2022-06-24 PROCEDURE — 3008F BODY MASS INDEX DOCD: CPT | Mod: CPTII,S$GLB,, | Performed by: NURSE PRACTITIONER

## 2022-06-24 PROCEDURE — 99213 PR OFFICE/OUTPT VISIT, EST, LEVL III, 20-29 MIN: ICD-10-PCS | Mod: S$GLB,,, | Performed by: NURSE PRACTITIONER

## 2022-06-24 PROCEDURE — 81003 URINALYSIS AUTO W/O SCOPE: CPT | Mod: QW,S$GLB,, | Performed by: NURSE PRACTITIONER

## 2022-06-24 PROCEDURE — 3074F SYST BP LT 130 MM HG: CPT | Mod: CPTII,S$GLB,, | Performed by: NURSE PRACTITIONER

## 2022-06-24 PROCEDURE — 3079F PR MOST RECENT DIASTOLIC BLOOD PRESSURE 80-89 MM HG: ICD-10-PCS | Mod: CPTII,S$GLB,, | Performed by: NURSE PRACTITIONER

## 2022-06-24 PROCEDURE — 1160F PR REVIEW ALL MEDS BY PRESCRIBER/CLIN PHARMACIST DOCUMENTED: ICD-10-PCS | Mod: CPTII,S$GLB,, | Performed by: NURSE PRACTITIONER

## 2022-06-24 PROCEDURE — 1159F MED LIST DOCD IN RCRD: CPT | Mod: CPTII,S$GLB,, | Performed by: NURSE PRACTITIONER

## 2022-06-24 PROCEDURE — 3008F PR BODY MASS INDEX (BMI) DOCUMENTED: ICD-10-PCS | Mod: CPTII,S$GLB,, | Performed by: NURSE PRACTITIONER

## 2022-06-24 PROCEDURE — 81003 POCT URINALYSIS, DIPSTICK, AUTOMATED, W/O SCOPE: ICD-10-PCS | Mod: QW,S$GLB,, | Performed by: NURSE PRACTITIONER

## 2022-06-24 PROCEDURE — 3074F PR MOST RECENT SYSTOLIC BLOOD PRESSURE < 130 MM HG: ICD-10-PCS | Mod: CPTII,S$GLB,, | Performed by: NURSE PRACTITIONER

## 2022-06-24 PROCEDURE — 87502 INFLUENZA DNA AMP PROBE: CPT | Mod: QW,S$GLB,, | Performed by: NURSE PRACTITIONER

## 2022-06-24 PROCEDURE — 1160F RVW MEDS BY RX/DR IN RCRD: CPT | Mod: CPTII,S$GLB,, | Performed by: NURSE PRACTITIONER

## 2022-06-24 PROCEDURE — 81025 URINE PREGNANCY TEST: CPT | Mod: S$GLB,,, | Performed by: NURSE PRACTITIONER

## 2022-06-24 PROCEDURE — 99213 OFFICE O/P EST LOW 20 MIN: CPT | Mod: S$GLB,,, | Performed by: NURSE PRACTITIONER

## 2022-06-24 PROCEDURE — 1159F PR MEDICATION LIST DOCUMENTED IN MEDICAL RECORD: ICD-10-PCS | Mod: CPTII,S$GLB,, | Performed by: NURSE PRACTITIONER

## 2022-06-24 NOTE — PROGRESS NOTES
"Subjective:       Patient ID: Rosita Dyson is a 22 y.o. female.    Vitals:  height is 5' 6" (1.676 m) and weight is 83.9 kg (185 lb). Her temperature is 98.3 °F (36.8 °C). Her blood pressure is 119/81 and her pulse is 77. Her respiration is 18 and oxygen saturation is 98%.     Chief Complaint: Headache (Body aches, sweats)    Pt explain that her symptoms began three days ago and she used otc meds to treat her symptosm with susana no relief.  Provider note below:  This is a 22 y.o. female who presents today with a chief complaint of chills, body aches and headache started 3 days ago, patient reports she is vaccinated for COVID, denies fever,  denies cough, wheezing or shortness of breath, denies nausea, vomiting, diarrhea or abdominal pain, denies urinary frequency, urgency, dysuria hematuria, denies back pain or flank pain, denies chest pain or dizziness positional lightheadedness, denies sore throat or trouble swallowing, denies loss of taste or smell, or any other symptoms      Headache   This is a new problem. The current episode started in the past 7 days. The problem occurs constantly. The problem has been unchanged. The pain is located in the retro-orbital region. The pain radiates to the upper back, lower back, left arm, left neck, left shoulder, right arm, right neck, right shoulder and face. The pain quality is not similar to prior headaches. The quality of the pain is described as aching. The pain is at a severity of 8/10. The pain is severe. Associated symptoms include facial sweating and muscle aches. Pertinent negatives include no abdominal pain, back pain, fever, nausea, sinus pressure, sore throat or vomiting. Nothing aggravates the symptoms. She has tried acetaminophen for the symptoms. The treatment provided no relief.       Constitution: Positive for chills. Negative for sweating, fatigue and fever.   HENT: Negative for postnasal drip, sinus pain, sinus pressure and sore throat.  "   Cardiovascular: Negative for chest pain.   Gastrointestinal: Negative for abdominal pain, nausea, vomiting, constipation and diarrhea.   Musculoskeletal: Positive for muscle ache. Negative for back pain.   Allergic/Immunologic: Negative for environmental allergies and seasonal allergies.   Neurological: Positive for headaches.       Objective:      Physical Exam   Constitutional: She is oriented to person, place, and time. She appears well-developed. She is cooperative.  Non-toxic appearance. She does not appear ill. No distress.   HENT:   Head: Normocephalic and atraumatic.   Ears:   Right Ear: Hearing, tympanic membrane, external ear and ear canal normal.   Left Ear: Hearing, tympanic membrane, external ear and ear canal normal.   Nose: Nose normal. No mucosal edema, rhinorrhea or nasal deformity. No epistaxis. Right sinus exhibits no maxillary sinus tenderness and no frontal sinus tenderness. Left sinus exhibits no maxillary sinus tenderness and no frontal sinus tenderness.   Mouth/Throat: Uvula is midline, oropharynx is clear and moist and mucous membranes are normal. No trismus in the jaw. Normal dentition. No uvula swelling. No oropharyngeal exudate, posterior oropharyngeal edema or posterior oropharyngeal erythema.   Eyes: Conjunctivae and lids are normal. No scleral icterus. Extraocular movement intact   Neck: Trachea normal and phonation normal. Neck supple. No edema present. No erythema present. No neck rigidity present.   Cardiovascular: Normal rate, regular rhythm, normal heart sounds and normal pulses.   Pulmonary/Chest: Effort normal and breath sounds normal. No stridor. No respiratory distress. She has no decreased breath sounds. She has no wheezes. She has no rhonchi. She has no rales.   Abdominal: Normal appearance.   Musculoskeletal: Normal range of motion.         General: No deformity. Normal range of motion.   Neurological: no focal deficit. She is alert and oriented to person, place, and  time. She exhibits normal muscle tone. Coordination normal.   Skin: Skin is warm, dry, intact, not diaphoretic and not pale.   Psychiatric: Her speech is normal and behavior is normal. Judgment and thought content normal.   Nursing note and vitals reviewed.        Results for orders placed or performed in visit on 06/24/22   POCT COVID-19 Rapid Screening   Result Value Ref Range    POC Rapid COVID Negative Negative     Acceptable Yes    POCT Influenza A/B MOLECULAR   Result Value Ref Range    POC Molecular Influenza A Ag Negative Negative, Not Reported    POC Molecular Influenza B Ag Negative Negative, Not Reported     Acceptable Yes    POCT Urinalysis, Dipstick, Automated, W/O Scope   Result Value Ref Range    POC Blood, Urine Positive (A) Negative    POC Bilirubin, Urine Negative Negative    POC Urobilinogen, Urine norm 0.1 - 1.1    POC Ketones, Urine Negative Negative    POC Protein, Urine Negative Negative    POC Nitrates, Urine Negative Negative    POC Glucose, Urine Negative Negative    pH, UA 5.0     POC Specific Gravity, Urine 1.025 1.003 - 1.029    POC Leukocytes, Urine Negative Negative   POCT urine pregnancy   Result Value Ref Range    POC Preg Test, Ur Negative Negative     Acceptable Yes          Patient in no acute distress.  Vitals reassuring.  Discussed results/diagnosis/plan in depth with patient in clinic. Strict precautions given to patient to monitor for worsening signs and symptoms. Advised to follow up with primary.All questions answered. Strict ER precautions given. If your symptoms worsens or fail to improve you should go to the Emergency Room. Discharge and follow-up instructions given verbally/printed. Discharge and follow-up instructions discussed with the patient who expressed understanding and willingness to comply with my recommendations.Patient voiced understanding and in agreement with current treatment plan.     Please be advised this text  was dictated with [email or web address] software and may contain errors due to translation.      Assessment:       1. Generalized body aches          Plan:         Generalized body aches  -     POCT COVID-19 Rapid Screening  -     POCT Influenza A/B MOLECULAR  -     POCT Urinalysis, Dipstick, Automated, W/O Scope  -     POCT urine pregnancy           Medical Decision Making:   Clinical Tests:   Lab Tests: Reviewed  Urgent Care Management:  Patient in no acute distress.  Vitals reassuring.  Patient is nontoxic appearing and afebrile.  Negative COVID-19 and flu test results discussed with patient in detail.  Urinalysis and U Pt as above.  Patient currently on menstrual cycle.  No UTI symptoms.  No back pain or flank pain.  Proper hydration advised.  I reiterated the importance of further evaluation if no improvement in symptoms.Patient voiced understanding and in agreement with current treatment plan.          Patient Instructions   PLEASE READ YOUR DISCHARGE INSTRUCTIONS ENTIRELY AS IT CONTAINS IMPORTANT INFORMATION.      Please drink plenty of fluids.    Please get plenty of rest.    Please return here or go to the Emergency Department for any concerns or worsening of condition.    Please take an over the counter antihistamine medication (allegra/Claritin/Zyrtec) of your choice as directed.    Try an over the counter decongestant like Mucinex D or Sudafed. You buy this behind the pharmacy counter    If you do have Hypertension or palpitations, it is safe to take Coricidin HBP for relief of sinus symptoms.    If not allergic, please take over the counter Tylenol (Acetaminophen) and/or Motrin (Ibuprofen) as directed for control of pain and/or fever.  Please follow up with your primary care doctor or specialist as needed.    Sore throat recommendations: Warm fluids, warm salt water gargles, throat lozenges, tea, honey, soup, rest, hydration.    Use over the counter flonase: one spray each nostril twice daily OR two sprays each  nostril once daily.     If you  smoke, please stop smoking.      Please return or see your primary care doctor if you develop new or worsening symptoms.     Please arrange follow up with your primary medical clinic as soon as possible. You must understand that you've received an Urgent Care treatment only and that you may be released before all of your medical problems are known or treated. You, the patient, will arrange for follow up as instructed. If your symptoms worsen or fail to improve you should go to the Emergency Room.

## 2022-07-13 ENCOUNTER — OFFICE VISIT (OUTPATIENT)
Dept: URGENT CARE | Facility: CLINIC | Age: 22
End: 2022-07-13
Payer: MEDICAID

## 2022-07-13 VITALS
DIASTOLIC BLOOD PRESSURE: 74 MMHG | HEIGHT: 66 IN | SYSTOLIC BLOOD PRESSURE: 106 MMHG | RESPIRATION RATE: 18 BRPM | WEIGHT: 185 LBS | OXYGEN SATURATION: 98 % | TEMPERATURE: 98 F | BODY MASS INDEX: 29.73 KG/M2 | HEART RATE: 86 BPM

## 2022-07-13 DIAGNOSIS — R05.9 COUGH: ICD-10-CM

## 2022-07-13 DIAGNOSIS — J06.9 UPPER RESPIRATORY INFECTION, VIRAL: Primary | ICD-10-CM

## 2022-07-13 LAB
CTP QC/QA: YES
SARS-COV-2 RDRP RESP QL NAA+PROBE: NEGATIVE

## 2022-07-13 PROCEDURE — 99213 OFFICE O/P EST LOW 20 MIN: CPT | Mod: S$GLB,,, | Performed by: NURSE PRACTITIONER

## 2022-07-13 PROCEDURE — 3078F PR MOST RECENT DIASTOLIC BLOOD PRESSURE < 80 MM HG: ICD-10-PCS | Mod: CPTII,S$GLB,, | Performed by: NURSE PRACTITIONER

## 2022-07-13 PROCEDURE — 3008F BODY MASS INDEX DOCD: CPT | Mod: CPTII,S$GLB,, | Performed by: NURSE PRACTITIONER

## 2022-07-13 PROCEDURE — U0002 COVID-19 LAB TEST NON-CDC: HCPCS | Mod: QW,S$GLB,, | Performed by: NURSE PRACTITIONER

## 2022-07-13 PROCEDURE — 99213 PR OFFICE/OUTPT VISIT, EST, LEVL III, 20-29 MIN: ICD-10-PCS | Mod: S$GLB,,, | Performed by: NURSE PRACTITIONER

## 2022-07-13 PROCEDURE — 1159F MED LIST DOCD IN RCRD: CPT | Mod: CPTII,S$GLB,, | Performed by: NURSE PRACTITIONER

## 2022-07-13 PROCEDURE — 3078F DIAST BP <80 MM HG: CPT | Mod: CPTII,S$GLB,, | Performed by: NURSE PRACTITIONER

## 2022-07-13 PROCEDURE — 3074F PR MOST RECENT SYSTOLIC BLOOD PRESSURE < 130 MM HG: ICD-10-PCS | Mod: CPTII,S$GLB,, | Performed by: NURSE PRACTITIONER

## 2022-07-13 PROCEDURE — 3008F PR BODY MASS INDEX (BMI) DOCUMENTED: ICD-10-PCS | Mod: CPTII,S$GLB,, | Performed by: NURSE PRACTITIONER

## 2022-07-13 PROCEDURE — 1159F PR MEDICATION LIST DOCUMENTED IN MEDICAL RECORD: ICD-10-PCS | Mod: CPTII,S$GLB,, | Performed by: NURSE PRACTITIONER

## 2022-07-13 PROCEDURE — U0002: ICD-10-PCS | Mod: QW,S$GLB,, | Performed by: NURSE PRACTITIONER

## 2022-07-13 PROCEDURE — 3074F SYST BP LT 130 MM HG: CPT | Mod: CPTII,S$GLB,, | Performed by: NURSE PRACTITIONER

## 2022-07-13 PROCEDURE — 1160F RVW MEDS BY RX/DR IN RCRD: CPT | Mod: CPTII,S$GLB,, | Performed by: NURSE PRACTITIONER

## 2022-07-13 PROCEDURE — 1160F PR REVIEW ALL MEDS BY PRESCRIBER/CLIN PHARMACIST DOCUMENTED: ICD-10-PCS | Mod: CPTII,S$GLB,, | Performed by: NURSE PRACTITIONER

## 2022-07-13 RX ORDER — BENZONATATE 100 MG/1
100 CAPSULE ORAL 3 TIMES DAILY PRN
Qty: 30 CAPSULE | Refills: 0 | Status: SHIPPED | OUTPATIENT
Start: 2022-07-13 | End: 2022-07-23

## 2022-07-13 RX ORDER — AZELASTINE 1 MG/ML
1 SPRAY, METERED NASAL 2 TIMES DAILY PRN
Qty: 30 ML | Refills: 0 | Status: SHIPPED | OUTPATIENT
Start: 2022-07-13 | End: 2022-12-13

## 2022-07-13 NOTE — PROGRESS NOTES
"Subjective:       Patient ID: Rosita Dyson is a 22 y.o. female.    Vitals:  height is 5' 6" (1.676 m) and weight is 83.9 kg (185 lb). Her temperature is 97.9 °F (36.6 °C). Her blood pressure is 106/74 and her pulse is 86. Her respiration is 18 and oxygen saturation is 98%.     Chief Complaint: URI (Sinuses, congestion, cough)    This is a 22 y.o. female who presents today with a chief complaint of sinus congestion, cough started on Monday, patient reports she is vaccinated, denies any known exposure,denies fever,  or chills, denies wheezing or shortness of breath, denies nausea, vomiting, diarrhea or abdominal pain, denies chest pain or dizziness positional lightheadedness, denies sore throat or trouble swallowing, denies loss of taste or smell, or any other symptoms      URI   This is a new problem. The current episode started in the past 7 days. The problem has been gradually worsening. Associated symptoms include congestion, coughing, headaches and sinus pain. Pertinent negatives include no abdominal pain, chest pain, diarrhea, nausea, sore throat, vomiting or wheezing. She has tried NSAIDs, acetaminophen and decongestant for the symptoms. The treatment provided no relief.       Constitution: Negative for chills, sweating and fatigue.   HENT: Positive for congestion and sinus pain. Negative for postnasal drip, sinus pressure and sore throat.    Cardiovascular: Negative for chest pain.   Respiratory: Positive for cough. Negative for sputum production, shortness of breath and wheezing.    Gastrointestinal: Negative for abdominal pain, nausea, vomiting, constipation and diarrhea.   Musculoskeletal: Positive for muscle ache.   Allergic/Immunologic: Negative for environmental allergies and seasonal allergies.   Neurological: Positive for headaches. Negative for dizziness and light-headedness.       Objective:      Physical Exam   Constitutional: She is oriented to person, place, and time. She appears " well-developed. She is cooperative.  Non-toxic appearance. She does not appear ill. No distress.   HENT:   Head: Normocephalic and atraumatic.   Ears:   Right Ear: Hearing, tympanic membrane, external ear and ear canal normal.   Left Ear: Hearing, tympanic membrane, external ear and ear canal normal.   Nose: Nose normal. No mucosal edema, rhinorrhea or nasal deformity. No epistaxis. Right sinus exhibits no maxillary sinus tenderness and no frontal sinus tenderness. Left sinus exhibits no maxillary sinus tenderness and no frontal sinus tenderness.   Mouth/Throat: Uvula is midline, oropharynx is clear and moist and mucous membranes are normal. No trismus in the jaw. Normal dentition. No uvula swelling. No oropharyngeal exudate, posterior oropharyngeal edema or posterior oropharyngeal erythema.   Eyes: Conjunctivae and lids are normal. No scleral icterus.   Neck: Trachea normal and phonation normal. Neck supple. No edema present. No erythema present. No neck rigidity present.   Cardiovascular: Normal rate, regular rhythm, normal heart sounds and normal pulses.   Pulmonary/Chest: Effort normal and breath sounds normal. No stridor. No respiratory distress. She has no decreased breath sounds. She has no wheezes. She has no rhonchi. She has no rales.   Abdominal: Normal appearance.   Musculoskeletal: Normal range of motion.         General: No deformity. Normal range of motion.   Neurological: She is alert and oriented to person, place, and time. She exhibits normal muscle tone. Coordination normal.   Skin: Skin is warm, dry, intact, not diaphoretic and not pale.   Psychiatric: Her speech is normal and behavior is normal. Judgment and thought content normal.   Nursing note and vitals reviewed.        Results for orders placed or performed in visit on 07/13/22   POCT COVID-19 Rapid Screening   Result Value Ref Range    POC Rapid COVID Negative Negative     Acceptable Yes          Patient in no acute distress.   Vitals reassuring.  Negative COVID-19 results discussed with patient in detail.  Detailed education provided about COVID 19 precautions and recommendations as per CDC guidelines.  Medication prescribed and over-the-counter medication discussed.  Discussed results/diagnosis/plan in depth with patient in clinic. Strict precautions given to patient to monitor for worsening signs and symptoms. Advised to follow up with primary.All questions answered. Strict ER precautions given. If your symptoms worsens or fail to improve you should go to the Emergency Room. Discharge and follow-up instructions given verbally/printed. Discharge and follow-up instructions discussed with the patient who expressed understanding and willingness to comply with my recommendations.Patient voiced understanding and in agreement with current treatment plan.     Please be advised this text was dictated with [employer] software and may contain errors due to translation.       Assessment:       1. Upper respiratory infection, viral    2. Cough          Plan:         Upper respiratory infection, viral  -     POCT COVID-19 Rapid Screening    Cough    Other orders  -     azelastine (ASTELIN) 137 mcg (0.1 %) nasal spray; 1 spray (137 mcg total) by Nasal route 2 (two) times daily as needed for Rhinitis.  Dispense: 30 mL; Refill: 0  -     benzonatate (TESSALON) 100 MG capsule; Take 1 capsule (100 mg total) by mouth 3 (three) times daily as needed for Cough.  Dispense: 30 capsule; Refill: 0           Medical Decision Making:   Differential Diagnosis:   Differential diagnoses included but not limited to acute URI, allergic or seasonal rhinitis, viral pharyngitis, bacterial pharyngitis or tonsillitis, tonsillitis, influenza, acute bacterial rhinosinusitis    Clinical Tests:   Lab Tests: Reviewed         Patient Instructions   PLEASE READ YOUR DISCHARGE INSTRUCTIONS ENTIRELY AS IT CONTAINS IMPORTANT INFORMATION.      Please drink plenty of fluids.    Please get  plenty of rest.    Please return here or go to the Emergency Department for any concerns or worsening of condition.    Please take an over the counter antihistamine medication (allegra/Claritin/Zyrtec) of your choice as directed.    Try an over the counter decongestant like Mucinex D or Sudafed. You buy this behind the pharmacy counter    If you do have Hypertension or palpitations, it is safe to take Coricidin HBP for relief of sinus symptoms.    If not allergic, please take over the counter Tylenol (Acetaminophen) and/or Motrin (Ibuprofen) as directed for control of pain and/or fever.  Please follow up with your primary care doctor or specialist as needed.    Sore throat recommendations: Warm fluids, warm salt water gargles, throat lozenges, tea, honey, soup, rest, hydration.    Use over the counter flonase: one spray each nostril twice daily OR two sprays each nostril once daily.     If you  smoke, please stop smoking.      Please return or see your primary care doctor if you develop new or worsening symptoms.     Please arrange follow up with your primary medical clinic as soon as possible. You must understand that you've received an Urgent Care treatment only and that you may be released before all of your medical problems are known or treated. You, the patient, will arrange for follow up as instructed. If your symptoms worsen or fail to improve you should go to the Emergency Room.

## 2022-07-13 NOTE — LETTER
[address]  [place] [address]  Phone: [phone]  Fax: [phone]          Return to Work/School    Patient: Rosita Dyson  YOB: 2000   Date: 07/13/2022     To Whom It May Concern:     Rosita Dyson was in contact with/seen in my office on 07/13/2022. COVID-19 is present in our communities across the [facility]. There is limited testing for COVID at this time, so not all patients can be tested. In this situation, your employee meets the following criteria:     Rosita Dyson has met the criteria for COVID-19 testing and has a NEGATIVE result. The employee can return to work once they are asymptomatic for 24 hours without the use of fever reducing medications (Tylenol, Motrin, etc).     If you have any questions or concerns, or if I can be of further assistance, please do not hesitate to contact me.     Sincerely,      Divya Huggins NP

## 2022-07-28 ENCOUNTER — OFFICE VISIT (OUTPATIENT)
Dept: FAMILY MEDICINE | Facility: HOSPITAL | Age: 22
End: 2022-07-28
Payer: MEDICAID

## 2022-07-28 VITALS
HEART RATE: 70 BPM | HEIGHT: 66 IN | WEIGHT: 196.88 LBS | DIASTOLIC BLOOD PRESSURE: 69 MMHG | BODY MASS INDEX: 31.64 KG/M2 | OXYGEN SATURATION: 100 % | SYSTOLIC BLOOD PRESSURE: 115 MMHG

## 2022-07-28 DIAGNOSIS — J02.8 SORE THROAT (VIRAL): ICD-10-CM

## 2022-07-28 DIAGNOSIS — J06.9 VIRAL URI WITH COUGH: Primary | ICD-10-CM

## 2022-07-28 DIAGNOSIS — B97.89 SORE THROAT (VIRAL): ICD-10-CM

## 2022-07-28 PROCEDURE — U0002 COVID-19 LAB TEST NON-CDC: HCPCS | Performed by: STUDENT IN AN ORGANIZED HEALTH CARE EDUCATION/TRAINING PROGRAM

## 2022-07-28 PROCEDURE — 99214 OFFICE O/P EST MOD 30 MIN: CPT | Performed by: STUDENT IN AN ORGANIZED HEALTH CARE EDUCATION/TRAINING PROGRAM

## 2022-07-28 NOTE — PROGRESS NOTES
"[facility]  History & Physical    SUBJECTIVE:     Chief Complaint:   Chief Complaint   Patient presents with    Nasal Congestion    Cough    Sore Throat    Otalgia         History of Present Illness:  22 y.o. female who  has a past medical history of Depression. presents to clinic today as an urgent visit for 2 day history of cough, sore throat, nasal congestion. Patient states cough is non-productive. Has been taking ibuprofen and hot baths for symptomatic relief with minimal improvement. Has history of tonsillectomy. Admits to "severe congestion". Denies fever, states the highest her temp has been is 98.7F at home. Denies any recent sick contacts. States she had "a few amoxicillin left over from a previous infection" which she took without improvement in symptoms leading to her scheduling this urgent visit.     Allergies:  Review of patient's allergies indicates:  No Known Allergies    Home Medications:  Current Outpatient Medications on File Prior to Visit   Medication Sig    azelastine (ASTELIN) 137 mcg (0.1 %) nasal spray 1 spray (137 mcg total) by Nasal route 2 (two) times daily as needed for Rhinitis. (Patient not taking: Reported on 7/28/2022)    norgestimate-ethinyl estradioL (ESTARYLLA) 0.25-35 mg-mcg per tablet Take 1 tablet by mouth once daily.     No current facility-administered medications on file prior to visit.       Past Medical History:   Diagnosis Date    Depression      Past Surgical History:   Procedure Laterality Date    CHOLECYSTECTOMY      TONSILLECTOMY      WISDOM TOOTH EXTRACTION Bilateral 2016     Social History     Tobacco Use    Smoking status: Current Every Day Smoker     Types: Vaping with nicotine    Smokeless tobacco: Never Used   Substance Use Topics    Alcohol use: Yes     Alcohol/week: 1.0 - 3.0 standard drink     Types: 1 - 3 Standard drinks or equivalent per week     Comment: socially    Drug use: No        Review of Systems   Constitutional: Positive for " malaise/fatigue. Negative for chills and fever.   HENT: Positive for congestion, sinus pain and sore throat. Negative for ear discharge and ear pain.    Eyes: Negative for discharge and redness.   Respiratory: Positive for cough and sputum production. Negative for hemoptysis, shortness of breath, wheezing and stridor.    Cardiovascular: Negative for chest pain and leg swelling.   Gastrointestinal: Negative for abdominal pain, constipation, diarrhea, nausea and vomiting.   Genitourinary: Negative for dysuria.   Musculoskeletal: Negative for myalgias.        OBJECTIVE:     Vital Signs:  Pulse: 70 (07/28/22 1508)  BP: 115/69 (07/28/22 1508)  SpO2: 100 % (07/28/22 1508)    Physical Exam  Constitutional:       General: She is not in acute distress.     Appearance: Normal appearance. She is ill-appearing. She is not toxic-appearing or diaphoretic.   HENT:      Head: Normocephalic and atraumatic.      Right Ear: External ear normal.      Left Ear: External ear normal.      Nose: Congestion present.      Mouth/Throat:      Mouth: Mucous membranes are moist.      Pharynx: No oropharyngeal exudate or posterior oropharyngeal erythema.      Comments: Absent tonsils s/p tonsillectomy   Eyes:      Extraocular Movements: Extraocular movements intact.   Cardiovascular:      Rate and Rhythm: Normal rate and regular rhythm.      Pulses: Normal pulses.      Heart sounds: Normal heart sounds.   Pulmonary:      Effort: Pulmonary effort is normal. No respiratory distress.      Breath sounds: Normal breath sounds.   Abdominal:      General: Abdomen is flat.      Tenderness: There is no guarding.   Musculoskeletal:         General: Normal range of motion.   Skin:     General: Skin is warm and dry.   Neurological:      Mental Status: She is alert and oriented to person, place, and time.   Psychiatric:         Mood and Affect: Mood normal.         Behavior: Behavior normal.       POCT COVID Test: Negative     A/P:  Rosita was seen today  for nasal congestion, cough, sore throat and otalgia.    Diagnoses and all orders for this visit:    Viral URI with cough  · Get plenty of rest.  · Increase fluids.   · May apply warm compresses as needed for facial pain and congestion.   · Saline nasal spray to loosen nasal congestion.  · Humidifier or steamy shower may help with congestion.  · Take Guaifenesin to help with congestion.  · Flonase to reduce inflammation in the sinus cavities.  · You may take an over the counter 24 hour antihistamine such as Zyrtec or Claritin for allergy symptoms such as sneezing, itchy/watery eyes, scratchy throat, or congestion.  · Warm salt water gargles, Cepacol throat lozenges, or Chloraseptic spray for sore throat.  · Take Tylenol or Ibuprofen as needed for sore throat, body aches, or fever.  · Take an over the counter cough suppressant such as Delsym or Robitussin DM as directed on label for cough.   · Avoid smoking   · Go to the ER for any fever that does not improve with Tylenol/Ibuprofen, neck stiffness, rash, severe headache, vision changes, shortness of breath, chest pain, facial swelling, severe facial pain, or any other new and concerning symptoms.       Patient afebrile, PE reassuring; Recommend supportive care and OTC options as listed above    Follow up if symptoms worsen or fail to improve, or as scheduled by regular PCP.    Suleman Pak DO  [facility], PGY-3  07/28/2022

## 2022-07-28 NOTE — LETTER
July 28, 2022    Rosita Dyson  [address]  [place] [address]         [facility]  [address], [address]  [place] [address]  Phone: [phone]  Fax: [phone] July 28, 2022     Patient: Rosita Dyson   YOB: 2000   Date of Visit: 7/28/2022       To Whom It May Concern:    It is my medical opinion that Rosita Dyson may return to work on 8/1/22.    If you have any questions or concerns, please don't hesitate to call.    Sincerely,        Suleman Pak, DO

## 2022-07-28 NOTE — PATIENT INSTRUCTIONS
Get plenty of rest.  Increase fluids.   May apply warm compresses as needed for facial pain and congestion.   Saline nasal spray to loosen nasal congestion.  Humidifier or steamy shower may help with congestion.  Take Guaifenesin to help with congestion.  Flonase to reduce inflammation in the sinus cavities.  You may take an over the counter 24 hour antihistamine such as Zyrtec or Claritin for allergy symptoms such as sneezing, itchy/watery eyes, scratchy throat, or congestion.  Warm salt water gargles, Cepacol throat lozenges, or Chloraseptic spray for sore throat.  Take Tylenol or Ibuprofen as needed for sore throat, body aches, or fever.  Take an over the counter cough suppressant such as Delsym or Robitussin DM as directed on label for cough.   Go to the ER for any fever that does not improve with Tylenol/Ibuprofen, neck stiffness, rash, severe headache, vision changes, shortness of breath, chest pain, facial swelling, severe facial pain, or any other new and concerning symptoms.

## 2022-07-29 LAB
CTP QC/QA: YES
SARS-COV-2 RDRP RESP QL NAA+PROBE: NEGATIVE

## 2022-07-29 NOTE — PROGRESS NOTES
I assume primary medical responsibility for this patient, I have reviewed the case history, findings, diagnosis and treatment plan with the resident and agree that the care is reasonable and necessary. This service has been performed by a resident without the presence of a teaching physician under the primary care exception.  See below addendum for my evaluation and additional findings.         Take the following medications the morning of surgery with a small sip of water: amlodipine  Pain med if needed, pepcid    Arrival time 10:30 am    General Instructions:  • Do not eat solid food after midnight the night before surgery.  • You may drink clear liquids day of surgery but must stop at least one hour before your hospital arrival time.  • It is beneficial for you to have a clear drink that contains carbohydrates the day of surgery.  We suggest a 12 to 20 ounce bottle of Gatorade or Powerade for non-diabetic patients or a 12 to 20 ounce bottle of G2 or Powerade Zero for diabetic patients. (Pediatric patients, are not advised to drink a 12 to 20 ounce carbohydrate drink)    Clear liquids are liquids you can see through.  Nothing red in color.     Plain water                               Sports drinks  Sodas                                   Gelatin (Jell-O)  Fruit juices without pulp such as white grape juice and apple juice  Popsicles that contain no fruit or yogurt  Tea or coffee (no cream or milk added)  Gatorade / Powerade  G2 / Powerade Zero    • Infants may have breast milk up to four hours before surgery.  • Infants drinking formula may drink formula up to six hours before surgery.   • Patients who avoid smoking, chewing tobacco and alcohol for 4 weeks prior to surgery have a reduced risk of post-operative complications.  Quit smoking as many days before surgery as you can.  • Do not smoke, use chewing tobacco or drink alcohol the day of surgery.   • If applicable bring your C-PAP/ BI-PAP machine.  • Bring any papers given to you in the doctor’s office.  • Wear clean comfortable clothes.  • Do not wear contact lenses, false eyelashes or make-up.  Bring a case for your glasses.   • Bring crutches or walker if applicable.  • Remove all piercings.  Leave jewelry and any other valuables at home.  • Hair extensions with metal clips must be removed prior to surgery.  • The Pre-Admission Testing nurse will  instruct you to bring medications if unable to obtain an accurate list in Pre-Admission Testing.        If you were given a blood bank ID arm band remember to bring it with you the day of surgery.    Preventing a Surgical Site Infection:  • For 2 to 3 days before surgery, avoid shaving with a razor because the razor can irritate skin and make it easier to develop an infection.    • Any areas of open skin can increase the risk of a post-operative wound infection by allowing bacteria to enter and travel throughout the body.  Notify your surgeon if you have any skin wounds / rashes even if it is not near the expected surgical site.  The area will need assessed to determine if surgery should be delayed until it is healed.  • The night prior to surgery sleep in a clean bed with clean clothing.  Do not allow pets to sleep with you.  • Shower on the morning of surgery using a fresh bar of anti-bacterial soap (such as Dial) and clean washcloth.  Dry with a clean towel and dress in clean clothing.  • Ask your surgeon if you will be receiving antibiotics prior to surgery.  • Make sure you, your family, and all healthcare providers clean their hands with soap and water or an alcohol based hand  before caring for you or your wound.    Day of surgery:  Your arrival time is approximately two hours before your scheduled surgery time.  Upon arrival, a Pre-op nurse and Anesthesiologist will review your health history, obtain vital signs, and answer questions you may have.  The only belongings needed at this time will be a list of your home medications and if applicable your C-PAP/BI-PAP machine.  If you are staying overnight your family can leave the rest of your belongings in the car and bring them to your room later.  A Pre-op nurse will start an IV and you may receive medication in preparation for surgery, including something to help you relax.  Your family will be able to see you in the Pre-op area.  Two visitors at a  time will be allowed in the Pre-op room.  While you are in surgery your family should notify the waiting room  if they leave the waiting room area and provide a contact phone number.    Please be aware that surgery does come with discomfort.  We want to make every effort to control your discomfort so please discuss any uncontrolled symptoms with your nurse.   Your doctor will most likely have prescribed pain medications.      If you are going home after surgery you will receive individualized written care instructions before being discharged.  A responsible adult must drive you to and from the hospital on the day of your surgery and stay with you for 24 hours.    If you are staying overnight following surgery, you will be transported to your hospital room following the recovery period.  [facility] has all private rooms.    You have received a list of surgical assistants for your reference.  If you have any questions please call Pre-Admission Testing at [phone].  Deductibles and co-payments are collected on the day of service. Please be prepared to pay the required co-pay, deductible or deposit on the day of service as defined by your plan.

## 2022-08-24 ENCOUNTER — NURSE TRIAGE (OUTPATIENT)
Dept: ADMINISTRATIVE | Facility: CLINIC | Age: 22
End: 2022-08-24
Payer: MEDICAID

## 2022-08-24 ENCOUNTER — HOSPITAL ENCOUNTER (EMERGENCY)
Facility: HOSPITAL | Age: 22
Discharge: HOME | End: 2022-08-24
Attending: EMERGENCY MEDICINE
Payer: MEDICAID

## 2022-08-24 VITALS
DIASTOLIC BLOOD PRESSURE: 68 MMHG | HEIGHT: 66 IN | RESPIRATION RATE: 16 BRPM | OXYGEN SATURATION: 95 % | HEART RATE: 95 BPM | TEMPERATURE: 99 F | SYSTOLIC BLOOD PRESSURE: 114 MMHG | WEIGHT: 185 LBS | BODY MASS INDEX: 29.73 KG/M2

## 2022-08-24 DIAGNOSIS — J45.20 MILD INTERMITTENT REACTIVE AIRWAY DISEASE WITHOUT COMPLICATION: Primary | ICD-10-CM

## 2022-08-24 DIAGNOSIS — R06.02 SOB (SHORTNESS OF BREATH): ICD-10-CM

## 2022-08-24 DIAGNOSIS — R07.9 CHEST PAIN: ICD-10-CM

## 2022-08-24 LAB
ALBUMIN SERPL BCP-MCNC: 4 G/DL (ref 3.5–5.2)
ALP SERPL-CCNC: 74 U/L (ref 55–135)
ALT SERPL W/O P-5'-P-CCNC: 12 U/L (ref 10–44)
ANION GAP SERPL CALC-SCNC: 10 MMOL/L (ref 8–16)
AST SERPL-CCNC: 21 U/L (ref 10–40)
B-HCG UR QL: NEGATIVE
BASOPHILS # BLD AUTO: 0.03 K/UL (ref 0–0.2)
BASOPHILS NFR BLD: 0.3 % (ref 0–1.9)
BILIRUB SERPL-MCNC: 0.9 MG/DL (ref 0.1–1)
BNP SERPL-MCNC: 43 PG/ML (ref 0–99)
BUN SERPL-MCNC: 6 MG/DL (ref 6–20)
CALCIUM SERPL-MCNC: 9.1 MG/DL (ref 8.7–10.5)
CHLORIDE SERPL-SCNC: 105 MMOL/L (ref 95–110)
CO2 SERPL-SCNC: 22 MMOL/L (ref 23–29)
CREAT SERPL-MCNC: 0.7 MG/DL (ref 0.5–1.4)
CTP QC/QA: YES
D DIMER PPP IA.FEU-MCNC: 5.49 MG/L FEU
DIFFERENTIAL METHOD: ABNORMAL
EOSINOPHIL # BLD AUTO: 0.4 K/UL (ref 0–0.5)
EOSINOPHIL NFR BLD: 3.5 % (ref 0–8)
ERYTHROCYTE [DISTWIDTH] IN BLOOD BY AUTOMATED COUNT: 12.7 % (ref 11.5–14.5)
EST. GFR  (NO RACE VARIABLE): >60 ML/MIN/1.73 M^2
GLUCOSE SERPL-MCNC: 88 MG/DL (ref 70–110)
HCT VFR BLD AUTO: 36.5 % (ref 37–48.5)
HGB BLD-MCNC: 12.1 G/DL (ref 12–16)
IMM GRANULOCYTES # BLD AUTO: 0.08 K/UL (ref 0–0.04)
IMM GRANULOCYTES NFR BLD AUTO: 0.8 % (ref 0–0.5)
LYMPHOCYTES # BLD AUTO: 1.7 K/UL (ref 1–4.8)
LYMPHOCYTES NFR BLD: 16 % (ref 18–48)
MCH RBC QN AUTO: 30.4 PG (ref 27–31)
MCHC RBC AUTO-ENTMCNC: 33.2 G/DL (ref 32–36)
MCV RBC AUTO: 92 FL (ref 82–98)
MONOCYTES # BLD AUTO: 0.7 K/UL (ref 0.3–1)
MONOCYTES NFR BLD: 6.5 % (ref 4–15)
NEUTROPHILS # BLD AUTO: 7.7 K/UL (ref 1.8–7.7)
NEUTROPHILS NFR BLD: 72.9 % (ref 38–73)
NRBC BLD-RTO: 0 /100 WBC
PLATELET # BLD AUTO: 196 K/UL (ref 150–450)
PMV BLD AUTO: 12.1 FL (ref 9.2–12.9)
POTASSIUM SERPL-SCNC: 3.6 MMOL/L (ref 3.5–5.1)
PROT SERPL-MCNC: 7.6 G/DL (ref 6–8.4)
RBC # BLD AUTO: 3.98 M/UL (ref 4–5.4)
SARS-COV-2 RDRP RESP QL NAA+PROBE: NEGATIVE
SODIUM SERPL-SCNC: 137 MMOL/L (ref 136–145)
TROPONIN I SERPL DL<=0.01 NG/ML-MCNC: 0.01 NG/ML (ref 0–0.03)
TSH SERPL DL<=0.005 MIU/L-ACNC: 0.56 UIU/ML (ref 0.4–4)
WBC # BLD AUTO: 10.49 K/UL (ref 3.9–12.7)

## 2022-08-24 PROCEDURE — 93010 EKG 12-LEAD: ICD-10-PCS | Mod: ,,, | Performed by: INTERNAL MEDICINE

## 2022-08-24 PROCEDURE — 94640 AIRWAY INHALATION TREATMENT: CPT

## 2022-08-24 PROCEDURE — 81025 URINE PREGNANCY TEST: CPT | Performed by: EMERGENCY MEDICINE

## 2022-08-24 PROCEDURE — 93005 ELECTROCARDIOGRAM TRACING: CPT

## 2022-08-24 PROCEDURE — 83880 ASSAY OF NATRIURETIC PEPTIDE: CPT | Performed by: EMERGENCY MEDICINE

## 2022-08-24 PROCEDURE — 99284 PR EMERGENCY DEPT VISIT,LEVEL IV: ICD-10-PCS | Mod: CS,,, | Performed by: EMERGENCY MEDICINE

## 2022-08-24 PROCEDURE — 84484 ASSAY OF TROPONIN QUANT: CPT | Performed by: EMERGENCY MEDICINE

## 2022-08-24 PROCEDURE — 85379 FIBRIN DEGRADATION QUANT: CPT | Performed by: EMERGENCY MEDICINE

## 2022-08-24 PROCEDURE — 25000242 PHARM REV CODE 250 ALT 637 W/ HCPCS: Performed by: EMERGENCY MEDICINE

## 2022-08-24 PROCEDURE — 84443 ASSAY THYROID STIM HORMONE: CPT | Performed by: EMERGENCY MEDICINE

## 2022-08-24 PROCEDURE — 86803 HEPATITIS C AB TEST: CPT | Performed by: PHYSICIAN ASSISTANT

## 2022-08-24 PROCEDURE — 87389 HIV-1 AG W/HIV-1&-2 AB AG IA: CPT | Performed by: PHYSICIAN ASSISTANT

## 2022-08-24 PROCEDURE — 25500020 PHARM REV CODE 255: Performed by: EMERGENCY MEDICINE

## 2022-08-24 PROCEDURE — 99284 EMERGENCY DEPT VISIT MOD MDM: CPT | Mod: CS,,, | Performed by: EMERGENCY MEDICINE

## 2022-08-24 PROCEDURE — U0002 COVID-19 LAB TEST NON-CDC: HCPCS | Performed by: EMERGENCY MEDICINE

## 2022-08-24 PROCEDURE — 85025 COMPLETE CBC W/AUTO DIFF WBC: CPT | Performed by: EMERGENCY MEDICINE

## 2022-08-24 PROCEDURE — 93010 ELECTROCARDIOGRAM REPORT: CPT | Mod: ,,, | Performed by: INTERNAL MEDICINE

## 2022-08-24 PROCEDURE — 63600175 PHARM REV CODE 636 W HCPCS: Performed by: EMERGENCY MEDICINE

## 2022-08-24 PROCEDURE — 99285 EMERGENCY DEPT VISIT HI MDM: CPT | Mod: 25

## 2022-08-24 PROCEDURE — 80053 COMPREHEN METABOLIC PANEL: CPT | Performed by: EMERGENCY MEDICINE

## 2022-08-24 RX ORDER — ALBUTEROL SULFATE 90 UG/1
1-2 AEROSOL, METERED RESPIRATORY (INHALATION) EVERY 4 HOURS PRN
Qty: 6.7 G | Refills: 0 | Status: SHIPPED | OUTPATIENT
Start: 2022-08-24 | End: 2023-08-24

## 2022-08-24 RX ORDER — IPRATROPIUM BROMIDE AND ALBUTEROL SULFATE 2.5; .5 MG/3ML; MG/3ML
3 SOLUTION RESPIRATORY (INHALATION)
Status: COMPLETED | OUTPATIENT
Start: 2022-08-24 | End: 2022-08-24

## 2022-08-24 RX ORDER — PREDNISONE 50 MG/1
50 TABLET ORAL DAILY
Qty: 3 TABLET | Refills: 0 | OUTPATIENT
Start: 2022-08-24 | End: 2022-08-24 | Stop reason: SDUPTHER

## 2022-08-24 RX ORDER — PREDNISONE 50 MG/1
50 TABLET ORAL DAILY
Qty: 3 TABLET | Refills: 0 | Status: SHIPPED | OUTPATIENT
Start: 2022-08-24 | End: 2022-08-27

## 2022-08-24 RX ORDER — ALBUTEROL SULFATE 90 UG/1
1-2 AEROSOL, METERED RESPIRATORY (INHALATION) EVERY 4 HOURS PRN
Qty: 6.7 G | Refills: 0 | OUTPATIENT
Start: 2022-08-24 | End: 2022-08-24 | Stop reason: SDUPTHER

## 2022-08-24 RX ORDER — PREDNISONE 20 MG/1
60 TABLET ORAL
Status: COMPLETED | OUTPATIENT
Start: 2022-08-24 | End: 2022-08-24

## 2022-08-24 RX ADMIN — IOHEXOL 100 ML: 350 INJECTION, SOLUTION INTRAVENOUS at 03:08

## 2022-08-24 RX ADMIN — IPRATROPIUM BROMIDE AND ALBUTEROL SULFATE 3 ML: .5; 3 SOLUTION RESPIRATORY (INHALATION) at 01:08

## 2022-08-24 RX ADMIN — PREDNISONE 60 MG: 20 TABLET ORAL at 01:08

## 2022-08-24 NOTE — ED NOTES
Reports SOB and CP since yesterday evening.  States she has been coughing since yesterday, denies fever/chills.  States she vomited once this morning and once last night. Denies abdominal pain or nausea.

## 2022-08-24 NOTE — ED PROVIDER NOTES
CC: Shortness of Breath (Pt c/o difficulty breathing, painful inspiration and coughing.)      History provided by:   Patient     HPI: Rosita Dyson is a 22 y.o. year old female PMH of depression who presents to the ED for cough, shortness of breath and chest pain with taking a deep breath in that started yesterday, no fever no runny nose, +sore throat from coughing,  No myalgia, no fever, no abdominal pain no nausea vomiting or diarrhea,  No recent travel no history of oral contraceptives or hormonal therapy, no history of DVT PE, denies being in proximity to sick contacts    Patient reports being vaccinated for COVID            Past Medical History:   Diagnosis Date    Depression      Past Surgical History:   Procedure Laterality Date    CHOLECYSTECTOMY      TONSILLECTOMY      WISDOM TOOTH EXTRACTION Bilateral 2016     Family History   Problem Relation Age of Onset    Hypertension Paternal Grandmother     No Known Problems Mother     No Known Problems Father     Amblyopia Neg Hx     Blindness Neg Hx     Cataracts Neg Hx     Glaucoma Neg Hx     Macular degeneration Neg Hx     Retinal detachment Neg Hx     Strabismus Neg Hx     Asthma Neg Hx     Birth defects Neg Hx     Cancer Neg Hx     Chromosomal disorder Neg Hx     Diabetes Neg Hx     Early death Neg Hx     Heart disease Neg Hx     Hyperlipidemia Neg Hx     Mental illness Neg Hx     Seizures Neg Hx     Thyroid disease Neg Hx     Other Neg Hx      No current facility-administered medications on file prior to encounter.     Current Outpatient Medications on File Prior to Encounter   Medication Sig Dispense Refill    azelastine (ASTELIN) 137 mcg (0.1 %) nasal spray 1 spray (137 mcg total) by Nasal route 2 (two) times daily as needed for Rhinitis. (Patient not taking: Reported on 7/28/2022) 30 mL 0    norgestimate-ethinyl estradioL (ESTARYLLA) 0.25-35 mg-mcg per tablet Take 1 tablet by mouth once daily. 90 tablet 0     Patient has no  known allergies.  Social History     Socioeconomic History    Marital status: Single   Tobacco Use    Smoking status: Current Every Day Smoker     Types: Vaping with nicotine    Smokeless tobacco: Never Used   Substance and Sexual Activity    Alcohol use: Yes     Alcohol/week: 1.0 - 3.0 standard drink     Types: 1 - 3 Standard drinks or equivalent per week     Comment: socially    Drug use: No    Sexual activity: Yes     Partners: Male     Birth control/protection: OCP, Condom       ROS:     Constitutional : neg for fever, neg for weakness  HENT neg for head injury, pos for sore throat  Eyes: neg for visual changes, neg for eye pain  Resp pos for SOB, pos for cough  Cardiac  pos for chest pain, neg for palpitations  GI neg for abd pain, neg for nausea, neg for vomiting   neg for urinary changes  Neuro neg for focal weakness or numbness  Skin neg for skin rash  MSK: neg for myalgia, neg for arthralgia  ALL: Patient has no known allergies.    PHYSICAL EXAM:  Vitals:    08/24/22 1629   BP: 114/68   Pulse: 95   Resp: 16   Temp: 99.1 °F (37.3 °C)     VS: triage VS reviewed    general: comfortable, in no acute distress, pleasant, well nourished  HENT: neck symmetric, trachea midline  Eyes: PERRL,no conjunctival injection and symmetrical eyelids  CV: RRR, no  murmurs, no rubs, no gallops, no LE edema  Resp: comfortable breathing, speaks in full sentences, bilateral expiratory wheezing  ABD:  soft, ND, no masses, + normal BS, NT  Renal: No CVAT  Neuro: AAO x 3, 5/5 strength x 4 extremities, sensation intact, face symmetric, speech normal  MSK: NC/AT, extremities w/out deformity   Skin: warm, dry            DATA & INTERVENTIONS:    LABS reviewed:  Labs Reviewed   COMPREHENSIVE METABOLIC PANEL - Abnormal; Notable for the following components:       Result Value    CO2 22 (*)     All other components within normal limits   CBC W/ AUTO DIFFERENTIAL - Abnormal; Notable for the following components:    RBC 3.98 (*)      Hematocrit 36.5 (*)     Immature Granulocytes 0.8 (*)     Immature Grans (Abs) 0.08 (*)     Lymph % 16.0 (*)     All other components within normal limits   D DIMER, QUANTITATIVE - Abnormal; Notable for the following components:    D-Dimer 5.49 (*)     All other components within normal limits   TROPONIN I   B-TYPE NATRIURETIC PEPTIDE   TSH   SARS-COV-2 RNA AMPLIFICATION, QUAL   HIV 1 / 2 ANTIBODY   HEPATITIS C ANTIBODY   POCT URINE PREGNANCY       RADIOLOGY reviewed:  Imaging Results          CTA Chest Non-Coronary (PE Study) (Final result)  Result time 08/24/22 16:20:18    Final result by Kali Arndt MD (08/24/22 16:20:18)                 Impression:      1. Technically limited study as above.  No acute cardio pulmonary process.  Specifically, no convincing evidence of pulmonary thromboembolism through the proximal segmental levels or pulmonary infarction.  Further evaluation/follow-up as warranted.  2. Small hiatal hernia.  3. Few additional findings as above.      Electronically signed by: Kali Arndt MD  Date:    08/24/2022  Time:    16:20             Narrative:    EXAMINATION:  CTA CHEST NON CORONARY    CLINICAL HISTORY:  Pulmonary embolism (PE) suspected, positive D-dimer;    TECHNIQUE:  Low dose axial images, sagittal and coronal reformations were obtained from the thoracic inlet to the lung bases following the IV administration of 100 mL of Omnipaque 350.  Contrast timing was optimized to evaluate the pulmonary arteries.  MIP images were performed.    COMPARISON:  Chest radiograph same day, CT abdomen and pelvis 12/08/2021    FINDINGS:  Beam hardening with streak artifact from contrast bolus somewhat limits evaluation.  There is suboptimal timing of contrast bolus in which the opacification is greater in the pulmonary venous and systemic circulations.    No saddle embolus or pulmonary infarction.  No convincing evidence of pulmonary thromboembolism through the proximal segmental levels.  Pulmonary  trunk is within normal limits.  Pulmonary arteries distribute normally.  There are 4 main pulmonary veins draining to the left atrium.    Left-sided arch with 4 vessel configuration.  No significant atherosclerosis of the aorta or its branch vessels.  No aortic aneurysm or dissection.    Heart is normal in size without significant pericardial fluid.  No cardiac thrombus seen.    Small hiatal hernia.  Esophagus appears normal in course and caliber.    No mediastinal, hilar or axillary lymphadenopathy.  Suspected triangular shaped residual thymus within the anterior mediastinum.    Trachea is midline.  Proximal airways are patent.  The lungs are well expanded and clear.  No pleural effusion or pneumothorax.    Structures at the base of the neck within normal limits.  No significant abnormality of the partially imaged extrathoracic soft tissues noting bilateral nipple metallic piercing jewelry with associated streak artifact.  Partially imaged upper abdomen shows evidence of prior cholecystectomy without acute process seen.  Osseous structures show minimal degenerative change and chronic appearing minimal to mild anterior wedge deformity of several mid to lower thoracic vertebral bodies with superimposed Schmorl's node deformities of several superior and inferior endplates.  No acute or destructive osseous process seen.                               X-Ray Chest AP Portable (Final result)  Result time 08/24/22 14:01:47    Final result by Jason Herbert MD (08/24/22 14:01:47)                 Impression:      No acute chest disease identified.      Electronically signed by: Jason Herbert MD  Date:    08/24/2022  Time:    14:01             Narrative:    EXAMINATION:  XR CHEST AP PORTABLE    CLINICAL HISTORY:  sob;    TECHNIQUE:  Single frontal view of the chest was performed.    COMPARISON:  None    FINDINGS:  The heart is not enlarged.  Superior mediastinal structures are unremarkable.  Pulmonary vasculature is within  normal limits.  The lungs are well aerated and free of focal consolidations.  There is no evidence for pneumothorax or large pleural effusions.                                MEDICATIONS/FLUIDS:  Medications   albuterol-ipratropium 2.5 mg-0.5 mg/3 mL nebulizer solution 3 mL (3 mLs Nebulization Given 8/24/22 1315)   predniSONE tablet 60 mg (60 mg Oral Given 8/24/22 1340)   sodium chloride 0.9% bolus 1,000 mL (1,000 mLs Intravenous Bolus from Bag 8/24/22 1410)   iohexoL (OMNIPAQUE 350) injection 100 mL (100 mLs Intravenous Given 8/24/22 1536)         MDM:  Rosita Dyson is a 22 y.o. year old female who presents to the ED for sore throat, cough, shortness of breath, chest pain with taking a deep breath in, patient with bilateral expiratory wheezing, no history of asthma    DDX includes but not limited to:  Viral syndrome with reactive airway disease, pneumonia, PE     Labs ordered and reviewed:   CMP no gross abnormalities  CBC normal white count platelets and hemoglobin  Point of care pregnancy neg  Troponin neg  BNP 43  D-dimer positive of 5.4  TSH within normal limits  covid neg  Medication given in the ED:  Albuterol, prednisone    CXR (ordered and reviewed):no acute  CTA chest  1. Technically limited study as above.  No acute cardio pulmonary process.  Specifically, no convincing evidence of pulmonary thromboembolism through the proximal segmental levels or pulmonary infarction.  Further evaluation/follow-up as warranted.   2. Small hiatal hernia.     EKG (independantly reviewed):  Sinus rhythm with incomplete right bundle-branch block, no previous EKG available for comparison    patient improved with albuterol, wheezing resolved. Results discussed w/ the patient. Discussed Rx with albuterol, 3 days of prednisone  The patient has been carefully educated about symptoms and conditions that should prompt immediate return to the ED for recheck or further evaluation. Told to return immediately for any new or  worsening or progressive symptoms. In addition, patient told to return to the ED if they are unable to obtain adequate o/p follow-up as they have been directed. Patient expressed good undertanding of these instructions.        IMPRESSION:  1.) Reactive airway disease      Dispo: Discharge    Critical Care Time: N/A       Carlee Cassidy MD  08/24/22 8834

## 2022-08-24 NOTE — TELEPHONE ENCOUNTER
Mrs. Dyson is calling to report that yesterday evening she began with a cough, and chest pain and pressure.  She is afebrile and has no other cold/flu like symptoms. She states when she tries to take a deep breath, she feels she is unable to do so, and it causes chest pain.  She also states there is constant chest pressure and sharp pain with each inspiration.  I advised ED now, with another adult to drive her there.  She verbalized understanding.  Reason for Disposition   SEVERE or constant chest pain or pressure  (Exception: Mild central chest pain, present only when coughing.)    Additional Information   Negative: SEVERE difficulty breathing (e.g., struggling for each breath, speaks in single words)   Negative: Difficult to awaken or acting confused (e.g., disoriented, slurred speech)   Negative: Bluish (or gray) lips or face now   Negative: Shock suspected (e.g., cold/pale/clammy skin, too weak to stand, low BP, rapid pulse)   Negative: Sounds like a life-threatening emergency to the triager    Protocols used: CORONAVIRUS (COVID-19) DIAGNOSED OR [id number]

## 2022-08-24 NOTE — PROVIDER PROGRESS NOTES - EMERGENCY DEPT.
Encounter Date: 8/24/2022    ED Physician Progress Notes         EKG - STEMI Decision  Initial Reading: No STEMI present.  Response: No Action Needed.

## 2022-08-25 LAB
HCV AB SERPL QL IA: NEGATIVE
HIV 1+2 AB+HIV1 P24 AG SERPL QL IA: NEGATIVE

## 2022-09-11 ENCOUNTER — OFFICE VISIT (OUTPATIENT)
Dept: URGENT CARE | Facility: CLINIC | Age: 22
End: 2022-09-11
Payer: MEDICAID

## 2022-09-11 VITALS
DIASTOLIC BLOOD PRESSURE: 84 MMHG | HEART RATE: 94 BPM | WEIGHT: 185 LBS | BODY MASS INDEX: 29.86 KG/M2 | TEMPERATURE: 98 F | SYSTOLIC BLOOD PRESSURE: 129 MMHG | RESPIRATION RATE: 20 BRPM | OXYGEN SATURATION: 97 %

## 2022-09-11 DIAGNOSIS — Z23 NEED FOR TDAP VACCINATION: ICD-10-CM

## 2022-09-11 DIAGNOSIS — S61.411A LACERATION OF RIGHT HAND WITHOUT FOREIGN BODY, INITIAL ENCOUNTER: Primary | ICD-10-CM

## 2022-09-11 PROCEDURE — 90715 TDAP VACCINE GREATER THAN OR EQUAL TO 7YO IM: ICD-10-PCS | Mod: S$GLB,,, | Performed by: NURSE PRACTITIONER

## 2022-09-11 PROCEDURE — 99214 PR OFFICE/OUTPT VISIT, EST, LEVL IV, 30-39 MIN: ICD-10-PCS | Mod: 25,S$GLB,, | Performed by: NURSE PRACTITIONER

## 2022-09-11 PROCEDURE — 1160F RVW MEDS BY RX/DR IN RCRD: CPT | Mod: CPTII,S$GLB,, | Performed by: NURSE PRACTITIONER

## 2022-09-11 PROCEDURE — 3079F DIAST BP 80-89 MM HG: CPT | Mod: CPTII,S$GLB,, | Performed by: NURSE PRACTITIONER

## 2022-09-11 PROCEDURE — 90715 TDAP VACCINE 7 YRS/> IM: CPT | Mod: S$GLB,,, | Performed by: NURSE PRACTITIONER

## 2022-09-11 PROCEDURE — 3008F BODY MASS INDEX DOCD: CPT | Mod: CPTII,S$GLB,, | Performed by: NURSE PRACTITIONER

## 2022-09-11 PROCEDURE — 3074F PR MOST RECENT SYSTOLIC BLOOD PRESSURE < 130 MM HG: ICD-10-PCS | Mod: CPTII,S$GLB,, | Performed by: NURSE PRACTITIONER

## 2022-09-11 PROCEDURE — 3079F PR MOST RECENT DIASTOLIC BLOOD PRESSURE 80-89 MM HG: ICD-10-PCS | Mod: CPTII,S$GLB,, | Performed by: NURSE PRACTITIONER

## 2022-09-11 PROCEDURE — 3008F PR BODY MASS INDEX (BMI) DOCUMENTED: ICD-10-PCS | Mod: CPTII,S$GLB,, | Performed by: NURSE PRACTITIONER

## 2022-09-11 PROCEDURE — 90471 IMMUNIZATION ADMIN: CPT | Mod: S$GLB,,, | Performed by: NURSE PRACTITIONER

## 2022-09-11 PROCEDURE — 1159F PR MEDICATION LIST DOCUMENTED IN MEDICAL RECORD: ICD-10-PCS | Mod: CPTII,S$GLB,, | Performed by: NURSE PRACTITIONER

## 2022-09-11 PROCEDURE — 1160F PR REVIEW ALL MEDS BY PRESCRIBER/CLIN PHARMACIST DOCUMENTED: ICD-10-PCS | Mod: CPTII,S$GLB,, | Performed by: NURSE PRACTITIONER

## 2022-09-11 PROCEDURE — 1159F MED LIST DOCD IN RCRD: CPT | Mod: CPTII,S$GLB,, | Performed by: NURSE PRACTITIONER

## 2022-09-11 PROCEDURE — 12042 INTMD RPR N-HF/GENIT2.6-7.5: CPT | Mod: S$GLB,,, | Performed by: NURSE PRACTITIONER

## 2022-09-11 PROCEDURE — 90471 TDAP VACCINE GREATER THAN OR EQUAL TO 7YO IM: ICD-10-PCS | Mod: S$GLB,,, | Performed by: NURSE PRACTITIONER

## 2022-09-11 PROCEDURE — 12042 LACERATION REPAIR: ICD-10-PCS | Mod: S$GLB,,, | Performed by: NURSE PRACTITIONER

## 2022-09-11 PROCEDURE — 99214 OFFICE O/P EST MOD 30 MIN: CPT | Mod: 25,S$GLB,, | Performed by: NURSE PRACTITIONER

## 2022-09-11 PROCEDURE — 3074F SYST BP LT 130 MM HG: CPT | Mod: CPTII,S$GLB,, | Performed by: NURSE PRACTITIONER

## 2022-09-11 RX ORDER — MUPIROCIN 20 MG/G
OINTMENT TOPICAL 2 TIMES DAILY
Qty: 30 G | Refills: 0 | Status: SHIPPED | OUTPATIENT
Start: 2022-09-11 | End: 2022-09-16

## 2022-09-11 NOTE — PROCEDURES
Laceration Repair    Date/Time: 2022 12:30 PM  Performed by: Divya Huggins NP  Authorized by: Divya Huggins NP   Consent Done: Yes  Consent: Verbal consent obtained.  Risks and benefits: risks, benefits and alternatives were discussed  Consent given by: patient  Patient identity confirmed:  and MRN  Body area: upper extremity  Location details: right hand  Laceration length: 3 cm  Foreign bodies: no foreign bodies  Tendon involvement: none  Nerve involvement: none  Vascular damage: no  Anesthesia: local infiltration    Anesthesia:  Local Anesthetic: lidocaine 1% without epinephrine  Irrigation solution: saline  Irrigation method: syringe  Amount of cleaning: extensive  Skin closure: Ethilon (4)  Number of sutures: 6  Technique: simple  Approximation: close  Approximation difficulty: simple  Dressing: non-stick sterile dressing  Patient tolerance: Patient tolerated the procedure well with no immediate complications

## 2022-09-11 NOTE — PATIENT INSTRUCTIONS
PLEASE READ YOUR DISCHARGE INSTRUCTIONS ENTIRELY AS IT CONTAINS IMPORTANT INFORMATION.    Use the antibiotic ointment for 5 days then stop and allow to scab over    Do not wet laceration for 12 hours.  Do not submerge laceration in water.  Gently clean wound with soap and water at least twice daily unless more frequently soiled then clean more frequently.  May apply Bactroban to wound to promote healing.  Must clean old antibiotic ointment away before new application.  DO NOT REMOVE SUTURES YOURSELF.  Return to clinic for suture removal as directed.   Signs of infection:  Increase in redness, increase in pain, purulent (pus) drainage. Contact clinic if infection concerns arise. Do not apply a great deal of tension or stress to the suture line.    Return in 7-10 days to have the sutures removed.     Please return or see your primary care doctor if you develop new or worsening symptoms (fever, spreading redness, pus drainage, severe pain or swelling).     Please arrange follow up with your primary medical clinic as soon as possible. You must understand that you've received an Urgent Care treatment only and that you may be released before all of your medical problems are known or treated. You, the patient, will arrange for follow up as instructed. If your symptoms worsen or fail to improve you should go to the Emergency Room.

## 2022-09-11 NOTE — PROGRESS NOTES
Subjective:       Patient ID: Rosita Dyson is a 22 y.o. female.    Vitals:  weight is 83.9 kg (185 lb). Her temperature is 98.3 °F (36.8 °C). Her blood pressure is 129/84 and her pulse is 94. Her respiration is 20 and oxygen saturation is 97%.     Chief Complaint: Laceration    Pt is complaining of cutting her right hand on glass last night. She said she is in 8/10 pain.   Provider note below:  This is a 22 y.o. female who presents today with a chief complaint of laceration to her right hand that happened last night at 2:00 a.m., patient reports she got laceration from the broken window glass, denies head trauma injury, patient reports her tetanus is not up-to-date, bleeding controlled, denies fever, body aches or chills, denies cough, wheezing or shortness of breath, denies nausea, vomiting, diarrhea or abdominal pain, denies chest pain or dizziness positional lightheadedness, denies sore throat or trouble swallowing, denies loss of taste or smell, or any other symptoms        Laceration   The incident occurred 12 to 24 hours ago. The laceration is located on the Right hand. The laceration mechanism was a broken glass. The pain is at a severity of 8/10. The pain is moderate.     Skin:  Positive for laceration.     Objective:      Physical Exam   Constitutional: She is oriented to person, place, and time. She appears well-developed. She is cooperative.  Non-toxic appearance. She does not appear ill. No distress.   HENT:   Head: Normocephalic and atraumatic. Head is without abrasion, without contusion and without laceration.   Ears:   Right Ear: Hearing, tympanic membrane, external ear and ear canal normal. No hemotympanum.   Left Ear: Hearing, tympanic membrane, external ear and ear canal normal. No hemotympanum.   Nose: Nose normal. No mucosal edema, rhinorrhea or nasal deformity. No epistaxis. Right sinus exhibits no maxillary sinus tenderness and no frontal sinus tenderness. Left sinus exhibits no maxillary  sinus tenderness and no frontal sinus tenderness.   Mouth/Throat: Uvula is midline, oropharynx is clear and moist and mucous membranes are normal. No trismus in the jaw. Normal dentition. No uvula swelling. No posterior oropharyngeal erythema.   Eyes: Conjunctivae, EOM and lids are normal. Pupils are equal, round, and reactive to light. Right eye exhibits no discharge. Left eye exhibits no discharge. No scleral icterus.   Neck: Trachea normal and phonation normal. Neck supple. No tracheal deviation present. No neck rigidity present. No spinous process tenderness present. No muscular tenderness present.   Cardiovascular: Normal rate, regular rhythm, normal heart sounds and normal pulses.   Pulmonary/Chest: Effort normal and breath sounds normal. No respiratory distress.   Abdominal: Normal appearance and bowel sounds are normal. She exhibits no distension and no mass. Soft. There is no abdominal tenderness.   Musculoskeletal: Normal range of motion.         General: No deformity. Normal range of motion.      Comments: Cap refill 2 seconds, right radial pulse 2+, sensation intact  3 cm laceration of right hand, bleeding controlled, no erythema or swelling noted see pic       Neurological: She is alert and oriented to person, place, and time. She has normal strength. No cranial nerve deficit or sensory deficit. She exhibits normal muscle tone. She displays no seizure activity. Coordination normal. GCS eye subscore is 4. GCS verbal subscore is 5. GCS motor subscore is 6.   Skin: Skin is warm, dry, intact, not diaphoretic and not pale. Capillary refill takes less than 2 seconds. No abrasion, No burn, No bruising and No ecchymosis   Psychiatric: Her speech is normal and behavior is normal. Judgment and thought content normal.   Nursing note and vitals reviewed.        Patient in no acute distress.  Vitals reassuring.  Discussed results/diagnosis/plan in depth with patient in clinic. Strict precautions given to patient to  monitor for worsening signs and symptoms. Advised to follow up with primary.All questions answered. Strict ER precautions given. If your symptoms worsens or fail to improve you should go to the Emergency Room. Discharge and follow-up instructions given verbally/printed. Discharge and follow-up instructions discussed with the patient who expressed understanding and willingness to comply with my recommendations.Patient voiced understanding and in agreement with current treatment plan.     Please be advised this text was dictated with [employer] software and may contain errors due to translation.            Patient in no acute distress.  Vitals reassuring.  Discussed results/diagnosis/plan in depth with patient in clinic. Strict precautions given to patient to monitor for worsening signs and symptoms. Advised to follow up with primary.All questions answered. Strict ER precautions given. If your symptoms worsens or fail to improve you should go to the Emergency Room. Discharge and follow-up instructions given verbally/printed. Discharge and follow-up instructions discussed with the patient who expressed understanding and willingness to comply with my recommendations.Patient voiced understanding and in agreement with current treatment plan.     Please be advised this text was dictated with [employer] software and may contain errors due to translation.    Assessment:       1. Laceration of right hand without foreign body, initial encounter    2. Need for Tdap vaccination          Plan:         Laceration of right hand without foreign body, initial encounter  -     Laceration Repair    Need for Tdap vaccination  -     (In Office Administered) Tdap Vaccine    Other orders  -     mupirocin (BACTROBAN) 2 % ointment; Apply topically 2 (two) times daily. for 5 days  Dispense: 30 g; Refill: 0         Medical Decision Making:   Urgent Care Management:  Patient in no acute distress.  Vitals reassuring.  On exam, patient is nontoxic  appearing and afebrile.  Physical examination consistent with 3 cm laceration to right hand.  Laceration repair with 6 sutures.  Tdap updated in clinic.  Red flags discussed with patient in detail.  Patient will return to clinic in 7-10 days for suture removal.Patient voiced understanding and in agreement with current treatment plan.         Patient Instructions   PLEASE READ YOUR DISCHARGE INSTRUCTIONS ENTIRELY AS IT CONTAINS IMPORTANT INFORMATION.    Use the antibiotic ointment for 5 days then stop and allow to scab over    Do not wet laceration for 12 hours.  Do not submerge laceration in water.  Gently clean wound with soap and water at least twice daily unless more frequently soiled then clean more frequently.  May apply Bactroban to wound to promote healing.  Must clean old antibiotic ointment away before new application.  DO NOT REMOVE SUTURES YOURSELF.  Return to clinic for suture removal as directed.   Signs of infection:  Increase in redness, increase in pain, purulent (pus) drainage. Contact clinic if infection concerns arise. Do not apply a great deal of tension or stress to the suture line.    Return in 7-10 days to have the sutures removed.     Please return or see your primary care doctor if you develop new or worsening symptoms (fever, spreading redness, pus drainage, severe pain or swelling).     Please arrange follow up with your primary medical clinic as soon as possible. You must understand that you've received an Urgent Care treatment only and that you may be released before all of your medical problems are known or treated. You, the patient, will arrange for follow up as instructed. If your symptoms worsen or fail to improve you should go to the Emergency Room.

## 2022-09-20 ENCOUNTER — CLINICAL SUPPORT (OUTPATIENT)
Dept: URGENT CARE | Facility: CLINIC | Age: 22
End: 2022-09-20
Payer: MEDICAID

## 2022-09-20 VITALS
HEIGHT: 66 IN | RESPIRATION RATE: 18 BRPM | WEIGHT: 185 LBS | BODY MASS INDEX: 29.73 KG/M2 | SYSTOLIC BLOOD PRESSURE: 125 MMHG | OXYGEN SATURATION: 99 % | TEMPERATURE: 98 F | DIASTOLIC BLOOD PRESSURE: 80 MMHG | HEART RATE: 81 BPM

## 2022-09-20 DIAGNOSIS — Z48.02 VISIT FOR SUTURE REMOVAL: Primary | ICD-10-CM

## 2022-09-20 PROCEDURE — 99499 UNLISTED E&M SERVICE: CPT | Mod: S$GLB,,, | Performed by: PHYSICIAN ASSISTANT

## 2022-09-20 PROCEDURE — 99024 POSTOP FOLLOW-UP VISIT: CPT | Mod: S$GLB,,, | Performed by: PHYSICIAN ASSISTANT

## 2022-09-20 PROCEDURE — 99499 NO LOS: ICD-10-PCS | Mod: S$GLB,,, | Performed by: PHYSICIAN ASSISTANT

## 2022-09-20 PROCEDURE — 99024 SUTURE REMOVAL: ICD-10-PCS | Mod: S$GLB,,, | Performed by: PHYSICIAN ASSISTANT

## 2022-09-20 NOTE — PATIENT INSTRUCTIONS
PLEASE READ YOUR DISCHARGE INSTRUCTIONS ENTIRELY AS IT CONTAINS IMPORTANT INFORMATION.  Please keep clean and dry until the scab resolves.  - Rest.    - Drink plenty of fluids.    - Tylenol or Ibuprofen as directed as needed for fever/pain.    - If you were prescribed antibiotics, please take them to completion.  - If you are female and on birth control pills - please use additional methods of contraception to prevent pregnancy while on antibiotics and for one cycle after.   - If you were prescribed a narcotic medication, a cough syrup, or a muscle relaxer, do not drive or operate heavy equipment or machinery while taking these medications, as they can cause drowsiness.   - If a referral to a specialty was made today, you should receive a phone call in the next few days to schedule an appt.  Please call [phone] to schedule an appt if have not gotten a phone call in the next few days.  - If you smoke, please stop smoking.  -You must understand that you've received an Urgent Care treatment only and that you may be released before all your medical problems are known or treated. You, the patient, will arrange for follow up care as instructed. Please arrange follow up with your primary medical clinic as soon as possible.   - Follow up with your PCP or specialty clinic as directed in the next 1-2 weeks if not improved or as needed.  You can call [phone] to schedule an appointment with the appropriate provider.    - Please return to Urgent Care or to the Emergency Department if your symptoms worsen.    Patient aware and verbalized understanding.

## 2022-09-20 NOTE — PROGRESS NOTES
"Subjective:       Patient ID: Rosita Dyson is a 22 y.o. female.    Vitals:  height is 5' 6" (1.676 m) and weight is 83.9 kg (185 lb). Her oral temperature is 98.2 °F (36.8 °C). Her blood pressure is 125/80 and her pulse is 81. Her respiration is 18 and oxygen saturation is 99%.     Chief Complaint: Suture / Staple Removal    Ms. Dyson presents for suture removal.  She had 6 sutures placed to her right hand on September 11th.  She reports everything is healing well and she is not having fevers, chills, drainage. the laceration is still slightly tender.  She has been keeping the laceration clean and dry.    Suture / Staple Removal  The sutures were placed 7 to 10 days ago. She tried nothing since the wound repair. The treatment provided moderate relief. There has been no drainage from the wound. The redness has improved. There is no swelling present. The pain has improved.     Constitution: Negative for appetite change, chills, sweating, fatigue and fever.   HENT:  Negative for ear pain, ear discharge, hearing loss, drooling, congestion, postnasal drip, sinus pain, sinus pressure and sore throat.    Neck: Negative for neck stiffness and painful lymph nodes.   Cardiovascular:  Negative for chest trauma, chest pain, leg swelling, palpitations, sob on exertion and passing out.   Eyes:  Negative for eye pain and blurred vision.   Respiratory:  Negative for chest tightness, cough, sputum production, shortness of breath and wheezing.    Gastrointestinal:  Negative for abdominal pain, nausea, vomiting and diarrhea.   Genitourinary:  Negative for dysuria, frequency and urgency.   Musculoskeletal:  Negative for joint pain, joint swelling, muscle cramps and muscle ache.   Skin:  Positive for laceration. Negative for rash and erythema.   Allergic/Immunologic: Negative for itching and sneezing.   Neurological:  Negative for dizziness, history of vertigo, light-headedness, passing out, facial drooping, speech difficulty, " coordination disturbances, loss of balance, headaches and altered mental status.   Hematologic/Lymphatic: Negative for swollen lymph nodes and easy bruising/bleeding. Does not bruise/bleed easily.   Psychiatric/Behavioral:  Negative for altered mental status.      Objective:      Physical Exam   Constitutional: She is oriented to person, place, and time. She appears well-developed.   HENT:   Head: Normocephalic and atraumatic. Head is without abrasion, without contusion and without laceration.   Ears:   Right Ear: External ear normal.   Left Ear: External ear normal.   Nose: Nose normal.   Mouth/Throat: Oropharynx is clear and moist and mucous membranes are normal.   Eyes: Conjunctivae, EOM and lids are normal. Pupils are equal, round, and reactive to light.   Neck: Trachea normal and phonation normal. Neck supple.   Cardiovascular: Normal rate, regular rhythm and normal heart sounds.   Pulmonary/Chest: Effort normal and breath sounds normal. No stridor. No respiratory distress.   Musculoskeletal: Normal range of motion.         General: Normal range of motion.        Hands:    Neurological: She is alert and oriented to person, place, and time.   Skin: Skin is warm, dry, intact and no rash. Capillary refill takes less than 2 seconds. No abrasion, No burn, No bruising, No erythema and No ecchymosis   Psychiatric: Her speech is normal and behavior is normal. Judgment and thought content normal.   Nursing note and vitals reviewed.      Assessment:       1. Visit for suture removal          Plan:         Visit for suture removal  -     Suture Removal    Diagnoses and plan discussed with the patient, as well as the expected course and duration of her symptoms. All questions and concerns were addressed prior to discharge.  She was advised to follow up with her PCP within 1 week if symptoms do not improve. Emergency department precautions were given. Patient verbalized understanding and was happy with the plan of care.    Note dictated with voice recognition software, please excuse any grammatical errors.    Patient Instructions   PLEASE READ YOUR DISCHARGE INSTRUCTIONS ENTIRELY AS IT CONTAINS IMPORTANT INFORMATION.  Please keep clean and dry until the scab resolves.  - Rest.    - Drink plenty of fluids.    - Tylenol or Ibuprofen as directed as needed for fever/pain.    - If you were prescribed antibiotics, please take them to completion.  - If you are female and on birth control pills - please use additional methods of contraception to prevent pregnancy while on antibiotics and for one cycle after.   - If you were prescribed a narcotic medication, a cough syrup, or a muscle relaxer, do not drive or operate heavy equipment or machinery while taking these medications, as they can cause drowsiness.   - If a referral to a specialty was made today, you should receive a phone call in the next few days to schedule an appt.  Please call [phone] to schedule an appt if have not gotten a phone call in the next few days.  - If you smoke, please stop smoking.  -You must understand that you've received an Urgent Care treatment only and that you may be released before all your medical problems are known or treated. You, the patient, will arrange for follow up care as instructed. Please arrange follow up with your primary medical clinic as soon as possible.   - Follow up with your PCP or specialty clinic as directed in the next 1-2 weeks if not improved or as needed.  You can call [phone] to schedule an appointment with the appropriate provider.    - Please return to Urgent Care or to the Emergency Department if your symptoms worsen.    Patient aware and verbalized understanding.

## 2022-09-20 NOTE — PROCEDURES
Suture Removal    Date/Time: 9/20/2022 4:45 PM  Location procedure was performed: [facility]  Performed by: Yancy Duffy PA-C  Authorized by: Yancy Duffy PA-C   Pre-operative diagnosis: laceration  Post-operative diagnosis: laceration  Body area: upper extremity  Location details: right hand  Wound Appearance: clean, well healed, normal color, tender, no drainage and nonpurulent  Sutures Removed: 6  Post-removal: bandaid applied  Facility: sutures placed in this facility  Complications: No  Estimated blood loss (mL): 0  Specimens: No  Implants: No  Patient tolerance: Patient tolerated the procedure well with no immediate complications

## 2022-12-03 ENCOUNTER — HOSPITAL ENCOUNTER (EMERGENCY)
Facility: HOSPITAL | Age: 22
Discharge: HOME | End: 2022-12-03
Attending: EMERGENCY MEDICINE
Payer: MEDICAID

## 2022-12-03 VITALS
OXYGEN SATURATION: 98 % | HEART RATE: 81 BPM | DIASTOLIC BLOOD PRESSURE: 76 MMHG | RESPIRATION RATE: 20 BRPM | SYSTOLIC BLOOD PRESSURE: 125 MMHG | TEMPERATURE: 99 F

## 2022-12-03 DIAGNOSIS — N93.9 VAGINAL BLEEDING: Primary | ICD-10-CM

## 2022-12-03 LAB
B-HCG UR QL: NEGATIVE
BASOPHILS # BLD AUTO: 0.03 K/UL (ref 0–0.2)
BASOPHILS NFR BLD: 0.4 % (ref 0–1.9)
BILIRUB UR QL STRIP: NEGATIVE
CLARITY UR: CLEAR
COLOR UR: YELLOW
CTP QC/QA: YES
DIFFERENTIAL METHOD: ABNORMAL
EOSINOPHIL # BLD AUTO: 0.1 K/UL (ref 0–0.5)
EOSINOPHIL NFR BLD: 1 % (ref 0–8)
ERYTHROCYTE [DISTWIDTH] IN BLOOD BY AUTOMATED COUNT: 13.1 % (ref 11.5–14.5)
GLUCOSE UR QL STRIP: NEGATIVE
HCT VFR BLD AUTO: 36 % (ref 37–48.5)
HGB BLD-MCNC: 11.9 G/DL (ref 12–16)
HGB UR QL STRIP: ABNORMAL
IMM GRANULOCYTES # BLD AUTO: 0.02 K/UL (ref 0–0.04)
IMM GRANULOCYTES NFR BLD AUTO: 0.3 % (ref 0–0.5)
KETONES UR QL STRIP: NEGATIVE
LEUKOCYTE ESTERASE UR QL STRIP: NEGATIVE
LYMPHOCYTES # BLD AUTO: 2.7 K/UL (ref 1–4.8)
LYMPHOCYTES NFR BLD: 34.1 % (ref 18–48)
MCH RBC QN AUTO: 30.9 PG (ref 27–31)
MCHC RBC AUTO-ENTMCNC: 33.1 G/DL (ref 32–36)
MCV RBC AUTO: 94 FL (ref 82–98)
MICROSCOPIC COMMENT: ABNORMAL
MONOCYTES # BLD AUTO: 0.5 K/UL (ref 0.3–1)
MONOCYTES NFR BLD: 6.1 % (ref 4–15)
NEUTROPHILS # BLD AUTO: 4.6 K/UL (ref 1.8–7.7)
NEUTROPHILS NFR BLD: 58.1 % (ref 38–73)
NITRITE UR QL STRIP: NEGATIVE
NRBC BLD-RTO: 0 /100 WBC
PH UR STRIP: >8 [PH] (ref 5–8)
PLATELET # BLD AUTO: 222 K/UL (ref 150–450)
PMV BLD AUTO: 12 FL (ref 9.2–12.9)
PROT UR QL STRIP: ABNORMAL
RBC # BLD AUTO: 3.85 M/UL (ref 4–5.4)
RBC #/AREA URNS HPF: 7 /HPF (ref 0–4)
SP GR UR STRIP: 1.02 (ref 1–1.03)
SQUAMOUS #/AREA URNS HPF: 0 /HPF
URN SPEC COLLECT METH UR: ABNORMAL
UROBILINOGEN UR STRIP-ACNC: NEGATIVE EU/DL
WBC # BLD AUTO: 7.91 K/UL (ref 3.9–12.7)
WBC #/AREA URNS HPF: 3 /HPF (ref 0–5)

## 2022-12-03 PROCEDURE — 81025 URINE PREGNANCY TEST: CPT | Performed by: EMERGENCY MEDICINE

## 2022-12-03 PROCEDURE — 85025 COMPLETE CBC W/AUTO DIFF WBC: CPT | Performed by: NURSE PRACTITIONER

## 2022-12-03 PROCEDURE — 99284 EMERGENCY DEPT VISIT MOD MDM: CPT | Mod: 25

## 2022-12-03 PROCEDURE — 25000003 PHARM REV CODE 250: Performed by: NURSE PRACTITIONER

## 2022-12-03 PROCEDURE — 81000 URINALYSIS NONAUTO W/SCOPE: CPT | Performed by: EMERGENCY MEDICINE

## 2022-12-03 RX ORDER — NAPROXEN 500 MG/1
500 TABLET ORAL
Status: COMPLETED | OUTPATIENT
Start: 2022-12-03 | End: 2022-12-03

## 2022-12-03 RX ADMIN — NAPROXEN 500 MG: 500 TABLET ORAL at 05:12

## 2022-12-03 NOTE — ED PROVIDER NOTES
Encounter Date: 12/3/2022    SCRIBE #1 NOTE: I, Simon Negron am scribing for, and in the presence of,  Kiana Medrano NP. I have scribed the following portions of the note - Other sections scribed: HPI, CRYSTAL.     History     Chief Complaint   Patient presents with    Vaginal Bleeding     LMP was 11/19 to 11/24. Vaginal bleeding started yesterday am upon awakening. Pt is now c/o and cramping with large clots  and lower back pain; 7/10 pain.      22 year old female presents to ED with complaint of vaginal bleeding, onset 1 day ago. She reports she is bleeding through tampons and passing blood clots. She has associated symptoms of abdominal cramps, lightheadedness and back pain. Patient reports her last menstrual cycle was 11/19 to 11/24. Patient's last dose of birth control was approximately 1 year ago. She denies history of Fibroids or recent pregnancies. No other complaints at this time.       Review of patient's allergies indicates:  No Known Allergies  Past Medical History:   Diagnosis Date    Depression      Past Surgical History:   Procedure Laterality Date    CHOLECYSTECTOMY      TONSILLECTOMY      WISDOM TOOTH EXTRACTION Bilateral 2016     Family History   Problem Relation Age of Onset    Hypertension Paternal Grandmother     No Known Problems Mother     No Known Problems Father     Amblyopia Neg Hx     Blindness Neg Hx     Cataracts Neg Hx     Glaucoma Neg Hx     Macular degeneration Neg Hx     Retinal detachment Neg Hx     Strabismus Neg Hx     Asthma Neg Hx     Birth defects Neg Hx     Cancer Neg Hx     Chromosomal disorder Neg Hx     Diabetes Neg Hx     Early death Neg Hx     Heart disease Neg Hx     Hyperlipidemia Neg Hx     Mental illness Neg Hx     Seizures Neg Hx     Thyroid disease Neg Hx     Other Neg Hx      Social History     Tobacco Use    Smoking status: Every Day     Types: Vaping with nicotine    Smokeless tobacco: Never   Substance Use Topics    Alcohol use: Yes     Alcohol/week: 1.0 - 3.0  standard drink     Types: 1 - 3 Standard drinks or equivalent per week     Comment: socially    Drug use: No     Review of Systems   Constitutional:  Negative for chills and fever.   HENT:  Negative for sore throat.    Respiratory:  Negative for cough, chest tightness, shortness of breath and stridor.    Cardiovascular:  Negative for chest pain.   Gastrointestinal:  Positive for abdominal pain (cramping). Negative for nausea and vomiting.   Genitourinary:  Positive for vaginal bleeding. Negative for dysuria.   Musculoskeletal:  Positive for back pain. Negative for myalgias.   Skin:  Negative for rash.   Neurological:  Positive for light-headedness. Negative for weakness.   Hematological:  Does not bruise/bleed easily.   All other systems reviewed and are negative.    Physical Exam     Initial Vitals [12/03/22 1633]   BP Pulse Resp Temp SpO2   125/76 81 20 98.5 °F (36.9 °C) 98 %      MAP       --         Physical Exam    Nursing note and vitals reviewed.  Constitutional: She appears well-developed and well-nourished.   HENT:   Head: Normocephalic and atraumatic.   Eyes: Conjunctivae and EOM are normal. Pupils are equal, round, and reactive to light.   Neck:   Normal range of motion.  Cardiovascular:  Normal rate, regular rhythm, normal heart sounds and intact distal pulses.     Exam reveals no gallop and no friction rub.       No murmur heard.  Pulmonary/Chest: Breath sounds normal. No respiratory distress. She has no wheezes. She has no rhonchi. She has no rales. She exhibits no tenderness.   Abdominal: Abdomen is soft. Bowel sounds are normal. She exhibits no distension and no mass. There is abdominal tenderness in the suprapubic area. There is no rebound and no guarding.   Musculoskeletal:         General: No tenderness or edema. Normal range of motion.      Cervical back: Normal range of motion.     Neurological: She is alert and oriented to person, place, and time. She has normal strength. GCS score is 15. GCS  eye subscore is 4. GCS verbal subscore is 5. GCS motor subscore is 6.   Skin: Skin is warm.       ED Course   Procedures  Labs Reviewed   CBC W/ AUTO DIFFERENTIAL - Abnormal; Notable for the following components:       Result Value    RBC 3.85 (*)     Hemoglobin 11.9 (*)     Hematocrit 36.0 (*)     All other components within normal limits   URINALYSIS, REFLEX TO URINE CULTURE - Abnormal; Notable for the following components:    pH, UA >8.0 (*)     Protein, UA Trace (*)     Occult Blood UA 2+ (*)     All other components within normal limits    Narrative:     Specimen Source->Urine   URINALYSIS MICROSCOPIC - Abnormal; Notable for the following components:    RBC, UA 7 (*)     All other components within normal limits    Narrative:     Specimen Source->Urine   POCT URINE PREGNANCY          Imaging Results              US Pelvis Comp with Transvag NON-OB (xpd (Final result)  Result time 12/03/22 18:23:09      Final result by Arthur Spears DO (12/03/22 18:23:09)                   Impression:      Unremarkable pelvic ultrasound.      Electronically signed by: Arthur Spears  Date:    12/03/2022  Time:    18:23               Narrative:    EXAMINATION:  US PELVIS COMP WITH TRANSVAG NON-OB (XPD)    CLINICAL HISTORY:  heavy vaginal bleeding;    TECHNIQUE:  Transabdominal sonography of the pelvis was performed, followed by transvaginal sonography to better evaluate the uterus and ovaries.    COMPARISON:  None.    FINDINGS:  Uterus:    Size: 7.9 x 3.6 x 4.7 cm    Masses: None    Endometrium: Normal in this pre menopausal patient, measuring 4 mm.    Right ovary:    Size: 3.4 x 1.7 x 2.6 cm    Appearance: Normal    Vascular flow: Normal.    Left ovary:    Size: 3.2 x 2.9 x 1.7 cm    Appearance: Normal.  There is a dominant follicle measuring 1.5 x 1.2 x 1.3 cm.    Vascular Flow: Normal.    Free Fluid:    None.                                       Medications   naproxen tablet 500 mg (500 mg Oral Given 12/3/22 0252)      Medical Decision Making:   Initial Assessment:   21 y/o female which presents to the ED with vaginal bleeding. LMP 11/19. Labs and US pending.  Differential Diagnosis:   Fibroids, dysmenorrhea, ectopic, miscarriage  Clinical Tests:   Lab Tests: Ordered and Reviewed  The following lab test(s) were unremarkable: UPT and Urinalysis  Radiological Study: Ordered and Reviewed  ED Management:  Pt examined and has suprapubic pain consistent with her uterine cramping. US is negative. Urinalysis WNL (RBC due to vaginal bleeding). Pt advised to follow up with her gynecologist. NO signs of anemia on lab work. Patient given strict return precautions and voiced understanding of all discharge instructions. Pt was stable at discharge.               Scribe Attestation:   Scribe #1: I performed the above scribed service and the documentation accurately describes the services I performed. I attest to the accuracy of the note.      ED Course as of 12/04/22 1124   Sat Dec 03, 2022   1654 BP: 125/76 [AT]   1655 Temp: 98.5 °F (36.9 °C) [AT]   1655 Temp src: Oral [AT]   1655 Pulse: 81 [AT]   1655 Resp: 20 [AT]   1655 SpO2: 98 % [AT]   1656 Preg Test, Ur: Negative [AT]      ED Course User Index  [AT] NATE Andrade                 Clinical Impression:   Final diagnoses:  [N93.9] Vaginal bleeding (Primary)      ED Disposition Condition    Discharge Stable        This document was produced by a scribe under my direction and in my presence. I agree with the content of the note and have made any necessary edits.     Kiana Medrano DNP, NATE-BC    ED Prescriptions    None       Follow-up Information       Follow up With Specialties Details Why Contact Info    Call gynecologist on Monday                 NATE Andrade  12/04/22 1124

## 2022-12-04 NOTE — ED NOTES
Called lab and they said they never received urine from this patient.  Pt has fresh urine and I sent it to lab with lab slip via tube station in ED

## 2022-12-13 ENCOUNTER — LAB VISIT (OUTPATIENT)
Dept: LAB | Facility: HOSPITAL | Age: 22
End: 2022-12-13
Attending: NURSE PRACTITIONER
Payer: MEDICAID

## 2022-12-13 ENCOUNTER — OFFICE VISIT (OUTPATIENT)
Dept: OBSTETRICS AND GYNECOLOGY | Facility: CLINIC | Age: 22
End: 2022-12-13
Payer: MEDICAID

## 2022-12-13 VITALS — BODY MASS INDEX: 31.7 KG/M2 | SYSTOLIC BLOOD PRESSURE: 120 MMHG | DIASTOLIC BLOOD PRESSURE: 72 MMHG | WEIGHT: 196.44 LBS

## 2022-12-13 DIAGNOSIS — N93.9 ABNORMAL VAGINAL BLEEDING: ICD-10-CM

## 2022-12-13 DIAGNOSIS — N93.9 ABNORMAL VAGINAL BLEEDING: Primary | ICD-10-CM

## 2022-12-13 LAB — TSH SERPL DL<=0.005 MIU/L-ACNC: 0.85 UIU/ML (ref 0.4–4)

## 2022-12-13 PROCEDURE — 36415 COLL VENOUS BLD VENIPUNCTURE: CPT | Performed by: NURSE PRACTITIONER

## 2022-12-13 PROCEDURE — 99212 OFFICE O/P EST SF 10 MIN: CPT | Mod: PBBFAC,PO | Performed by: NURSE PRACTITIONER

## 2022-12-13 PROCEDURE — 1160F PR REVIEW ALL MEDS BY PRESCRIBER/CLIN PHARMACIST DOCUMENTED: ICD-10-PCS | Mod: CPTII,,, | Performed by: NURSE PRACTITIONER

## 2022-12-13 PROCEDURE — 1160F RVW MEDS BY RX/DR IN RCRD: CPT | Mod: CPTII,,, | Performed by: NURSE PRACTITIONER

## 2022-12-13 PROCEDURE — 3078F DIAST BP <80 MM HG: CPT | Mod: CPTII,,, | Performed by: NURSE PRACTITIONER

## 2022-12-13 PROCEDURE — 3074F SYST BP LT 130 MM HG: CPT | Mod: CPTII,,, | Performed by: NURSE PRACTITIONER

## 2022-12-13 PROCEDURE — 3078F PR MOST RECENT DIASTOLIC BLOOD PRESSURE < 80 MM HG: ICD-10-PCS | Mod: CPTII,,, | Performed by: NURSE PRACTITIONER

## 2022-12-13 PROCEDURE — 99213 PR OFFICE/OUTPT VISIT, EST, LEVL III, 20-29 MIN: ICD-10-PCS | Mod: S$PBB,,, | Performed by: NURSE PRACTITIONER

## 2022-12-13 PROCEDURE — 3074F PR MOST RECENT SYSTOLIC BLOOD PRESSURE < 130 MM HG: ICD-10-PCS | Mod: CPTII,,, | Performed by: NURSE PRACTITIONER

## 2022-12-13 PROCEDURE — 3008F BODY MASS INDEX DOCD: CPT | Mod: CPTII,,, | Performed by: NURSE PRACTITIONER

## 2022-12-13 PROCEDURE — 87491 CHLMYD TRACH DNA AMP PROBE: CPT | Performed by: NURSE PRACTITIONER

## 2022-12-13 PROCEDURE — 99999 PR PBB SHADOW E&M-EST. PATIENT-LVL II: CPT | Mod: PBBFAC,,, | Performed by: NURSE PRACTITIONER

## 2022-12-13 PROCEDURE — 3008F PR BODY MASS INDEX (BMI) DOCUMENTED: ICD-10-PCS | Mod: CPTII,,, | Performed by: NURSE PRACTITIONER

## 2022-12-13 PROCEDURE — 87591 N.GONORRHOEAE DNA AMP PROB: CPT | Performed by: NURSE PRACTITIONER

## 2022-12-13 PROCEDURE — 1159F PR MEDICATION LIST DOCUMENTED IN MEDICAL RECORD: ICD-10-PCS | Mod: CPTII,,, | Performed by: NURSE PRACTITIONER

## 2022-12-13 PROCEDURE — 99999 PR PBB SHADOW E&M-EST. PATIENT-LVL II: ICD-10-PCS | Mod: PBBFAC,,, | Performed by: NURSE PRACTITIONER

## 2022-12-13 PROCEDURE — 1159F MED LIST DOCD IN RCRD: CPT | Mod: CPTII,,, | Performed by: NURSE PRACTITIONER

## 2022-12-13 PROCEDURE — 84443 ASSAY THYROID STIM HORMONE: CPT | Performed by: NURSE PRACTITIONER

## 2022-12-13 PROCEDURE — 99213 OFFICE O/P EST LOW 20 MIN: CPT | Mod: S$PBB,,, | Performed by: NURSE PRACTITIONER

## 2022-12-13 NOTE — PROGRESS NOTES
CC: ED follow up    HPI: Pt is a 22 y.o.  female  who presents for ED follow up. Seen 12/3/2022 with vaginal bleeding and abdominal pain. States she experienced heavy bleeding with clots which prompted the ED visit. Bleeding stopped about 1 week ago. She reports contiued intermittent lower abdominal cramping and back pain. She denies abnormal vaginal discharge, dysuria or fever. Reports associated loose watery stools 4-5 x per day starting around the same time as vaginal bleeding, denies blood in stool.  She reports monthly menstrual periods.      12/3/2022 pelvic u/s= EXAMINATION:  US PELVIS COMP WITH TRANSVAG NON-OB (XPD)     CLINICAL HISTORY:  heavy vaginal bleeding;     TECHNIQUE:  Transabdominal sonography of the pelvis was performed, followed by transvaginal sonography to better evaluate the uterus and ovaries.     COMPARISON:  None.     FINDINGS:  Uterus:     Size: 7.9 x 3.6 x 4.7 cm     Masses: None     Endometrium: Normal in this pre menopausal patient, measuring 4 mm.     Right ovary:     Size: 3.4 x 1.7 x 2.6 cm     Appearance: Normal     Vascular flow: Normal.     Left ovary:     Size: 3.2 x 2.9 x 1.7 cm     Appearance: Normal.  There is a dominant follicle measuring 1.5 x 1.2 x 1.3 cm.     Vascular Flow: Normal.     Free Fluid:     None.     Impression:     Unremarkable pelvic ultrasound.      Past Medical History:   Diagnosis Date    Depression        Past Surgical History:   Procedure Laterality Date    CHOLECYSTECTOMY      TONSILLECTOMY      WISDOM TOOTH EXTRACTION Bilateral        OB History    Para Term  AB Living   0 0 0 0 0 0   SAB IAB Ectopic Multiple Live Births   0 0 0 0         Family History   Problem Relation Age of Onset    Hypertension Paternal Grandmother     No Known Problems Mother     No Known Problems Father     Amblyopia Neg Hx     Blindness Neg Hx     Cataracts Neg Hx     Glaucoma Neg Hx     Macular degeneration Neg Hx     Retinal detachment Neg Hx      Strabismus Neg Hx     Asthma Neg Hx     Birth defects Neg Hx     Cancer Neg Hx     Chromosomal disorder Neg Hx     Diabetes Neg Hx     Early death Neg Hx     Heart disease Neg Hx     Hyperlipidemia Neg Hx     Mental illness Neg Hx     Seizures Neg Hx     Thyroid disease Neg Hx     Other Neg Hx        Social History     Socioeconomic History    Marital status: Single   Tobacco Use    Smoking status: Every Day     Types: Vaping with nicotine    Smokeless tobacco: Never   Substance and Sexual Activity    Alcohol use: Yes     Alcohol/week: 1.0 - 3.0 standard drink     Types: 1 - 3 Standard drinks or equivalent per week     Comment: socially    Drug use: No    Sexual activity: Yes     Partners: Male     Birth control/protection: OCP, Condom       /72   Wt 89.1 kg (196 lb 6.9 oz)   LMP 12/01/2022 Comment: bleeding started on 12/1/2022 with large blood clot. last regular period 11/20/2022  BMI 31.70 kg/m²     ROS:  GENERAL: No fever, chills, fatigability or weight loss.  VULVAR: No pain, no lesions and no itching.  VAGINAL: No relaxation, no itching, no discharge, + abnormal bleeding and no lesions.  ABDOMEN: + abdominal pain. Denies nausea. Denies vomiting. + diarrhea. No constipation  BREAST: Denies pain. No lumps. No discharge.  URINARY: No incontinence, no nocturia, no frequency and no dysuria.  CARDIOVASCULAR: No chest pain. No shortness of breath. No leg cramps.  NEUROLOGICAL: No headaches. No vision changes.    PE:   APPEARANCE: Well nourished, well developed, in no acute distress.  AFFECT: Alert and oriented x 3  SKIN: Warm, dry, & intact. No acne or hirsutism.  NECK: Neck symmetric  NODES: No inguinal or femoral lymph node enlargement  CHEST:  Easy, even breaths.  ABDOMEN: Soft.  Nontender, nondistended.  PELVIC: Normal external genitalia without lesions.  Normal hair distribution.  Adequate perineal body, normal urethral meatus. Vagina moist and well rugated without lesions or discharge.  Cervix pink,  without lesions, discharge or tenderness.  No significant cystocele or rectocele. Bimanual exam shows uterus to be normal size, regular, mobile and nontender.  Adnexa without masses or tenderness.   Anus Perineum:No lesions, no relaxation, no external hemorrhoids.  EXTREMITIES: No edema.    ASSESSMENT AND PLAN  1. Abnormal vaginal bleeding  TSH    C. trachomatis/N. gonorrhoeae by AMP DNA        Discussed:  -Pelvic ultrasound results were normal, will check TSH and GC/CT to r/o other causes of abnormal bleeding.   -Recommend following up with PCP for diarrhea as abdominal cramping/pain can be gastrointestinal related  -Pain/bleeding precautions given      Follow-up: Pending results and as above.    All questions answered, patient verbalizes understanding of the above.     20+ min total time spent with patient: this includes face to face time and non-face to face time preparing to see the patient (eg, review of tests), obtaining and/or reviewing separately obtained history, documenting clinical information in the electronic or other health record, independently interpreting results and communicating results to the patient/family/caregiver, or care coordinator.

## 2022-12-14 ENCOUNTER — OFFICE VISIT (OUTPATIENT)
Dept: FAMILY MEDICINE | Facility: HOSPITAL | Age: 22
End: 2022-12-14
Payer: MEDICAID

## 2022-12-14 VITALS
DIASTOLIC BLOOD PRESSURE: 67 MMHG | BODY MASS INDEX: 31.5 KG/M2 | HEIGHT: 66 IN | WEIGHT: 196 LBS | HEART RATE: 72 BPM | SYSTOLIC BLOOD PRESSURE: 108 MMHG

## 2022-12-14 DIAGNOSIS — R09.81 CONGESTION OF NASAL SINUS: ICD-10-CM

## 2022-12-14 DIAGNOSIS — R19.7 DIARRHEA, UNSPECIFIED TYPE: ICD-10-CM

## 2022-12-14 DIAGNOSIS — R10.30 LOWER ABDOMINAL PAIN: Primary | ICD-10-CM

## 2022-12-14 PROCEDURE — 99213 OFFICE O/P EST LOW 20 MIN: CPT | Performed by: STUDENT IN AN ORGANIZED HEALTH CARE EDUCATION/TRAINING PROGRAM

## 2022-12-14 RX ORDER — AMOXICILLIN 125 MG/1
TABLET, CHEWABLE ORAL
COMMUNITY
End: 2023-02-24

## 2022-12-14 NOTE — PROGRESS NOTES
[facility]  History & Physical    SUBJECTIVE:     Chief Complaint:   Chief Complaint   Patient presents with    Follow-up     After hospital visit    Abdominal Cramping    Cough    sneezing    Nasal Congestion       History of Present Illness:  22 y.o. female who  has a past medical history of Depression. presents to clinic today for abdomen pain and diarrhea, as well as sore throat, cough and congestion. Patient reports abdomen pain is the entire lower abdomen and started 2 weeks ago. The pain is constant but waxes and wanes in severity. Patient states that it has woken her from sleep and has prevented her from falling asleep over this period of time. Patient states the pain is sometimes triggered by eating to drinking. Patient states diarrhea started around the same time as the pain. Diarrhea is very soft but not watery. Patient was recently seen in ED for this problem as well as evaluated by her OBGYN. All lab work was negative. Patient reports congestion, cough and sore throat started 3 days ago. Patient denies and recent travel, change in diet, fever, nausea, and vomiting, blood in urine or stool.      Allergies:  Review of patient's allergies indicates:  No Known Allergies    Home Medications:  Current Outpatient Medications on File Prior to Visit   Medication Sig    albuterol (PROVENTIL/VENTOLIN HFA) 90 mcg/actuation inhaler Inhale 1-2 puffs into the lungs every 4 (four) hours as needed for Wheezing or Shortness of Breath. Rescue (Patient not taking: Reported on 12/14/2022)    amoxicillin (AMOXIL) 125 MG chewable tablet amoxicillin Take took one of her previously prescribed ones this morning No date recorded No form recorded No frequency recorded No route recorded No set duration recorded No set duration amount recorded suspended No dosage strength recorded No dosage strength units of measure recorded    venlafaxine HCl (EFFEXOR XR ORAL) Effexor XR Take No date recorded No form recorded No frequency  recorded No route recorded No set duration recorded No set duration amount recorded suspended No dosage strength recorded No dosage strength units of measure recorded     No current facility-administered medications on file prior to visit.       Past Medical History:   Diagnosis Date    Depression      Past Surgical History:   Procedure Laterality Date    CHOLECYSTECTOMY      TONSILLECTOMY      WISDOM TOOTH EXTRACTION Bilateral 2016     Family History   Problem Relation Age of Onset    Hypertension Paternal Grandmother     No Known Problems Mother     No Known Problems Father     Amblyopia Neg Hx     Blindness Neg Hx     Cataracts Neg Hx     Glaucoma Neg Hx     Macular degeneration Neg Hx     Retinal detachment Neg Hx     Strabismus Neg Hx     Asthma Neg Hx     Birth defects Neg Hx     Cancer Neg Hx     Chromosomal disorder Neg Hx     Diabetes Neg Hx     Early death Neg Hx     Heart disease Neg Hx     Hyperlipidemia Neg Hx     Mental illness Neg Hx     Seizures Neg Hx     Thyroid disease Neg Hx     Other Neg Hx      Social History     Tobacco Use    Smoking status: Every Day     Types: Vaping with nicotine    Smokeless tobacco: Never   Substance Use Topics    Alcohol use: Yes     Alcohol/week: 1.0 - 3.0 standard drink     Types: 1 - 3 Standard drinks or equivalent per week     Comment: socially    Drug use: No        Review of Systems   Constitutional:  Negative for fever.   HENT:  Positive for congestion and sore throat.    Eyes:  Negative for redness.   Respiratory:  Positive for cough.    Gastrointestinal:  Positive for abdominal pain and diarrhea. Negative for nausea and vomiting.   Genitourinary:  Negative for flank pain.   Musculoskeletal:  Positive for back pain.   Neurological:  Negative for dizziness, focal weakness and headaches.   Psychiatric/Behavioral: Negative.        OBJECTIVE:     Vital Signs:  Pulse: 72 (12/14/22 1030)  BP: 108/67 (12/14/22 1030)  Body mass index is 31.63 kg/m².  Physical  Exam  HENT:      Right Ear: External ear normal.      Nose: Congestion present.      Mouth/Throat:      Mouth: Mucous membranes are moist.   Eyes:      Extraocular Movements: Extraocular movements intact.      Pupils: Pupils are equal, round, and reactive to light.   Cardiovascular:      Rate and Rhythm: Regular rhythm.      Pulses: Normal pulses.   Pulmonary:      Effort: Pulmonary effort is normal.      Breath sounds: No rhonchi.   Abdominal:      General: There is no distension.      Palpations: Abdomen is soft. There is no mass.      Tenderness: There is abdominal tenderness. There is no right CVA tenderness, left CVA tenderness, guarding or rebound.   Musculoskeletal:         General: Normal range of motion.   Skin:     General: Skin is warm.      Capillary Refill: Capillary refill takes less than 2 seconds.   Neurological:      General: No focal deficit present.      Mental Status: She is alert and oriented to person, place, and time.   Psychiatric:         Mood and Affect: Mood normal.       Laboratory:  No results found for: HGBA1C    A/P:  Rosita was seen today for follow-up, abdominal cramping, cough, sneezing and nasal congestion. URI symptoms concerning for flu vs covid vs cold. Provided guidance on OTC symptom relief medication, if flu swab positive will place tamiflu order. Patient states she will home test for covid, will order paxlovid if needed. Abdomen symptoms are concerning especially length of flair and waking patient from sleep. Work for [facility] vs IBS vs infectious cause.    Diagnoses and all orders for this visit:    Lower abdominal pain  -     Hepatitis A antibody, IgG; Future  -     Lipid Panel; Future  -     C-reactive protein; Future  -     Sedimentation rate; Future  -     Calprotectin, Stool; Future  -     Stool Exam-Ova,Cysts,Parasites; Future  -     Fecal Immunochemical Test (iFOBT); Future    Diarrhea, unspecified type  -     Hepatitis A antibody, IgG; Future  -     Lipid Panel;  Future  -     C-reactive protein; Future  -     Sedimentation rate; Future  -     Calprotectin, Stool; Future  -     Stool Exam-Ova,Cysts,Parasites; Future  -     Fecal Immunochemical Test (iFOBT); Future    Congestion of nasal sinus  -     POCT Influenza A/B        Follow up in about 2 months (around 2/14/2023), or if symptoms worsen or fail to improve.      Marquise Stark MD  [facility] PGY-2  12/14/2022

## 2022-12-15 ENCOUNTER — TELEPHONE (OUTPATIENT)
Dept: OBSTETRICS AND GYNECOLOGY | Facility: CLINIC | Age: 22
End: 2022-12-15
Payer: MEDICAID

## 2022-12-15 DIAGNOSIS — A74.9 CHLAMYDIA INFECTION: Primary | ICD-10-CM

## 2022-12-15 LAB
C TRACH DNA SPEC QL NAA+PROBE: DETECTED
N GONORRHOEA DNA SPEC QL NAA+PROBE: NOT DETECTED

## 2022-12-15 RX ORDER — ONDANSETRON 4 MG/1
8 TABLET, ORALLY DISINTEGRATING ORAL EVERY 8 HOURS PRN
Qty: 15 TABLET | Refills: 0 | Status: SHIPPED | OUTPATIENT
Start: 2022-12-15

## 2022-12-15 RX ORDER — AZITHROMYCIN 500 MG/1
1000 TABLET, FILM COATED ORAL ONCE
Qty: 2 TABLET | Refills: 0 | Status: SHIPPED | OUTPATIENT
Start: 2022-12-15 | End: 2022-12-15

## 2022-12-15 NOTE — TELEPHONE ENCOUNTER
Called spoke to pt. States she vomited clear liquid about 10 minutes after taking Azythromycin, she did not see tablets. She states she continues to have diarrhea. Vomiting has stopped, but has some nausea. Zofran sent for nausea, discussed think diarrhea un related to Azythromycin continue treatment/work up with PCP. It does not seem as if she vomited medication. All questions answered, patient verbalizes understanding of the above.

## 2022-12-15 NOTE — TELEPHONE ENCOUNTER
Called pt. Says she has feels sick has diarrhea, also vomiting alot. started taking pills today this morning.

## 2022-12-15 NOTE — TELEPHONE ENCOUNTER
----- Message from Sonia Frazier sent at 12/15/2022 11:37 AM CST -----  Type:  Needs Medical Advice    Who Called:  PT  Symptoms (please be specific):  POSSIBLE SIDE EFFECT  How long has patient had these symptoms:    Pharmacy name and phone #:      [id number] [address] AT [facility]   Phone:  [phone]  Fax:  [phone]      Would the patient rather a call back or a response via [facility]?     Best Call Back Number: [phone] (M)       Additional Information: PT WANTS TO SPEAK TO OFFFIICE ABOUT MEDICINE. IT MADE HER SICK AND VOMIT ALL OVER. SHE HAD A SMALL MEAL       azithromycin (ZITHROMAX) 500 MG tablet 2 tablet 0 12/15/2022 12/15/2022 --  Sig - Route: Take 2 tablets (1,000 mg total) by mouth once. for 1 dose - Oral  Sent to pharmacy as: azithromycin (ZITHROMAX) 500 MG tablet  Class: Normal  Order: [id number]  Date/Time Signed: 12/15/2022 08:52      E-Prescribing Status: Receipt confirmed by pharmacy (12/15/2022  8:52 AM CST)    Pharmacy      [facility] #[id number] - [place], [place] - [address] AT [facility]   Associated Diagnoses      Chlamydia infection  - Primary

## 2023-01-12 ENCOUNTER — PATIENT MESSAGE (OUTPATIENT)
Dept: FAMILY MEDICINE | Facility: HOSPITAL | Age: 23
End: 2023-01-12
Payer: MEDICAID

## 2023-02-18 ENCOUNTER — HOSPITAL ENCOUNTER (EMERGENCY)
Facility: HOSPITAL | Age: 23
Discharge: HOME | End: 2023-02-18
Attending: EMERGENCY MEDICINE
Payer: MEDICAID

## 2023-02-18 VITALS
OXYGEN SATURATION: 96 % | WEIGHT: 180 LBS | RESPIRATION RATE: 21 BRPM | HEART RATE: 78 BPM | TEMPERATURE: 98 F | BODY MASS INDEX: 29.05 KG/M2 | DIASTOLIC BLOOD PRESSURE: 68 MMHG | SYSTOLIC BLOOD PRESSURE: 148 MMHG

## 2023-02-18 DIAGNOSIS — S93.401A SPRAIN OF RIGHT ANKLE, UNSPECIFIED LIGAMENT, INITIAL ENCOUNTER: Primary | ICD-10-CM

## 2023-02-18 DIAGNOSIS — M79.605 LEFT LEG PAIN: ICD-10-CM

## 2023-02-18 DIAGNOSIS — M79.604 RIGHT LEG PAIN: ICD-10-CM

## 2023-02-18 PROCEDURE — 99285 PR EMERGENCY DEPT VISIT,LEVEL V: ICD-10-PCS | Mod: ,,, | Performed by: EMERGENCY MEDICINE

## 2023-02-18 PROCEDURE — 99284 EMERGENCY DEPT VISIT MOD MDM: CPT | Mod: 25

## 2023-02-18 PROCEDURE — 63600175 PHARM REV CODE 636 W HCPCS: Performed by: EMERGENCY MEDICINE

## 2023-02-18 PROCEDURE — 99285 EMERGENCY DEPT VISIT HI MDM: CPT | Mod: ,,, | Performed by: EMERGENCY MEDICINE

## 2023-02-18 PROCEDURE — 96374 THER/PROPH/DIAG INJ IV PUSH: CPT | Mod: 59

## 2023-02-18 PROCEDURE — 29515 APPLICATION SHORT LEG SPLINT: CPT

## 2023-02-18 RX ORDER — FENTANYL CITRATE 50 UG/ML
25 INJECTION, SOLUTION INTRAMUSCULAR; INTRAVENOUS
Status: COMPLETED | OUTPATIENT
Start: 2023-02-18 | End: 2023-02-18

## 2023-02-18 RX ORDER — HYDROCODONE BITARTRATE AND ACETAMINOPHEN 5; 325 MG/1; MG/1
1 TABLET ORAL EVERY 6 HOURS PRN
Qty: 9 TABLET | Refills: 0 | Status: SHIPPED | OUTPATIENT
Start: 2023-02-18

## 2023-02-18 RX ADMIN — FENTANYL CITRATE 25 MCG: 50 INJECTION, SOLUTION INTRAMUSCULAR; INTRAVENOUS at 04:02

## 2023-02-18 NOTE — ED TRIAGE NOTES
Patient arrived to the ED by personal vehicle with father for the primary complaint of a fall last night resulting in a swollen R ankle. Patient also has bilateral bruises to lower legs and abrasions mostly on L leg. Pain at rest 9/10

## 2023-02-18 NOTE — ED PROVIDER NOTES
Encounter Date: 2/18/2023       History     Chief Complaint   Patient presents with    Ankle Pain     Fall last night, right ankle pain/ injury and swelling, did not hit head      This is a 22-year-old female who presents to the emergency department with bilateral lower extremity pain that is most predominant at the right ankle.  She states that she was out at [facility] last night and tripped and fell.  She struck both of her lower extremities during the fall and twisted her right ankle although she does not recall the exact mechanism of the twisting.  Since then she is had significant constant discomfort worsened with any manipulation or weight-bearing particularly on the right ankle.  She is had no known head trauma.  No headache.  No vomiting since.  No neck or back pain.  No chest pain or shortness of breath.  No abdominal pain.    Review of patient's allergies indicates:  No Known Allergies  Past Medical History:   Diagnosis Date    Depression      Past Surgical History:   Procedure Laterality Date    CHOLECYSTECTOMY      TONSILLECTOMY      WISDOM TOOTH EXTRACTION Bilateral 2016     Family History   Problem Relation Age of Onset    Hypertension Paternal Grandmother     No Known Problems Mother     No Known Problems Father     Amblyopia Neg Hx     Blindness Neg Hx     Cataracts Neg Hx     Glaucoma Neg Hx     Macular degeneration Neg Hx     Retinal detachment Neg Hx     Strabismus Neg Hx     Asthma Neg Hx     Birth defects Neg Hx     Cancer Neg Hx     Chromosomal disorder Neg Hx     Diabetes Neg Hx     Early death Neg Hx     Heart disease Neg Hx     Hyperlipidemia Neg Hx     Mental illness Neg Hx     Seizures Neg Hx     Thyroid disease Neg Hx     Other Neg Hx      Social History     Tobacco Use    Smoking status: Every Day     Types: Vaping with nicotine    Smokeless tobacco: Never   Substance Use Topics    Alcohol use: Yes     Alcohol/week: 1.0 - 3.0 standard drink      Types: 1 - 3 Standard drinks or equivalent per week     Comment: socially    Drug use: No     Review of Systems   Constitutional:  Negative for fever.   HENT:  Negative for sore throat.    Eyes:  Negative for visual disturbance.   Respiratory:  Negative for shortness of breath.    Cardiovascular:  Negative for chest pain.   Gastrointestinal:  Negative for nausea.   Endocrine: Negative for polyuria.   Genitourinary:  Negative for dysuria.   Musculoskeletal:  Negative for back pain.        Bilateral lower extremity pain worse at the right ankle.   Skin:  Negative for rash.   Allergic/Immunologic: Negative for immunocompromised state.   Neurological:  Negative for weakness.   Hematological:  Does not bruise/bleed easily.     Physical Exam     Initial Vitals [02/18/23 1344]   BP Pulse Resp Temp SpO2   130/61 100 16 98 °F (36.7 °C) 98 %      MAP       --         Physical Exam    Nursing note and vitals reviewed.  Constitutional: She appears well-developed and well-nourished.   Appears uncomfortable, particularly when manipulating right lower extremity.   HENT:   Head: Normocephalic and atraumatic.   Nose: Nose normal.   Mouth/Throat: Oropharynx is clear and moist.   Eyes: Conjunctivae and EOM are normal. Pupils are equal, round, and reactive to light.   Neck: Neck supple.   No midline C-spine tenderness to palpation.   Normal range of motion.  Cardiovascular:  Normal rate, regular rhythm, normal heart sounds and intact distal pulses.     Exam reveals no gallop and no friction rub.       No murmur heard.  Pulmonary/Chest: No respiratory distress. She has no wheezes. She has no rhonchi. She has no rales.   Abdominal: Abdomen is soft. Bowel sounds are normal. She exhibits no distension. There is no abdominal tenderness. There is no rebound and no guarding.   Musculoskeletal:      Cervical back: Normal range of motion and neck supple.      Comments: Focusing on the patient's bilateral lower extremities, the patient has  significant bruising scattered to the bilateral anterior shins of the left knee anteriorly.  The patient also has superficial abrasions to the anterior left knee as well as a large linear superficial laceration to the anterior shin that is already scabbed over.  There is no surrounding redness at this time to any of these lesions.  No induration.  The patient has tenderness to palpation basically throughout the right and left lower extremity from the knees distal and there is also noted right ankle swelling particularly laterally with both lateral and medial malleolar tenderness present to palpation.  Her extensor mechanism is intact to her bilateral knees in her ligaments are stable bilaterally.  She is 2+ DP pulses bilateral and symmetric.  5/5 dorsiflexion and plantar flexion bilaterally as well as positive EHL and sensation to fine touch is grossly intact throughout.  She has no midline tenderness to the thoracic or lumbar spine.  Her bilateral upper extremity exam is benign with normal motor function throughout no tenderness to palpation or pain with range of motion and normal sensation.     Neurological: She is alert and oriented to person, place, and time. She has normal strength. No cranial nerve deficit or sensory deficit. GCS score is 15. GCS eye subscore is 4. GCS verbal subscore is 5. GCS motor subscore is 6.   Skin: Capillary refill takes less than 2 seconds.   See above.  Patient has scattered abrasions, ecchymoses on lacerations noted.   Psychiatric: She has a normal mood and affect.       ED Course   Procedures  Labs Reviewed - No data to display       Imaging Results    None          Medications   fentaNYL 50 mcg/mL injection 25 mcg (has no administration in time range)   Tdap (BOOSTRIX) vaccine injection 0.5 mL (has no administration in time range)     Medical Decision Making:   History:   I obtained history from: someone other than patient.       <> Summary of History: Patient is here with father  who also provides history.   Old Medical Records: I decided to obtain old medical records.  Old Records Summarized: records from clinic visits.       <> Summary of Records: Last seen here in Dec for vag bleeding.   Initial Assessment:   21 yo female who presents with bilateral lower extremity injuries after a fall last night, most predominantly with pain at the right ankle.   Differential Diagnosis:   We needed to exclude acute fracture / malalignment at the right ankle as well as throughout both lower extremities from the knee distal. The remainder of her quaternary trauma exam was benign.   Independently Interpreted Test(s):   I have ordered and independently interpreted X-rays - see prior notes.  Clinical Tests:   Radiological Study: Ordered and Reviewed  ED Management:  Patient was given parenteral fentanyl for pain. Images were obtained. I have independently reviewed and interpreted these films and radiology has interpreted them as well. The patient has no evidence of fracture, even at the right ankle. It appears that there is some effusion there. Likely a sprain. This sprain is a severe one on exam. So, I am placing the patient in a walking boot (crutches if needed) and having her follow up with sports medicine for repeat examination/evaluation within the week. Of note, the patient had no proximal fibular head tenderness bilaterally, but I additionally filmed the proximal tib/fib regions bilaterally and there is no evidence of Masionneuve fracture. Pt will get 9 lortabs prescribed upon discharge and otherwise may use motrin as directed for pain. I have also asked her to keep a close eye on her abrasions and superficial laceration given that she fell out at [facility] to make sure she does not see any sign of infection develop at those areas. I instructed her to place some bacitracin/neosporin to those wounds and keep a close eye on them. She voiced understanding. She is in stable condition for discharge.   ED  Diagnosis:  1. Acute right ankle sprain.   2. Multiple acute abrasions and superficial laceration to the bilateral lower extremities.   3. Status post fall.                         Clinical Impression:   Final diagnoses:  [M79.604] Right leg pain  [M79.605] Left leg pain               Lidia Latif MD  02/20/23 8503

## 2023-02-18 NOTE — Clinical Note
"Rosita Mossedith Dyson was seen and treated in our emergency department on 2/18/2023.  She may return to work on 02/19/2023.       If you have any questions or concerns, please don't hesitate to call.      Lidia Latif MD"

## 2023-02-18 NOTE — ED NOTES
Tdap    Linked Order: [id number]   Current Status    Updated on: 9/11/2022  2:27 PM    Name Date Status Dose VIS Date Route Site  Lot# Given By Verified By   Tdap 9/11/2022 Given 0.5 mL 08/06/2021 IM LD [id number] Maryjane Santiago MA --   Exp. Date NDC # Product Time Location External Inventory Class Comment   4/14/2024 52836-115-01 BOOSTRIX TDAP  2:25 PM -- -- -- --   Question Answer Comment

## 2023-02-19 NOTE — DISCHARGE INSTRUCTIONS
Ibuprofen may be used as well as directed for your pain.  Also, once you stopped taking the Lortab, you may also take Tylenol and ibuprofen for pain as directed.  Lortab contains Tylenol and thus he should not take both of those medications at the same time.  Our goal in the emergency department is to always give you outstanding care and exceptional service. You may receive a survey by mail or e-mail in the next week regarding your experience in our ED. We would greatly appreciate your completing and returning the survey. Your feedback provides us with a way to recognize our staff who give very good care and it helps us learn how to improve when your experience was below our aspiration of excellence.

## 2023-02-22 ENCOUNTER — TELEPHONE (OUTPATIENT)
Dept: SPORTS MEDICINE | Facility: CLINIC | Age: 23
End: 2023-02-22
Payer: MEDICAID

## 2023-02-22 NOTE — TELEPHONE ENCOUNTER
----- Message from Andrew Styles MA sent at 2/22/2023 12:16 PM CST -----  Regarding: appt  Contact: pt   Patient is calling to get schedule for an appt no appt in epic referral is in [facility] please call pt at   pt

## 2023-02-24 ENCOUNTER — OFFICE VISIT (OUTPATIENT)
Dept: FAMILY MEDICINE | Facility: HOSPITAL | Age: 23
End: 2023-02-24
Payer: MEDICAID

## 2023-02-24 ENCOUNTER — HOSPITAL ENCOUNTER (OUTPATIENT)
Dept: RADIOLOGY | Facility: HOSPITAL | Age: 23
Discharge: HOME | End: 2023-02-24
Attending: STUDENT IN AN ORGANIZED HEALTH CARE EDUCATION/TRAINING PROGRAM
Payer: MEDICAID

## 2023-02-24 VITALS
DIASTOLIC BLOOD PRESSURE: 78 MMHG | WEIGHT: 203.25 LBS | BODY MASS INDEX: 32.81 KG/M2 | SYSTOLIC BLOOD PRESSURE: 117 MMHG | HEART RATE: 65 BPM

## 2023-02-24 DIAGNOSIS — S99.911D INJURY OF RIGHT ANKLE, SUBSEQUENT ENCOUNTER: Primary | ICD-10-CM

## 2023-02-24 DIAGNOSIS — F90.9 ATTENTION DEFICIT HYPERACTIVITY DISORDER (ADHD), UNSPECIFIED ADHD TYPE: ICD-10-CM

## 2023-02-24 DIAGNOSIS — S99.911D INJURY OF RIGHT ANKLE, SUBSEQUENT ENCOUNTER: ICD-10-CM

## 2023-02-24 PROCEDURE — 96372 THER/PROPH/DIAG INJ SC/IM: CPT

## 2023-02-24 PROCEDURE — 73610 XR ANKLE COMPLETE 3 VIEW RIGHT: ICD-10-PCS | Mod: 26,RT,, | Performed by: RADIOLOGY

## 2023-02-24 PROCEDURE — 99213 OFFICE O/P EST LOW 20 MIN: CPT | Mod: 25 | Performed by: STUDENT IN AN ORGANIZED HEALTH CARE EDUCATION/TRAINING PROGRAM

## 2023-02-24 PROCEDURE — 73610 X-RAY EXAM OF ANKLE: CPT | Mod: 26,RT,, | Performed by: RADIOLOGY

## 2023-02-24 PROCEDURE — 73610 X-RAY EXAM OF ANKLE: CPT | Mod: TC,FY,RT

## 2023-02-24 RX ORDER — KETOROLAC TROMETHAMINE 30 MG/ML
30 INJECTION, SOLUTION INTRAMUSCULAR; INTRAVENOUS
Status: DISCONTINUED | OUTPATIENT
Start: 2023-02-24 | End: 2023-02-24

## 2023-02-24 RX ORDER — KETOROLAC TROMETHAMINE 30 MG/ML
30 INJECTION, SOLUTION INTRAMUSCULAR; INTRAVENOUS
Status: COMPLETED | OUTPATIENT
Start: 2023-02-24 | End: 2023-02-24

## 2023-02-24 RX ADMIN — KETOROLAC TROMETHAMINE 30 MG: 30 INJECTION, SOLUTION INTRAMUSCULAR at 11:02

## 2023-02-24 NOTE — LETTER
February 24, 2023      [facility]  [address], [address]  [place] [address]  Phone: [phone]  Fax: [phone]       Patient: Rosita Dyson   YOB: 2000  Date of Visit: 02/24/2023    To Whom It May Concern:    Harinder Dyson  was at [facility] on 2/19/23 and 02/24/2023. The patient may return to work on 2/28/23 with restrictions on frequent walking or prolonged standing, Patient should limit excess weight bearing to less than 20 lbs. If you have any questions or concerns, or if I can be of further assistance, please do not hesitate to contact me.    Sincerely,      Marquise Stark MD

## 2023-02-24 NOTE — PROGRESS NOTES
Toradol 30mg given IM RUOQ of glut muscle.  Pt tolerated well.  Instructed to wait 15 minutes post vaccine.

## 2023-02-28 NOTE — PROGRESS NOTES
[facility]  History & Physical    SUBJECTIVE:     Chief Complaint:   Chief Complaint   Patient presents with    Ankle Injury       History of Present Illness:  22 y.o. female who  has a past medical history of Depression. presents to clinic today for right ankle following fall 4 days ago. Pain has been persistent with minimal improvement. Patient states ankle can support weight for at least 5 steps.Patient would also like to discuss ADHD. No other complaints      Allergies:  Review of patient's allergies indicates:  No Known Allergies    Home Medications:  Current Outpatient Medications on File Prior to Visit   Medication Sig    albuterol (PROVENTIL/VENTOLIN HFA) 90 mcg/actuation inhaler Inhale 1-2 puffs into the lungs every 4 (four) hours as needed for Wheezing or Shortness of Breath. Rescue    HYDROcodone-acetaminophen (NORCO) 5-325 mg per tablet Take 1 tablet by mouth every 6 (six) hours as needed for Pain.    ondansetron (ZOFRAN-ODT) 4 MG TbDL Take 2 tablets (8 mg total) by mouth every 8 (eight) hours as needed (nausea/vomiting).    venlafaxine HCl (EFFEXOR XR ORAL) Effexor XR Take No date recorded No form recorded No frequency recorded No route recorded No set duration recorded No set duration amount recorded suspended No dosage strength recorded No dosage strength units of measure recorded     No current facility-administered medications on file prior to visit.       Past Medical History:   Diagnosis Date    Depression      Past Surgical History:   Procedure Laterality Date    CHOLECYSTECTOMY      TONSILLECTOMY      WISDOM TOOTH EXTRACTION Bilateral 2016     Family History   Problem Relation Age of Onset    Hypertension Paternal Grandmother     No Known Problems Mother     No Known Problems Father     Amblyopia Neg Hx     Blindness Neg Hx     Cataracts Neg Hx     Glaucoma Neg Hx     Macular degeneration Neg Hx     Retinal detachment Neg Hx     Strabismus Neg Hx     Asthma Neg Hx     Birth defects Neg  Hx     Cancer Neg Hx     Chromosomal disorder Neg Hx     Diabetes Neg Hx     Early death Neg Hx     Heart disease Neg Hx     Hyperlipidemia Neg Hx     Mental illness Neg Hx     Seizures Neg Hx     Thyroid disease Neg Hx     Other Neg Hx      Social History     Tobacco Use    Smoking status: Every Day     Types: Vaping with nicotine    Smokeless tobacco: Never   Substance Use Topics    Alcohol use: Yes     Alcohol/week: 1.0 - 3.0 standard drink     Types: 1 - 3 Standard drinks or equivalent per week     Comment: socially    Drug use: No        Review of Systems   Constitutional:  Negative for fever and weight loss.   HENT:  Negative for hearing loss and sore throat.    Eyes:  Negative for blurred vision.   Respiratory:  Negative for cough, shortness of breath and wheezing.    Cardiovascular:  Negative for chest pain and leg swelling.   Gastrointestinal:  Negative for heartburn, nausea and vomiting.   Genitourinary:  Negative for dysuria.   Musculoskeletal:  Negative for back pain, joint pain and myalgias.   Neurological:  Negative for dizziness, weakness and headaches.      OBJECTIVE:     Vital Signs:  Pulse: 65 (02/24/23 1055)  BP: 117/78 (02/24/23 1055)  Body mass index is 32.81 kg/m².  Physical Exam  HENT:      Head: Normocephalic.      Right Ear: External ear normal.      Left Ear: External ear normal.      Nose: Nose normal.      Mouth/Throat:      Mouth: Mucous membranes are moist.   Eyes:      Extraocular Movements: Extraocular movements intact.      Pupils: Pupils are equal, round, and reactive to light.   Cardiovascular:      Rate and Rhythm: Normal rate.      Pulses: Normal pulses.   Pulmonary:      Effort: Pulmonary effort is normal.   Abdominal:      General: Abdomen is flat.   Musculoskeletal:         General: Tenderness and signs of injury present.      Cervical back: Normal range of motion.      Comments: Reduced ROM and tenderness on lateral right ankle   Skin:     General: Skin is warm.       Capillary Refill: Capillary refill takes less than 2 seconds.   Neurological:      General: No focal deficit present.      Mental Status: She is alert.   Psychiatric:         Mood and Affect: Mood normal.       Laboratory:  No results found for: HGBA1C    A/P:  Rosita was seen today for ankle injury. Recent fall resulting in right ankle sprain. Initial xray shows no fracture. Will repeat xray to assess for occult fracture. Encourage ROM exercise as tolerated to prevent joint stifness. Ketorolac injection at this visit. Advise use of naproxen and tylenol for pain control. Reassess in 3 weeks or if pain worsens.     Diagnoses and all orders for this visit:    Injury of right ankle, subsequent encounter  -     X-Ray Ankle Complete 3 View Right; Future  -     ketorolac injection 30 mg    Attention deficit hyperactivity disorder (ADHD), unspecified ADHD type        -   Provided list of resources for ADHD assessment       Follow up in about 3 weeks (around 3/17/2023).      Marquise Stark MD  \[facility]\"" Family Medicine PGY-2  02/27/2023

## 2023-02-28 NOTE — PROGRESS NOTES
I assume primary medical responsibility for this patient. I have reviewed the history, physical, and assessement & treatment plan with the resident and agree that the care is reasonable and necessary. This service has been performed by a resident without the presence of a teaching physician under the primary care exception. If necessary, an addendum of additional findings or evaluation beyond the resident documentation will be noted below.     Taylor Koch MD

## 2023-03-28 ENCOUNTER — OFFICE VISIT (OUTPATIENT)
Dept: FAMILY MEDICINE | Facility: HOSPITAL | Age: 23
End: 2023-03-28
Payer: MEDICAID

## 2023-03-28 DIAGNOSIS — S99.911D INJURY OF RIGHT ANKLE, SUBSEQUENT ENCOUNTER: Primary | ICD-10-CM

## 2023-03-28 DIAGNOSIS — Z11.3 SCREENING FOR STD (SEXUALLY TRANSMITTED DISEASE): ICD-10-CM

## 2023-03-28 PROCEDURE — 99213 OFFICE O/P EST LOW 20 MIN: CPT | Performed by: STUDENT IN AN ORGANIZED HEALTH CARE EDUCATION/TRAINING PROGRAM

## 2023-03-28 NOTE — PROGRESS NOTES
Subjective:      Patient ID: Rosita Dyson is a 22 y.o. female.    Chief Complaint: Follow-up (F/u   leg,    Also check STD- new partner)    21 yo F w/ PMH of depression, presenting for f/u of R ankle pain. She is wearing a walking boot. Swelling has improved somewhat. She still has pain and swelling in R ankle,  medial malleolus region and posterior heel.  She is able to walk and ambulate greater than 4 steps on the foot. Has not seen PT/OT. She is alternating tylenol and ibuprofen and has been icing and elevating.     Requesting STD testing as she has a new male partner. She has not had sexual intercourse with this new male partner. Recently positive for chlamydia and she wants testing for cure. She reports being prescribed abx for 1 day and had threw up the pills thereafter. She currently denies vaginal discharge, vaginal irritation or discomfort and denies having any urinary symptoms at this time.     Review of Systems   Constitutional:  Negative for chills and fever.   Eyes:  Negative for visual disturbance.   Respiratory:  Negative for cough and shortness of breath.    Cardiovascular:  Negative for chest pain.   Gastrointestinal:  Negative for abdominal pain, nausea and vomiting.   Genitourinary:  Negative for difficulty urinating, dysuria, frequency, urgency and vaginal discharge.   Musculoskeletal:         R medial ankle pain   Neurological:  Negative for headaches.    Objective:     Vitals:    03/28/23 1538   BP: 125/80   Pulse: 89     Physical Exam  Vitals and nursing note reviewed.   Constitutional:       General: She is not in acute distress.     Appearance: Normal appearance. She is not ill-appearing.   Eyes:      General:         Right eye: No discharge.         Left eye: No discharge.      Conjunctiva/sclera: Conjunctivae normal.   Cardiovascular:      Rate and Rhythm: Normal rate and regular rhythm.   Pulmonary:      Effort: Pulmonary effort is normal. No respiratory distress.      Breath sounds:  Normal breath sounds. No wheezing.   Abdominal:      Palpations: Abdomen is soft.      Tenderness: There is no abdominal tenderness.   Genitourinary:     General: Normal vulva.      Vagina: No vaginal discharge.      Comments: Negative CMT on bimanual exam. No adnexal mass palpated on pelvic exam. No cervical discharge on speculum exam.  Musculoskeletal:         General: Swelling (mild swelling to R medial ankle, no tenderness to palpation of medial maellolus or posterior margin) present.   Neurological:      Mental Status: She is alert.   Psychiatric:         Behavior: Behavior normal.     Assessment:      1. Injury of right ankle, subsequent encounter    2. Screening for STD (sexually transmitted disease)      Plan:     Injury of right ankle, subsequent encounter  -     Ambulatory referral/consult to Physical/Occupational Therapy; Future; Expected date: 04/04/2023    Screening for STD (sexually transmitted disease)    Cervical and vaginal swabs performed in clinic today and sent off to lab, will f/u with patient with any abnormal results. Discussed continued RICE for R medial ankle sprain,    Follow up in about 2 months (around 5/28/2023) for f/u ankle sprain after PT.

## 2023-03-29 VITALS
WEIGHT: 207.25 LBS | HEART RATE: 89 BPM | HEIGHT: 66 IN | SYSTOLIC BLOOD PRESSURE: 125 MMHG | BODY MASS INDEX: 33.31 KG/M2 | DIASTOLIC BLOOD PRESSURE: 80 MMHG

## 2023-04-05 ENCOUNTER — TELEPHONE (OUTPATIENT)
Dept: FAMILY MEDICINE | Facility: HOSPITAL | Age: 23
End: 2023-04-05
Payer: MEDICAID

## 2023-04-05 DIAGNOSIS — B96.89 GARDNERELLA VAGINALIS INFECTION: Primary | ICD-10-CM

## 2023-04-05 DIAGNOSIS — N76.0 GARDNERELLA VAGINALIS INFECTION: Primary | ICD-10-CM

## 2023-04-05 RX ORDER — METRONIDAZOLE 500 MG/1
500 TABLET ORAL 2 TIMES DAILY
Qty: 14 TABLET | Refills: 0 | Status: SHIPPED | OUTPATIENT
Start: 2023-04-05 | End: 2023-04-12

## 2023-04-05 NOTE — TELEPHONE ENCOUNTER
Called patient and review vaginal/cervical swab results with patient. Results positive for gardnerella. Rx sent to pharmacy. Discussed with patient. Advised close f/u for any new or worsening symptoms. All questions answered.    Gadiel Barcenas MD  [employer] , PGY-3    ----- Message from Natali Garcia LPN sent at 4/5/2023 11:54 AM CDT -----  Regarding: FW: lab results    ----- Message -----  From: Quintin Boland  Sent: 4/5/2023  11:24 AM CDT  To: Narinder Ramesh Staff  Subject: lab results                                      Pt wants her lab results read/explained to her. Wants to know if you received them.

## 2023-04-11 ENCOUNTER — CLINICAL SUPPORT (OUTPATIENT)
Dept: REHABILITATION | Facility: HOSPITAL | Age: 23
End: 2023-04-11
Payer: MEDICAID

## 2023-04-11 DIAGNOSIS — S99.911D INJURY OF RIGHT ANKLE, SUBSEQUENT ENCOUNTER: ICD-10-CM

## 2023-04-11 DIAGNOSIS — R29.898 DECREASED STRENGTH OF LOWER EXTREMITY: ICD-10-CM

## 2023-04-11 DIAGNOSIS — M25.671 DECREASED RANGE OF MOTION OF RIGHT ANKLE: ICD-10-CM

## 2023-04-11 PROBLEM — M25.673 DECREASED ROM OF ANKLE: Status: ACTIVE | Noted: 2023-04-11

## 2023-04-11 PROCEDURE — 97161 PT EVAL LOW COMPLEX 20 MIN: CPT

## 2023-04-13 NOTE — PLAN OF CARE
[facility]   Physical Therapy Initial Evaluation       Name: Rosita Dyson  [facility] Number: [id number]    Therapy Diagnosis:   Encounter Diagnoses   Name Primary?    Injury of right ankle, subsequent encounter     Decreased range of motion of right ankle     Decreased strength of lower extremity         Physician: Gadiel Barcenas MD    Physician Orders: PT Eval and Treat   Medical Diagnosis from Referral: S99.911D (ICD-10-CM) - Injury of right ankle, subsequent encounter  Evaluation Date: 4/11/2023  Authorization Period Expiration: 3/27/2023  Plan of Care Expiration: 6/20/2023  Progress Note Due: 5/11/2023  Visit # / Visits authorized: 1/ 1   FOTO: 0/3    Precautions: Standard     Time In: 4:03  Time Out: 4:50  Total Appointment Time (timed & untimed codes): 47 minutes      SUBJECTIVE     Date of onset: 2/17/2023    History of current condition - Viola reports: she twisted her ankle when stepping off a curb on 2/17/2023. She went to ER on 2/18/2023 where she had xrays done that showed no Fx.  She was then placed in boot and crutches. On 2/28/2023 she saw primary care and was told to continue to wear boot until seeing PT. Pt still has swelling around her ankle. She has some pain with walking when she steps wrong. She is still wearing a boot at this time. She does walks some around the house without boot. Pt does get numbness and tingling in the back or her ankle.    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  Prior Level of Function: Pt was able to perform all task without ankle pain  Current Level of Function: Pt now has pain with ambulation and standing    Pain:  Current 5/10, worst 7/10, best 1/10   Location: right ankle   Description: Tight and Sharp  Aggravating Factors: Standing and Walking  Easing Factors: rest    Patients  goals: Pt would like to get back to walking normally       Medical History:   Past Medical History:   Diagnosis Date    Depression        Surgical History:   Rosita Dyson  has a past surgical history that includes Cholecystectomy; [place] tooth extraction (Bilateral, 2016); and Tonsillectomy.    Medications:   Rosita has a current medication list which includes the following prescription(s): albuterol, hydrocodone-acetaminophen, ondansetron, and venlafaxine hcl.    Allergies:   Review of patient's allergies indicates:  No Known Allergies       OBJECTIVE     Visual assessment: Edema surround bilateral malleoli     Gait: antalgic, dynamic pronation, decreased ankle dorsiflexion     Hip Right Right Left Left    PROM MMT PROM MMT   Extension 10 4/5 10 4-/5   Abduction 45 4-/5 45 3+/5      *denotes pain    Ankle Right Right Left Left    PROM MMT PROM MMT   Plantarflexion 10 4/5* 20 5/5   Dorsiflexion 50 4/5*  60 5/5   Inversion 20* 3/5* 35 5/5   Eversion 20 3/5* 25 5/5      Joint Mobility:  - Hypomobile posterior glide of talus    SLR:  + Tibial neural tension      Limitation/Restriction for FOTO ankle Survey    Therapist reviewed FOTO scores for Rosita Dyson on 4/11/2023.   FOTO documents entered into [employer] - see Media section.    Limitation Score: 65%         TREATMENT     Total Treatment time (time-based codes) separate from Evaluation: 16 minutes      Viola received the treatments listed below:      therapeutic exercises to develop strength, endurance, ROM, and flexibility for 16 minutes including:    Half kneeling ankle dorsiflexion 15x  Arch doming 10x5s holds  Ankle 4 way without resistance 2x15  Tibial Nerve floss 25x      PATIENT EDUCATION AND HOME EXERCISES     Education provided:   - HEP  - Transition out of boot to brace  - Continue to elevate ankle for edema control     Written Home Exercises Provided: yes. Exercises were reviewed and Viola was able to demonstrate them prior to the end of the  session.  Viola demonstrated good  understanding of the education provided. See EMR under Patient Instructions for exercises provided during therapy sessions.    ASSESSMENT     Rosita is a 22 y.o. female referred to outpatient Physical Therapy with a medical diagnosis of  S99.911D (ICD-10-CM) - Injury of right ankle, subsequent encounter. Patient presents with decreased ankle range of motion, adverse neural tension, gait deficits, decreased ankle weakness, decreased balance and hypomobile talus. Pt demonstrated HEP during session and tolerated treatment well. Pt was advised to transition from the boot to a ankle brace and compression sleeve. Pt needs to address deficits in order to return to prior level of function.     Patient prognosis is Good.   Patient will benefit from skilled outpatient Physical Therapy to address the deficits stated above and in the chart below, provide patient /family education, and to maximize patientt's level of independence.     Plan of care discussed with patient: Yes  Patient's spiritual, cultural and educational needs considered and patient is agreeable to the plan of care and goals as stated below:     Anticipated Barriers for therapy: work    Medical Necessity is demonstrated by the following  History  Co-morbidities and personal factors that may impact the plan of care Co-morbidities:   See medical history    Personal Factors:   no deficits     low   Examination  Body Structures and Functions, activity limitations and participation restrictions that may impact the plan of care Body Regions:   lower extremities    Body Systems:    gross symmetry  ROM  strength  gross coordinated movement  balance  gait  motor control    Participation Restrictions:   Walking and standing, 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 Pt will be compliant with HEP to supplement PT in restoring pain free function.  2. Pt will improve single balance to 10 sec to improve balance functional community ambulation  3. Pt will improve impaired LE MMTs by 1/2 grade  to improve strength for functional tasks  4. Pt improve impaired ankle ROM by 5 deg in all planes to improve mobility for normal movement patterns.   Long Term Goals (10 Weeks):  1. Pt will improve FOTO score to </= 37% limited to decrease perceived limitation with mobility  2. Pt will improve single leg balance to >15s improve single leg balance for functional community ambulation  3. Pt will improve impaired LE MMTs by 1 grade to improve strength for functional tasks.  4. Pt improve impaired ankle ROM to WNL in all planes to improve mobility for normal movement patterns.        PLAN   Plan of care Certification: 4/11/2023 to 6/20/2023.    Outpatient Physical Therapy 1-2 times weekly for 10 weeks to include the following interventions: Gait Training, Manual Therapy, Neuromuscular Re-ed, Patient Education, Therapeutic Activities, and Therapeutic Exercise.     Glenn Gallo, PT, DPT      I CERTIFY THE NEED FOR THESE SERVICES FURNISHED UNDER THIS PLAN OF TREATMENT AND WHILE UNDER MY CARE   Physician's comments:     Physician's Signature: ___________________________________________________

## 2023-04-17 ENCOUNTER — CLINICAL SUPPORT (OUTPATIENT)
Dept: REHABILITATION | Facility: HOSPITAL | Age: 23
End: 2023-04-17
Payer: MEDICAID

## 2023-04-17 DIAGNOSIS — M25.671 DECREASED RANGE OF MOTION OF RIGHT ANKLE: Primary | ICD-10-CM

## 2023-04-17 DIAGNOSIS — R29.898 DECREASED STRENGTH OF LOWER EXTREMITY: ICD-10-CM

## 2023-04-17 PROCEDURE — 97110 THERAPEUTIC EXERCISES: CPT

## 2023-04-17 NOTE — PROGRESS NOTES
[facility]   Physical Therapy Treatment Note     Name: Rosita Dyson  [facility] Number: [id number]    Therapy Diagnosis:   Encounter Diagnoses   Name Primary?    Decreased range of motion of right ankle Yes    Decreased strength of lower extremity      Physician: Gadiel Barcenas MD    Visit Date: 4/17/2023    Physician Orders: PT Eval and Treat   Medical Diagnosis from Referral: S99.911D (ICD-10-CM) - Injury of right ankle, subsequent encounter  Evaluation Date: 4/11/2023  Authorization Period Expiration: 12/31/2023  Plan of Care Expiration: 6/20/2023  Progress Note Due: 5/11/2023  Visit # / Visits authorized: 1/ 20  FOTO: 0/3     Precautions: Standard      Time In: 6:50  Time Out: 7:49  Total Appointment Time (timed & untimed codes): 59 minutes    FOTO 1st:   FOTO 3rd:  FOTO 10th:      SUBJECTIVE     Pt reports: She is feeling good with only slight pain. She is not having the tingling feeling any more.    She was compliant with home exercise program.  Response to previous treatment: Decreased symptoms   Functional change: Progressing    Pain: 2/10  Location: right ankle     OBJECTIVE     Objective Measures updated at progress report unless specified.     + adverse neural tension for tibia nerve    Treatment       Viola received the treatments listed below:      Therapeutic exercises to develop strength, endurance, ROM, and flexibility for 47 minutes including:     Bike 7 minutes   Tibial Nerve floss 25x  Half kneeling ankle dorsiflexion 20x 3s holds - arch support  Arch doming 2x 10x5s holds  Ankle 4 way without resistance 2x15  Bridges 2x12 - with leg kick  Clams 2x10 5s holds  90/90 2x10 3s holds  Towel Curls 2x20 x 3s holds  Single leg balance 4x30s holds      Manual therapy techniques: Joint mobilizations were applied to the: Right ankle for 12 minutes, including:    Posterior glide of talus Grade IV  Distraction Grade III        Patient Education and Home Exercises     Home Exercises  Provided and Patient Education Provided     Education provided:   - HEP  - Role of PT    Written Home Exercises Provided: yes. Exercises were reviewed and Viola was able to demonstrate them prior to the end of the session.  Viola demonstrated good  understanding of the education provided. See EMR under Patient Instructions for exercises provided during therapy sessions    ASSESSMENT     Viola presented wit physical therapy with decreased sympomts. She was challenged with lower extremity strengthening exercises. She was progressed with ankle stability exercises and required verbal and tactile cues to perform the correctly. Pt had frequent episodes of her ankle colpasing and needing support.  Plan to progress patient as tolerated    Viola Is progressing well towards her goals.     Pt prognosis is Good.     Pt will continue to benefit from skilled outpatient physical therapy to address the deficits listed in the problem list box on initial evaluation, provide pt/family education and to maximize pt's level of independence in the home and community environment.     Pt's spiritual, cultural and educational needs considered and pt agreeable to plan of care and goals.     Anticipated Barriers for therapy: work        Goals:     Short Term Goals (4 Weeks):   1. Pt will be compliant with HEP to supplement PT in restoring pain free function. (Progressing)  2. Pt will improve single balance to 10 sec to improve balance functional community ambulation (Progressing)  3. Pt will improve impaired LE MMTs by 1/2 grade  to improve strength for functional tasks (Progressing)  4. Pt improve impaired ankle ROM by 5 deg in all planes to improve mobility for normal movement patterns. (Progressing)   Long Term Goals (10 Weeks):  1. Pt will improve FOTO score to </= 37% limited to decrease perceived limitation with mobility  2. Pt will improve single leg balance to >15s improve single leg balance for functional community ambulation  3. Pt  will improve impaired LE MMTs by 1 grade to improve strength for functional tasks.  4. Pt improve impaired ankle ROM to WNL in all planes to improve mobility for normal movement patterns.          PLAN   Plan of care Certification: 4/11/2023 to 6/20/2023.    Continue with physical therapy plan of care with emphasis on ankle mobility and stability    Glenn Gallo, PT, DPT

## 2023-04-20 ENCOUNTER — CLINICAL SUPPORT (OUTPATIENT)
Dept: REHABILITATION | Facility: HOSPITAL | Age: 23
End: 2023-04-20
Payer: MEDICAID

## 2023-04-20 DIAGNOSIS — M25.671 DECREASED RANGE OF MOTION OF RIGHT ANKLE: Primary | ICD-10-CM

## 2023-04-20 DIAGNOSIS — R29.898 DECREASED STRENGTH OF LOWER EXTREMITY: ICD-10-CM

## 2023-04-20 PROCEDURE — 97110 THERAPEUTIC EXERCISES: CPT

## 2023-04-20 NOTE — PROGRESS NOTES
[facility]   Physical Therapy Treatment Note     Name: Rosita Dyson  [facility] Number: [id number]    Therapy Diagnosis:   Encounter Diagnoses   Name Primary?    Decreased range of motion of right ankle Yes    Decreased strength of lower extremity        Physician: Gadiel Barcenas MD    Visit Date: 4/20/2023    Physician Orders: PT Eval and Treat   Medical Diagnosis from Referral: S99.911D (ICD-10-CM) - Injury of right ankle, subsequent encounter  Evaluation Date: 4/11/2023  Authorization Period Expiration: 12/31/2023  Plan of Care Expiration: 6/20/2023  Progress Note Due: 5/11/2023  Visit # / Visits authorized: 2/ 20  FOTO: 0/3     Precautions: Standard      Time In: 1:55  Time Out: 2:54  Total Appointment Time (timed & untimed codes): 59 minutes    FOTO 1st:   FOTO 3rd:  FOTO 10th:      SUBJECTIVE     Pt reports: She has been having a little tingling in her posterior ankle and pain with walking after hitting her ankle with week  She was compliant with home exercise program.  Response to previous treatment: Decreased symptoms   Functional change: Progressing    Pain: 4/10  Location: right ankle     OBJECTIVE     Objective Measures updated at progress report unless specified.     + adverse neural tension for tibia nerve and common fibular    Treatment       Viola received the treatments listed below:      Therapeutic exercises to develop strength, endurance, ROM, and flexibility for 38 minutes including:     Assessment as above  Bike 7 minutes   Tibial Nerve floss 25x  Half kneeling ankle dorsiflexion 20x 3s holds - arch support - pain  Flexor hallis longus isometcis 5x15s holds  Soleus isometric with 10# 5x15s holds x 2  Arch doming 2x 10x5s holds      Ankle 4 way without resistance 2x15  Bridges 2x12 - with leg kick  Clams 2x10 5s holds  90/90 2x10 3s holds  Towel Curls 2x20 x 3s holds  Single leg balance 4x30s holds      Manual therapy techniques: Joint mobilizations were applied to the:  Right ankle for 21 minutes, including:    Posterior glide of talus Grade IV  Distraction Grade III  Talocrural distraction Manipulation Grade V  Thoracic manipulation Grade V      Patient Education and Home Exercises     Home Exercises Provided and Patient Education Provided     Education provided:   - HEP  - Role of PT    Written Home Exercises Provided: yes. Exercises were reviewed and Viola was able to demonstrate them prior to the end of the session.  Viola demonstrated good  understanding of the education provided. See EMR under Patient Instructions for exercises provided during therapy sessions    ASSESSMENT     Viola presented to physical therapy with increased irritability after hitting her ankle. Isometric intervention seemed to help to most with decreasing her symptoms as previous interventions were painful. She was instructed to perform isometrics at home and monitor pain until next visit. Plan to progress patient as tolerated    Viola Is progressing well towards her goals.     Pt prognosis is Good.     Pt will continue to benefit from skilled outpatient physical therapy to address the deficits listed in the problem list box on initial evaluation, provide pt/family education and to maximize pt's level of independence in the home and community environment.     Pt's spiritual, cultural and educational needs considered and pt agreeable to plan of care and goals.     Anticipated Barriers for therapy: work        Goals:     Short Term Goals (4 Weeks):   1. Pt will be compliant with HEP to supplement PT in restoring pain free function. (Progressing)  2. Pt will improve single balance to 10 sec to improve balance functional community ambulation (Progressing)  3. Pt will improve impaired LE MMTs by 1/2 grade  to improve strength for functional tasks (Progressing)  4. Pt improve impaired ankle ROM by 5 deg in all planes to improve mobility for normal movement patterns. (Progressing)   Long Term Goals (10  Weeks):  1. Pt will improve FOTO score to </= 37% limited to decrease perceived limitation with mobility  2. Pt will improve single leg balance to >15s improve single leg balance for functional community ambulation  3. Pt will improve impaired LE MMTs by 1 grade to improve strength for functional tasks.  4. Pt improve impaired ankle ROM to WNL in all planes to improve mobility for normal movement patterns.          PLAN   Plan of care Certification: 4/11/2023 to 6/20/2023.    Continue with physical therapy plan of care with emphasis on ankle mobility and stability    Glenn Gallo, PT, DPT

## 2023-04-24 ENCOUNTER — CLINICAL SUPPORT (OUTPATIENT)
Dept: REHABILITATION | Facility: HOSPITAL | Age: 23
End: 2023-04-24
Payer: MEDICAID

## 2023-04-24 DIAGNOSIS — R29.898 DECREASED STRENGTH OF LOWER EXTREMITY: ICD-10-CM

## 2023-04-24 DIAGNOSIS — M25.671 DECREASED RANGE OF MOTION OF RIGHT ANKLE: Primary | ICD-10-CM

## 2023-04-24 PROCEDURE — 97110 THERAPEUTIC EXERCISES: CPT

## 2023-04-24 NOTE — PROGRESS NOTES
[facility]   Physical Therapy Treatment Note     Name: Rosita Dyson  [facility] Number: [id number]    Therapy Diagnosis:   Encounter Diagnoses   Name Primary?    Decreased range of motion of right ankle Yes    Decreased strength of lower extremity          Physician: Gadiel Barcenas MD    Visit Date: 4/24/2023    Physician Orders: PT Eval and Treat   Medical Diagnosis from Referral: S99.911D (ICD-10-CM) - Injury of right ankle, subsequent encounter  Evaluation Date: 4/11/2023  Authorization Period Expiration: 12/31/2023  Plan of Care Expiration: 6/20/2023  Progress Note Due: 5/11/2023  Visit # / Visits authorized: 3/ 20  FOTO: 0/3     Precautions: Standard      Time In: 3:02  Time Out: 3:59  Total Appointment Time (timed & untimed codes): 57 minutes    FOTO 1st:   FOTO 3rd:  FOTO 10th:      SUBJECTIVE     Pt reports: She has been having the same ankle pain she was last time    She was compliant with home exercise program.  Response to previous treatment: Decreased symptoms   Functional change: Progressing    Pain: 4/10  Location: right ankle     OBJECTIVE     Objective Measures updated at progress report unless specified.     + adverse neural tension for tibia nerve and common fibular    Treatment       Viola received the treatments listed below:      Therapeutic exercises to develop strength, endurance, ROM, and flexibility for 41 minutes including:     Assessment as above  Seated Tibial Nerve floss 25x  Half kneeling ankle dorsiflexion 20x 3s holds  Seated Dorsiflexion slide backs 20x  Flexor hallis longus isometrics 5x15s holds  Soleus isometric with 10# 5x15s holds x 2  Seated Arch doming 2x 10x5s holds  Standing Arch doming 2x 10x5s holds  Ankle 4 way YTB 2x15  Clams 2x10 5s holds YTB  Toe yoga 2x10      Not today    Bridges 2x12 - with leg kick  90/90 2x10 3s holds  Towel Curls 2x20 x 3s holds  Single leg balance 4x30s holds      Manual therapy techniques: Joint mobilizations were  applied to the: Right ankle for 16 minutes, including:    Posterior glide of talus Grade IV  Distraction Grade III  Talocrural distraction Manipulation Grade V  Thoracic manipulation Grade V      Patient Education and Home Exercises     Home Exercises Provided and Patient Education Provided     Education provided:   - HEP  - Role of PT    Written Home Exercises Provided: yes. Exercises were reviewed and Viola was able to demonstrate them prior to the end of the session.  Viola demonstrated good  understanding of the education provided. See EMR under Patient Instructions for exercises provided during therapy sessions    ASSESSMENT     Viola continues to present with ankle pain and adverse neural tension. She was educated to be gentle with stretching and not to focus more ROM. Her symptoms are most prominent with ambulation and displays dynamic midfoot valgus. She needs to further develop her foot intrinsic strength in order to ambulate pain free.     Viola Is progressing well towards her goals.     Pt prognosis is Good.     Pt will continue to benefit from skilled outpatient physical therapy to address the deficits listed in the problem list box on initial evaluation, provide pt/family education and to maximize pt's level of independence in the home and community environment.     Pt's spiritual, cultural and educational needs considered and pt agreeable to plan of care and goals.     Anticipated Barriers for therapy: work        Goals:     Short Term Goals (4 Weeks):   1. Pt will be compliant with HEP to supplement PT in restoring pain free function. (Progressing)  2. Pt will improve single balance to 10 sec to improve balance functional community ambulation (Progressing)  3. Pt will improve impaired LE MMTs by 1/2 grade  to improve strength for functional tasks (Progressing)  4. Pt improve impaired ankle ROM by 5 deg in all planes to improve mobility for normal movement patterns. (Progressing)   Long Term Goals (10  Weeks):  1. Pt will improve FOTO score to </= 37% limited to decrease perceived limitation with mobility  2. Pt will improve single leg balance to >15s improve single leg balance for functional community ambulation  3. Pt will improve impaired LE MMTs by 1 grade to improve strength for functional tasks.  4. Pt improve impaired ankle ROM to WNL in all planes to improve mobility for normal movement patterns.          PLAN   Plan of care Certification: 4/11/2023 to 6/20/2023.    Continue with physical therapy plan of care with emphasis on ankle mobility and stability    Glenn Gallo, PT, DPT

## 2023-04-27 ENCOUNTER — CLINICAL SUPPORT (OUTPATIENT)
Dept: REHABILITATION | Facility: HOSPITAL | Age: 23
End: 2023-04-27
Payer: MEDICAID

## 2023-04-27 DIAGNOSIS — R29.898 DECREASED STRENGTH OF LOWER EXTREMITY: ICD-10-CM

## 2023-04-27 DIAGNOSIS — M25.671 DECREASED RANGE OF MOTION OF RIGHT ANKLE: Primary | ICD-10-CM

## 2023-04-27 PROCEDURE — 97110 THERAPEUTIC EXERCISES: CPT

## 2023-04-27 NOTE — PROGRESS NOTES
[facility]   Physical Therapy Treatment Note     Name: Rosita Dyson  [facility] Number: [id number]    Therapy Diagnosis:   Encounter Diagnoses   Name Primary?    Decreased range of motion of right ankle Yes    Decreased strength of lower extremity          Physician: Gadiel Barcenas MD    Visit Date: 4/27/2023    Physician Orders: PT Eval and Treat   Medical Diagnosis from Referral: S99.911D (ICD-10-CM) - Injury of right ankle, subsequent encounter  Evaluation Date: 4/11/2023  Authorization Period Expiration: 12/31/2023  Plan of Care Expiration: 6/20/2023  Progress Note Due: 5/11/2023  Visit # / Visits authorized: 4/ 20  FOTO: 0/3     Precautions: Standard      Time In: 4:46  Time Out: 5:45  Total Appointment Time (timed & untimed codes): 59 minutes    FOTO 1st:   FOTO 3rd:  FOTO 10th:      SUBJECTIVE     Pt reports: She is feeling better than she was last session    She was compliant with home exercise program.  Response to previous treatment: Decreased symptoms   Functional change: Progressing    Pain: 2/10  Location: right ankle     OBJECTIVE     Objective Measures updated at progress report unless specified.     + adverse neural tension for tibia nerve and common fibular    Treatment       Viola received the treatments listed below:      Therapeutic exercises to develop strength, endurance, ROM, and flexibility for 44 minutes including:       Seated Tibial Nerve floss 25x  Half kneeling ankle dorsiflexion 20x 3s holds  Seated Dorsiflexion slide backs 20x  Side lying hip abduction 2x10  Flexor hallis longus isometrics 5x15s holds  Soleus isometric with 10# 5x15s holds x 2  Seated Arch doming 2x 10x5s holds  Standing Arch doming 2x 10x5s holds  Ankle 4 way YTB 2x15  Clams 2x10 5s holds YTB  Toe yoga 2x10  [place]  4x15  Single leg bridges 2x12 - with leg kick  90/90 2x10 3s holds  Towel Curls 2x20 x 3s holds      Not today  Single leg balance 4x30s holds      Manual therapy techniques:  Joint mobilizations were applied to the: Right ankle for 15 minutes, including:    Posterior glide of talus Grade IV  Distraction Grade III  Talocrural distraction Manipulation Grade V        Patient Education and Home Exercises     Home Exercises Provided and Patient Education Provided     Education provided:   - HEP  - Role of PT    Written Home Exercises Provided: yes. Exercises were reviewed and Viola was able to demonstrate them prior to the end of the session.  Viola demonstrated good  understanding of the education provided. See EMR under Patient Instructions for exercises provided during therapy sessions    ASSESSMENT     Viola was progressed with foot instincts and ankle strengthening exercises. She did report pain which seemed to be a cramping sensation at end of the session after performing ankle exercises. She needs to further strengthen her foot to avoid mid foot pronation with ambulation.       Viola Is progressing well towards her goals.     Pt prognosis is Good.     Pt will continue to benefit from skilled outpatient physical therapy to address the deficits listed in the problem list box on initial evaluation, provide pt/family education and to maximize pt's level of independence in the home and community environment.     Pt's spiritual, cultural and educational needs considered and pt agreeable to plan of care and goals.     Anticipated Barriers for therapy: work        Goals:     Short Term Goals (4 Weeks):   1. Pt will be compliant with HEP to supplement PT in restoring pain free function. (Progressing)  2. Pt will improve single balance to 10 sec to improve balance functional community ambulation (Progressing)  3. Pt will improve impaired LE MMTs by 1/2 grade  to improve strength for functional tasks (Progressing)  4. Pt improve impaired ankle ROM by 5 deg in all planes to improve mobility for normal movement patterns. (Progressing)   Long Term Goals (10 Weeks):  1. Pt will improve FOTO score to  </= 37% limited to decrease perceived limitation with mobility  2. Pt will improve single leg balance to >15s improve single leg balance for functional community ambulation  3. Pt will improve impaired LE MMTs by 1 grade to improve strength for functional tasks.  4. Pt improve impaired ankle ROM to WNL in all planes to improve mobility for normal movement patterns.          PLAN   Plan of care Certification: 4/11/2023 to 6/20/2023.    Continue with physical therapy plan of care with emphasis on ankle mobility and stability    Glenn Gallo, PT, DPT

## 2023-05-01 ENCOUNTER — CLINICAL SUPPORT (OUTPATIENT)
Dept: REHABILITATION | Facility: HOSPITAL | Age: 23
End: 2023-05-01
Payer: MEDICAID

## 2023-05-01 DIAGNOSIS — R29.898 DECREASED STRENGTH OF LOWER EXTREMITY: ICD-10-CM

## 2023-05-01 DIAGNOSIS — M25.671 DECREASED RANGE OF MOTION OF RIGHT ANKLE: Primary | ICD-10-CM

## 2023-05-01 PROCEDURE — 97110 THERAPEUTIC EXERCISES: CPT

## 2023-05-01 NOTE — PROGRESS NOTES
[facility]   Physical Therapy Treatment Note     Name: Rosita Dyson  [facility] Number: [id number]    Therapy Diagnosis:   Encounter Diagnoses   Name Primary?    Decreased range of motion of right ankle Yes    Decreased strength of lower extremity          Physician: Gadiel Barcenas MD    Visit Date: 5/1/2023    Physician Orders: PT Eval and Treat   Medical Diagnosis from Referral: S99.911D (ICD-10-CM) - Injury of right ankle, subsequent encounter  Evaluation Date: 4/11/2023  Authorization Period Expiration: 12/31/2023  Plan of Care Expiration: 6/20/2023  Progress Note Due: 5/11/2023  Visit # / Visits authorized: 5/ 20  FOTO: 0/3     Precautions: Standard      Time In: 4:01  Time Out:4:57  Total Appointment Time (timed & untimed codes): 56 minutes    FOTO 1st:   FOTO 3rd:  FOTO 10th:      SUBJECTIVE     Pt reports: She is feeling better and having less pain than last time    She was compliant with home exercise program.  Response to previous treatment: Decreased symptoms   Functional change: Progressing    Pain: 1/10  Location: right ankle     OBJECTIVE     Objective Measures updated at progress report unless specified.     + adverse neural tension for tibia nerve and common fibular    Treatment       Viola received the treatments listed below:      Therapeutic exercises to develop strength, endurance, ROM, and flexibility for 56 minutes including:     Seated Tibial Nerve floss 25x  Half kneeling ankle dorsiflexion 20x 3s holds  Seated Dorsiflexion slide backs 20x  Leg press calf raises 3x12 1 cords  Soleus isometric with 10# 5x15s holds x 2  Standing Arch doming 2x 10x5s holds  Ankle 4 way YTB 2x15  Clams 2x10 5s holds YTB  Toe yoga 2x10  [place]  4x15  Single leg bridges 2x12   90/90 2x10 3s holds  Towel Curls 2x20 x 3s holds  Single leg stance with TB under toe 3x30s     Not today    Side lying hip abduction 2x10    Manual therapy techniques: Joint mobilizations were applied to the:  Right ankle for 15 minutes, including:    Posterior glide of talus Grade IV  Distraction Grade III  Talocrural distraction Manipulation Grade V        Patient Education and Home Exercises     Home Exercises Provided and Patient Education Provided     Education provided:   - HEP  - Role of PT    Written Home Exercises Provided: yes. Exercises were reviewed and Viola was able to demonstrate them prior to the end of the session.  Viola demonstrated good  understanding of the education provided. See EMR under Patient Instructions for exercises provided during therapy sessions    ASSESSMENT     Viola continues to be challenged with foot intrinsic exercises. She did have an increase in symptoms with single leg stance with TB under toe exercise, but symptoms went away after rest. Pt needs to continue to strengthening her foot/ ankle while increasing her dorsiflexion.     Viola Is progressing well towards her goals.     Pt prognosis is Good.     Pt will continue to benefit from skilled outpatient physical therapy to address the deficits listed in the problem list box on initial evaluation, provide pt/family education and to maximize pt's level of independence in the home and community environment.     Pt's spiritual, cultural and educational needs considered and pt agreeable to plan of care and goals.     Anticipated Barriers for therapy: work        Goals:     Short Term Goals (4 Weeks):   1. Pt will be compliant with HEP to supplement PT in restoring pain free function. (Progressing)  2. Pt will improve single balance to 10 sec to improve balance functional community ambulation (Progressing)  3. Pt will improve impaired LE MMTs by 1/2 grade  to improve strength for functional tasks (Progressing)  4. Pt improve impaired ankle ROM by 5 deg in all planes to improve mobility for normal movement patterns. (Progressing)   Long Term Goals (10 Weeks):  1. Pt will improve FOTO score to </= 37% limited to decrease perceived  limitation with mobility  2. Pt will improve single leg balance to >15s improve single leg balance for functional community ambulation  3. Pt will improve impaired LE MMTs by 1 grade to improve strength for functional tasks.  4. Pt improve impaired ankle ROM to WNL in all planes to improve mobility for normal movement patterns.          PLAN   Plan of care Certification: 4/11/2023 to 6/20/2023.    Continue with physical therapy plan of care with emphasis on ankle mobility and stability    Glenn Gallo, PT, DPT

## 2023-05-04 ENCOUNTER — CLINICAL SUPPORT (OUTPATIENT)
Dept: REHABILITATION | Facility: HOSPITAL | Age: 23
End: 2023-05-04
Payer: MEDICAID

## 2023-05-04 DIAGNOSIS — R29.898 DECREASED STRENGTH OF LOWER EXTREMITY: ICD-10-CM

## 2023-05-04 DIAGNOSIS — M25.671 DECREASED RANGE OF MOTION OF RIGHT ANKLE: Primary | ICD-10-CM

## 2023-05-04 PROCEDURE — 97110 THERAPEUTIC EXERCISES: CPT

## 2023-05-04 NOTE — PROGRESS NOTES
[facility]   Physical Therapy Treatment Note     Name: Rosita Dyson  [facility] Number: [id number]    Therapy Diagnosis:   Encounter Diagnoses   Name Primary?    Decreased range of motion of right ankle Yes    Decreased strength of lower extremity        Physician: Gadiel Barcenas MD    Visit Date: 5/4/2023    Physician Orders: PT Eval and Treat   Medical Diagnosis from Referral: S99.911D (ICD-10-CM) - Injury of right ankle, subsequent encounter  Evaluation Date: 4/11/2023  Authorization Period Expiration: 12/31/2023  Plan of Care Expiration: 6/20/2023  Progress Note Due: 5/11/2023  Visit # / Visits authorized: 6/ 20  FOTO: 0/3     Precautions: Standard      Time In: 4:01  Time Out:4:58  Total Appointment Time (timed & untimed codes): 57 minutes    FOTO 1st:   FOTO 3rd:  FOTO 10th:      SUBJECTIVE     Pt reports: She is not having as much pain with walking    She was compliant with home exercise program.  Response to previous treatment: Decreased symptoms   Functional change: Progressing    Pain: 1/10  Location: right ankle     OBJECTIVE     Objective Measures updated at progress report unless specified.       Treatment       Viola received the treatments listed below:      Therapeutic exercises to develop strength, endurance, ROM, and flexibility for 45 minutes including:     Seated Tibial Nerve floss 25x  Half kneeling ankle dorsiflexion 20x 3s holds  Leg press calf raises 3x12 1.5 cords  Single leg leg press 3x10 - 1 cord with arch doming  Standing Arch doming 2x 10x5s holds  Ankle 4 way RTB 2x15  Toe flexion YTB 3x10  Toe yoga 20  [place]  4x15  Towel Curls 2x20 x 3s holds  Single leg stance with TB under toe 3x30s       Not today  Clams 2x10 5s holds YTB  Single leg bridges 2x12   90/90 2x10 3s holds  Side lying hip abduction 2x10    Manual therapy techniques: Joint mobilizations were applied to the: Right ankle for 12 minutes, including:    Posterior glide of talus Grade  IV  Distraction Grade III  Talocrural distraction Manipulation Grade V        Patient Education and Home Exercises     Home Exercises Provided and Patient Education Provided     Education provided:   - HEP  - Role of PT    Written Home Exercises Provided: yes. Exercises were reviewed and Viola was able to demonstrate them prior to the end of the session.  Viola demonstrated good  understanding of the education provided. See EMR under Patient Instructions for exercises provided during therapy sessions    ASSESSMENT     Viola was progressed with lower extremity strengthening while maintaining good arch. She reported pain with half kneeling dorsiflexion stretching and with ankle strengthening exercises that went away with rest. Pain seems to be muscular in nature. She is progressing in strength and is the most likely cause for her improvement in pain with walking. Plan to continue to progress as tolerated.     Viola Is progressing well towards her goals.     Pt prognosis is Good.     Pt will continue to benefit from skilled outpatient physical therapy to address the deficits listed in the problem list box on initial evaluation, provide pt/family education and to maximize pt's level of independence in the home and community environment.     Pt's spiritual, cultural and educational needs considered and pt agreeable to plan of care and goals.     Anticipated Barriers for therapy: work        Goals:     Short Term Goals (4 Weeks):   1. Pt will be compliant with HEP to supplement PT in restoring pain free function. (Progressing)  2. Pt will improve single balance to 10 sec to improve balance functional community ambulation (Progressing)  3. Pt will improve impaired LE MMTs by 1/2 grade  to improve strength for functional tasks (Progressing)  4. Pt improve impaired ankle ROM by 5 deg in all planes to improve mobility for normal movement patterns. (Progressing)   Long Term Goals (10 Weeks):  1. Pt will improve FOTO score to  </= 37% limited to decrease perceived limitation with mobility  2. Pt will improve single leg balance to >15s improve single leg balance for functional community ambulation  3. Pt will improve impaired LE MMTs by 1 grade to improve strength for functional tasks.  4. Pt improve impaired ankle ROM to WNL in all planes to improve mobility for normal movement patterns.          PLAN   Plan of care Certification: 4/11/2023 to 6/20/2023.    Continue with physical therapy plan of care with emphasis on ankle mobility and stability    Glenn Gallo, PT, DPT

## 2023-05-08 ENCOUNTER — CLINICAL SUPPORT (OUTPATIENT)
Dept: REHABILITATION | Facility: HOSPITAL | Age: 23
End: 2023-05-08
Payer: MEDICAID

## 2023-05-08 DIAGNOSIS — M25.671 DECREASED RANGE OF MOTION OF RIGHT ANKLE: Primary | ICD-10-CM

## 2023-05-08 DIAGNOSIS — R29.898 DECREASED STRENGTH OF LOWER EXTREMITY: ICD-10-CM

## 2023-05-08 PROCEDURE — 97110 THERAPEUTIC EXERCISES: CPT

## 2023-05-08 NOTE — PROGRESS NOTES
[facility]   Physical Therapy Treatment Note     Name: Rosita Dyson  [facility] Number: [id number]    Therapy Diagnosis:   Encounter Diagnoses   Name Primary?    Decreased range of motion of right ankle Yes    Decreased strength of lower extremity          Physician: Gadiel Barcenas MD    Visit Date: 5/8/2023    Physician Orders: PT Eval and Treat   Medical Diagnosis from Referral: S99.911D (ICD-10-CM) - Injury of right ankle, subsequent encounter  Evaluation Date: 4/11/2023  Authorization Period Expiration: 12/31/2023  Plan of Care Expiration: 6/20/2023  Progress Note Due: 5/11/2023  Visit # / Visits authorized: 6/ 20  FOTO: 0/3     Precautions: Standard      Time In: 3:58  Time Out: 4:56  Total Appointment Time (timed & untimed codes): 58 minutes    FOTO 1st:   FOTO 3rd:  FOTO 10th:      SUBJECTIVE     Pt reports: She is feeling better today. She was sore after last session during the weekend    She was compliant with home exercise program.  Response to previous treatment: Decreased symptoms   Functional change: Progressing    Pain: 1/10  Location: right ankle     OBJECTIVE     Objective Measures updated at progress report unless specified.       Treatment       Viola received the treatments listed below:      Therapeutic exercises to develop strength, endurance, ROM, and flexibility for 42 minutes including:     Supine Tibial Nerve floss 25x  Half kneeling ankle dorsiflexion 20x 3s holds  Ankle 4 way RTB 2x15  Toe flexion YTB 3x10  [place]  4x15  Towel Curls 2x20 x 3s holds  Single leg stance with foam 3x1 min  Single leg stance with ball re bounder 3x15 Yellow   Leg press calf raises 3x12 2 cords    Not today  Single leg leg press 3x10 - 1 cord with arch doming  Standing Arch doming 2x 10x5s holds  Toe yoga 20  Clams 2x10 5s holds YTB  Single leg bridges 2x12   90/90 2x10 3s holds  Side lying hip abduction 2x10    Manual therapy techniques: Joint mobilizations were applied to the:  Right ankle for 16 minutes, including:    Posterior glide of talus Grade IV  Distraction Grade III  Talocrural distraction Manipulation Grade V        Patient Education and Home Exercises     Home Exercises Provided and Patient Education Provided     Education provided:   - HEP  - Role of PT    Written Home Exercises Provided: yes. Exercises were reviewed and Viola was able to demonstrate them prior to the end of the session.  Viola demonstrated good  understanding of the education provided. See EMR under Patient Instructions for exercises provided during therapy sessions    ASSESSMENT     Viola was progressed with balance and stability exercise and tolerated it well. Her ankle ROM is improving, but continued to be limited. Pt was educated to continue to perform flossing exercises to decreased adverse neural symptoms in her ankle. Plan to progress patient as tolerated to return to prior level of function.        Viola Is progressing well towards her goals.     Pt prognosis is Good.     Pt will continue to benefit from skilled outpatient physical therapy to address the deficits listed in the problem list box on initial evaluation, provide pt/family education and to maximize pt's level of independence in the home and community environment.     Pt's spiritual, cultural and educational needs considered and pt agreeable to plan of care and goals.     Anticipated Barriers for therapy: work        Goals:     Short Term Goals (4 Weeks):   1. Pt will be compliant with HEP to supplement PT in restoring pain free function. (Progressing)  2. Pt will improve single balance to 10 sec to improve balance functional community ambulation (Progressing)  3. Pt will improve impaired LE MMTs by 1/2 grade  to improve strength for functional tasks (Progressing)  4. Pt improve impaired ankle ROM by 5 deg in all planes to improve mobility for normal movement patterns. (Progressing)   Long Term Goals (10 Weeks):  1. Pt will improve FOTO score  to </= 37% limited to decrease perceived limitation with mobility  2. Pt will improve single leg balance to >15s improve single leg balance for functional community ambulation  3. Pt will improve impaired LE MMTs by 1 grade to improve strength for functional tasks.  4. Pt improve impaired ankle ROM to WNL in all planes to improve mobility for normal movement patterns.          PLAN   Plan of care Certification: 4/11/2023 to 6/20/2023.    Continue with physical therapy plan of care with emphasis on ankle mobility and stability    Glenn Gallo, PT, DPT

## 2023-05-15 ENCOUNTER — CLINICAL SUPPORT (OUTPATIENT)
Dept: REHABILITATION | Facility: HOSPITAL | Age: 23
End: 2023-05-15
Payer: MEDICAID

## 2023-05-15 DIAGNOSIS — R29.898 DECREASED STRENGTH OF LOWER EXTREMITY: ICD-10-CM

## 2023-05-15 DIAGNOSIS — M25.671 DECREASED RANGE OF MOTION OF RIGHT ANKLE: Primary | ICD-10-CM

## 2023-05-15 PROCEDURE — 97110 THERAPEUTIC EXERCISES: CPT

## 2023-05-15 NOTE — PROGRESS NOTES
[facility]   Physical Therapy Treatment Note     Name: Rosita Dyson  [facility] Number: [id number]    Therapy Diagnosis:   Encounter Diagnoses   Name Primary?    Decreased range of motion of right ankle Yes    Decreased strength of lower extremity        Physician: Gadiel Barcenas MD    Visit Date: 5/15/2023    Physician Orders: PT Eval and Treat   Medical Diagnosis from Referral: S99.911D (ICD-10-CM) - Injury of right ankle, subsequent encounter  Evaluation Date: 4/11/2023  Authorization Period Expiration: 12/31/2023  Plan of Care Expiration: 6/20/2023  Progress Note Due: 5/11/2023  Visit # / Visits authorized: 8/ 20  FOTO: 0/3     Precautions: Standard      Time In: 3:50  Time Out: 4:45  Total Appointment Time (timed & untimed codes): 55 minutes    FOTO 1st:   FOTO 3rd:  FOTO 10th:      SUBJECTIVE     Pt reports: She is feeling a lot better today    She was compliant with home exercise program.  Response to previous treatment: Decreased symptoms   Functional change: Progressing    Pain: 0/10  Location: right ankle     OBJECTIVE     Objective Measures updated at progress report unless specified.      Ankle Right Left     PROM PROM   Plantarflexion 60 60   Dorsiflexion 15 15   Inversion 35 35   Eversion 25 25         Treatment       Viola received the treatments listed below:      Therapeutic exercises to develop strength, endurance, ROM, and flexibility for 47 minutes including:     Assessment as above  Leg press calf raises 3x15 2 cords  Leg Press calf raise with eccentric 3x6 cords  Single leg stance with foam 3x1 min  Single leg stance with ball re bounder 3x15 Yellow   Sneaky Lunge 2x10x 5s holds  Standing Heel raises 2x20  Standing Arch doming 2x 10x5s holds  Ankle 4 way BTB 2x15  Toe flexion BTB 3x10      Not today  Supine Tibial Nerve floss 25x  Half kneeling ankle dorsiflexion 20x 3s holds  [place]  4x15  Towel Curls 2x20 x 3s holds  Toe yoga 20  Clams 2x10 5s holds YTB  Single  leg bridges 2x12   90/90 2x10 3s holds  Side lying hip abduction 2x10    Manual therapy techniques: Joint mobilizations were applied to the: Right ankle for 8 minutes, including:    Posterior glide of talus Grade IV  Distraction Grade III  Talocrural distraction Manipulation Grade V        Patient Education and Home Exercises     Home Exercises Provided and Patient Education Provided     Education provided:   - HEP  - Role of PT    Written Home Exercises Provided: yes. Exercises were reviewed and Viola was able to demonstrate them prior to the end of the session.  Viola demonstrated good  understanding of the education provided. See EMR under Patient Instructions for exercises provided during therapy sessions    ASSESSMENT     Viola was able to demonstrate improvements with increase ankle range of motion. She was progressed to lower leg strengthening and balance exercises. Plan to continue to progress patient with emphasis on return to prior level of function. Pt needs to further strengthening her lower leg.       Viola Is progressing well towards her goals.     Pt prognosis is Good.     Pt will continue to benefit from skilled outpatient physical therapy to address the deficits listed in the problem list box on initial evaluation, provide pt/family education and to maximize pt's level of independence in the home and community environment.     Pt's spiritual, cultural and educational needs considered and pt agreeable to plan of care and goals.     Anticipated Barriers for therapy: work        Goals:     Short Term Goals (4 Weeks):   1. Pt will be compliant with HEP to supplement PT in restoring pain free function. (Progressing)  2. Pt will improve single balance to 10 sec to improve balance functional community ambulation (Progressing)  3. Pt will improve impaired LE MMTs by 1/2 grade  to improve strength for functional tasks (Progressing)  4. Pt improve impaired ankle ROM by 5 deg in all planes to improve mobility  for normal movement patterns. (Progressing)   Long Term Goals (10 Weeks):  1. Pt will improve FOTO score to </= 37% limited to decrease perceived limitation with mobility  2. Pt will improve single leg balance to >15s improve single leg balance for functional community ambulation  3. Pt will improve impaired LE MMTs by 1 grade to improve strength for functional tasks.  4. Pt improve impaired ankle ROM to WNL in all planes to improve mobility for normal movement patterns.          PLAN   Plan of care Certification: 4/11/2023 to 6/20/2023.    Continue with physical therapy plan of care with emphasis on ankle mobility and stability    Glenn Gallo, PT, DPT

## 2023-05-22 ENCOUNTER — CLINICAL SUPPORT (OUTPATIENT)
Dept: REHABILITATION | Facility: HOSPITAL | Age: 23
End: 2023-05-22
Payer: MEDICAID

## 2023-05-22 DIAGNOSIS — M25.671 DECREASED RANGE OF MOTION OF RIGHT ANKLE: Primary | ICD-10-CM

## 2023-05-22 DIAGNOSIS — R29.898 DECREASED STRENGTH OF LOWER EXTREMITY: ICD-10-CM

## 2023-05-22 PROCEDURE — 97110 THERAPEUTIC EXERCISES: CPT

## 2023-05-22 NOTE — PROGRESS NOTES
[facility]   Physical Therapy Treatment Note     Name: Rosita Dyson  [facility] Number: [id number]    Therapy Diagnosis:   Encounter Diagnoses   Name Primary?    Decreased range of motion of right ankle Yes    Decreased strength of lower extremity          Physician: Gadiel Barcenas MD    Visit Date: 5/22/2023    Physician Orders: PT Eval and Treat   Medical Diagnosis from Referral: S99.911D (ICD-10-CM) - Injury of right ankle, subsequent encounter  Evaluation Date: 4/11/2023  Authorization Period Expiration: 12/31/2023  Plan of Care Expiration: 6/20/2023  Progress Note Due: 5/11/2023  Visit # / Visits authorized: 9/ 20  FOTO: 0/3     Precautions: Standard      Time In: 8:00  Time Out: 8:58  Total Appointment Time (timed & untimed codes): 58 minutes    FOTO 1st:   FOTO 3rd:  FOTO 10th:      SUBJECTIVE     Pt reports: She rolled her ankle over the weekend and is unsure why. She was a little sore but not too bad today    She was compliant with home exercise program.  Response to previous treatment: Decreased symptoms   Functional change: Progressing    Pain: 2/10  Location: right ankle     OBJECTIVE     Objective Measures updated at progress report unless specified.      Ankle Right Left     PROM PROM   Plantarflexion 60 60   Dorsiflexion 15 15   Inversion 35 35   Eversion 25 25         Treatment       Viola received the treatments listed below:      Therapeutic exercises to develop strength, endurance, ROM, and flexibility for 53 minutes including:       Ankle 4 way BTB 2x15  SL rebound 3x15/ 3x15 with Blue pad, Yellow Ball  Sneaky Lunge 2x10x 5s holds  4 way cone taps 2x10 each  Standing Arch doming 2x 10x5s holds  Standing Heel raises 2x20  Clams 2x10 5s holds GTB  Single leg bridges 2x10  Leg press calf raises 3x15 2 cords  Leg Press calf raise with eccentric 3x6 cords  Single leg stance with foam 3x1 min  Single leg stance with ball re bounder 3x15 Yellow   Toe flexion BTB 3x10      Not  today  Supine Tibial Nerve floss 25x  Half kneeling ankle dorsiflexion 20x 3s holds  [place]  4x15  Towel Curls 2x20 x 3s holds  Toe yoga 20  Single leg bridges 2x12   90/90 2x10 3s holds  Side lying hip abduction 2x10    Manual therapy techniques: Joint mobilizations were applied to the: Right ankle for 5 minutes, including:    Posterior glide of talus Grade IV  Distraction Grade III  Talocrural distraction Manipulation Grade V        Patient Education and Home Exercises     Home Exercises Provided and Patient Education Provided     Education provided:   - HEP  - Role of PT    Written Home Exercises Provided: yes. Exercises were reviewed and Viola was able to demonstrate them prior to the end of the session.  Viola demonstrated good  understanding of the education provided. See EMR under Patient Instructions for exercises provided during therapy sessions    ASSESSMENT     Viola was progressed with greater balance exercises. She had difficulty with posterior medial cone taps. She did complain of slight ankle pain that was alleviated with arch doming exercises. Pt needs to continue to increase her ankle strength and balance to avoid ankle inversion injury. She was educate on the use and benefits of ankle bracing.       Viola Is progressing well towards her goals.     Pt prognosis is Good.     Pt will continue to benefit from skilled outpatient physical therapy to address the deficits listed in the problem list box on initial evaluation, provide pt/family education and to maximize pt's level of independence in the home and community environment.     Pt's spiritual, cultural and educational needs considered and pt agreeable to plan of care and goals.     Anticipated Barriers for therapy: work        Goals:     Short Term Goals (4 Weeks):   1. Pt will be compliant with HEP to supplement PT in restoring pain free function. (Progressing)  2. Pt will improve single balance to 10 sec to improve balance functional  community ambulation (Progressing)  3. Pt will improve impaired LE MMTs by 1/2 grade  to improve strength for functional tasks (Progressing)  4. Pt improve impaired ankle ROM by 5 deg in all planes to improve mobility for normal movement patterns. (Progressing)   Long Term Goals (10 Weeks):  1. Pt will improve FOTO score to </= 37% limited to decrease perceived limitation with mobility  2. Pt will improve single leg balance to >15s improve single leg balance for functional community ambulation  3. Pt will improve impaired LE MMTs by 1 grade to improve strength for functional tasks.  4. Pt improve impaired ankle ROM to WNL in all planes to improve mobility for normal movement patterns.          PLAN   Plan of care Certification: 4/11/2023 to 6/20/2023.    Continue with physical therapy plan of care with emphasis on ankle mobility and stability    Glenn Gallo, PT, DPT

## 2023-06-03 ENCOUNTER — PATIENT MESSAGE (OUTPATIENT)
Dept: REHABILITATION | Facility: HOSPITAL | Age: 23
End: 2023-06-03
Payer: MEDICAID

## 2023-06-05 ENCOUNTER — CLINICAL SUPPORT (OUTPATIENT)
Dept: REHABILITATION | Facility: HOSPITAL | Age: 23
End: 2023-06-05
Payer: MEDICAID

## 2023-06-05 DIAGNOSIS — R29.898 DECREASED STRENGTH OF LOWER EXTREMITY: ICD-10-CM

## 2023-06-05 DIAGNOSIS — M25.671 DECREASED RANGE OF MOTION OF RIGHT ANKLE: Primary | ICD-10-CM

## 2023-06-05 PROCEDURE — 97110 THERAPEUTIC EXERCISES: CPT

## 2023-06-05 NOTE — PROGRESS NOTES
[facility]   Physical Therapy Treatment Note     Name: Rosita Dyson  [facility] Number: [id number]    Therapy Diagnosis:   Encounter Diagnoses   Name Primary?    Decreased range of motion of right ankle Yes    Decreased strength of lower extremity          Physician: Gadiel Barcenas MD    Visit Date: 6/5/2023    Physician Orders: PT Eval and Treat   Medical Diagnosis from Referral: S99.911D (ICD-10-CM) - Injury of right ankle, subsequent encounter  Evaluation Date: 4/11/2023  Authorization Period Expiration: 12/31/2023  Plan of Care Expiration: 6/20/2023  Progress Note Due: 5/11/2023  Visit # / Visits authorized: 9/ 20  FOTO: 0/3     Precautions: Standard      Time In: 3:55  Time Out: 4:54  Total Appointment Time (timed & untimed codes): 59 minutes    FOTO 1st:   FOTO 3rd:  FOTO 10th:      SUBJECTIVE     Pt reports: She rolled her ankle again after missing a step coming out of a bar. She did have     She was compliant with home exercise program.  Response to previous treatment: Decreased symptoms   Functional change: Progressing    Pain: 2/10  Location: right ankle     OBJECTIVE     Objective Measures updated at progress report unless specified.      Ankle Right Left     PROM PROM   Plantarflexion 60 60   Dorsiflexion 5 15   Inversion 35 35   Eversion 25 25         Treatment       Viola received the treatments listed below:      Therapeutic exercises to develop strength, endurance, ROM, and flexibility for 51 minutes including:     Half Kneeling dorsiflexion RTB 30x  Ankle 4 way BTB 2x15  SL rebound 3x15/ 3x15 with Blue pad, Yellow Ball  Standing Arch doming 2x 10x5s holds  Standing Heel raises 2x20  Single leg stance with foam 4x1 min  Single leg stance with 10# KB 3x20 circles each  Toe flexion BTB 3x10  Leg press calf raises 3x15 2 cords  Leg Press calf raise with eccentric 3x6 cords  Y balance for treatment 2x10 anterior      Not today  Sneaky Lunge 2x10x 5s holds  4 way cone taps 2x10  each  Clams 2x10 5s holds GTB  Single leg bridges 2x10  Supine Tibial Nerve floss 25x  Half kneeling ankle dorsiflexion 20x 3s holds  [place]  4x15  Towel Curls 2x20 x 3s holds  Toe yoga 20  Single leg bridges 2x12   90/90 2x10 3s holds  Side lying hip abduction 2x10    Manual therapy techniques: Joint mobilizations were applied to the: Right ankle for 8 minutes, including:    Posterior glide of talus Grade IV  Distraction Grade III  Talocrural distraction Manipulation Grade V        Patient Education and Home Exercises     Home Exercises Provided and Patient Education Provided     Education provided:   - HEP  - Role of PT    Written Home Exercises Provided: yes. Exercises were reviewed and Viola was able to demonstrate them prior to the end of the session.  Viola demonstrated good  understanding of the education provided. See EMR under Patient Instructions for exercises provided during therapy sessions    ASSESSMENT     Viola was found to have decreased ankle dorsiflexion and decreased balance on her right ankle. She was very challenged with balance exercises, but did not report pain except for half kneeling stretch. She reported fatigue in lower leg. Plan to progress patient with more balance exercises.       Viola Is progressing well towards her goals.     Pt prognosis is Good.     Pt will continue to benefit from skilled outpatient physical therapy to address the deficits listed in the problem list box on initial evaluation, provide pt/family education and to maximize pt's level of independence in the home and community environment.     Pt's spiritual, cultural and educational needs considered and pt agreeable to plan of care and goals.     Anticipated Barriers for therapy: work        Goals:     Short Term Goals (4 Weeks):   1. Pt will be compliant with HEP to supplement PT in restoring pain free function. (Progressing)  2. Pt will improve single balance to 10 sec to improve balance functional community  ambulation (Progressing)  3. Pt will improve impaired LE MMTs by 1/2 grade  to improve strength for functional tasks (Progressing)  4. Pt improve impaired ankle ROM by 5 deg in all planes to improve mobility for normal movement patterns. (Progressing)   Long Term Goals (10 Weeks):  1. Pt will improve FOTO score to </= 37% limited to decrease perceived limitation with mobility  2. Pt will improve single leg balance to >15s improve single leg balance for functional community ambulation  3. Pt will improve impaired LE MMTs by 1 grade to improve strength for functional tasks.  4. Pt improve impaired ankle ROM to WNL in all planes to improve mobility for normal movement patterns.          PLAN   Plan of care Certification: 4/11/2023 to 6/20/2023.    Continue with physical therapy plan of care with emphasis on ankle mobility and stability    Glenn Gallo, PT, DPT

## 2023-06-12 ENCOUNTER — CLINICAL SUPPORT (OUTPATIENT)
Dept: REHABILITATION | Facility: HOSPITAL | Age: 23
End: 2023-06-12
Payer: MEDICAID

## 2023-06-12 DIAGNOSIS — M25.671 DECREASED RANGE OF MOTION OF RIGHT ANKLE: Primary | ICD-10-CM

## 2023-06-12 DIAGNOSIS — R29.898 DECREASED STRENGTH OF LOWER EXTREMITY: ICD-10-CM

## 2023-06-12 PROCEDURE — 97110 THERAPEUTIC EXERCISES: CPT

## 2023-06-12 NOTE — PROGRESS NOTES
[facility]   Physical Therapy Treatment Note     Name: Rosita Dyson  [facility] Number: [id number]    Therapy Diagnosis:   Encounter Diagnoses   Name Primary?    Decreased range of motion of right ankle Yes    Decreased strength of lower extremity        Physician: Gadiel Barcenas MD    Visit Date: 6/12/2023    Physician Orders: PT Eval and Treat   Medical Diagnosis from Referral: S99.911D (ICD-10-CM) - Injury of right ankle, subsequent encounter  Evaluation Date: 4/11/2023  Authorization Period Expiration: 12/31/2023  Plan of Care Expiration: 6/20/2023  Progress Note Due: 5/11/2023  Visit # / Visits authorized: 11/ 20  FOTO: 0/3     Precautions: Standard      Time In: 4:00  Time Out: 4:55  Total Appointment Time (timed & untimed codes): 55 minutes    FOTO 1st:   FOTO 3rd:  FOTO 10th:      SUBJECTIVE     Pt reports: She is feeling better with less ankle pain    She was compliant with home exercise program.  Response to previous treatment: Decreased symptoms   Functional change: Progressing    Pain: 2/10  Location: right ankle     OBJECTIVE     Objective Measures updated at progress report unless specified.      Ankle Right Left     PROM PROM   Plantarflexion 60 60   Dorsiflexion 5 15   Inversion 35 35   Eversion 25 25       Adverse neural tension of tibial and common fib  Treatment       Viola received the treatments listed below:      Therapeutic exercises to develop strength, endurance, ROM, and flexibility for 44 minutes including:     Nerve floss 25x each  Half Kneeling dorsiflexion RTB 30x  SL rebound 3x15 with Blue pad, Yellow Ball  Standing Arch doming 2x 10x5s holds  Standing Heel raises 2x20  Single leg stance with foam 4x1 min  Single leg stance with 10# KB 3x20 circles each  Toe flexion BTB 3x10  Sneaky Lunge 2x10x 5s holds  Leg press calf raises 3x15 2 cords  Leg Press calf raise with eccentric 3x6 cords  Y balance for treatment 2x10 anterior  Single leg balance with plantarflexion  3x20s      Not today  Ankle 4 way BTB 2x15  4 way cone taps 2x10 each  Clams 2x10 5s holds GTB  Single leg bridges 2x10  Supine Tibial Nerve floss 25x  Half kneeling ankle dorsiflexion 20x 3s holds  [place]  4x15  Towel Curls 2x20 x 3s holds  Toe yoga 20  Single leg bridges 2x12   90/90 2x10 3s holds  Side lying hip abduction 2x10    Manual therapy techniques: Joint mobilizations were applied to the: Right ankle for 11 minutes, including:    Posterior glide of talus Grade IV  Distraction Grade III  Talocrural distraction Manipulation Grade V        Patient Education and Home Exercises     Home Exercises Provided and Patient Education Provided     Education provided:   - HEP  - Role of PT    Written Home Exercises Provided: yes. Exercises were reviewed and Viola was able to demonstrate them prior to the end of the session.  Viola demonstrated good  understanding of the education provided. See EMR under Patient Instructions for exercises provided during therapy sessions    ASSESSMENT     Viola was found to have adverse neural tension in her ankle. She was progressed with balance exercises and ankle strengthening exercises. She did report a little mid foot pain with heel raises. She was educated on the importance of continue to perform her flossing exercises at home. Plan to progress patient with more balance exercises.       Viola Is progressing well towards her goals.     Pt prognosis is Good.     Pt will continue to benefit from skilled outpatient physical therapy to address the deficits listed in the problem list box on initial evaluation, provide pt/family education and to maximize pt's level of independence in the home and community environment.     Pt's spiritual, cultural and educational needs considered and pt agreeable to plan of care and goals.     Anticipated Barriers for therapy: work        Goals:     Short Term Goals (4 Weeks):   1. Pt will be compliant with HEP to supplement PT in restoring pain  free function. (Progressing)  2. Pt will improve single balance to 10 sec to improve balance functional community ambulation (Progressing)  3. Pt will improve impaired LE MMTs by 1/2 grade  to improve strength for functional tasks (Progressing)  4. Pt improve impaired ankle ROM by 5 deg in all planes to improve mobility for normal movement patterns. (Progressing)   Long Term Goals (10 Weeks):  1. Pt will improve FOTO score to </= 37% limited to decrease perceived limitation with mobility  2. Pt will improve single leg balance to >15s improve single leg balance for functional community ambulation  3. Pt will improve impaired LE MMTs by 1 grade to improve strength for functional tasks.  4. Pt improve impaired ankle ROM to WNL in all planes to improve mobility for normal movement patterns.          PLAN   Plan of care Certification: 4/11/2023 to 6/20/2023.    Continue with physical therapy plan of care with emphasis on ankle mobility and stability    Glenn Gallo, PT, DPT

## 2023-06-19 ENCOUNTER — CLINICAL SUPPORT (OUTPATIENT)
Dept: REHABILITATION | Facility: HOSPITAL | Age: 23
End: 2023-06-19
Payer: MEDICAID

## 2023-06-19 DIAGNOSIS — M25.671 DECREASED RANGE OF MOTION OF RIGHT ANKLE: Primary | ICD-10-CM

## 2023-06-19 DIAGNOSIS — R29.898 DECREASED STRENGTH OF LOWER EXTREMITY: ICD-10-CM

## 2023-06-19 PROCEDURE — 97110 THERAPEUTIC EXERCISES: CPT

## 2023-06-19 NOTE — PROGRESS NOTES
[facility]   Physical Therapy Treatment Note     Name: Rosita Dyson  [facility] Number: [id number]    Therapy Diagnosis:   Encounter Diagnoses   Name Primary?    Decreased range of motion of right ankle Yes    Decreased strength of lower extremity          Physician: Gadiel Barcenas MD    Visit Date: 6/19/2023    Physician Orders: PT Eval and Treat   Medical Diagnosis from Referral: S99.911D (ICD-10-CM) - Injury of right ankle, subsequent encounter  Evaluation Date: 4/11/2023  Authorization Period Expiration: 12/31/2023  Plan of Care Expiration: 6/20/2023  Progress Note Due: 5/11/2023  Visit # / Visits authorized: 12/ 20  FOTO: 0/3     Precautions: Standard      Time In: 4:01  Time Out: 5:02  Total Appointment Time (timed & untimed codes): 61 minutes    FOTO 1st:   FOTO 3rd:  FOTO 10th:      SUBJECTIVE     Pt reports: She reports she had a sharp, throbbing pain yesterday without any reason. She said she did not do much this weekend and that she noticed her ankle was sore today.     She was compliant with home exercise program.  Response to previous treatment: Decreased symptoms   Functional change: Progressing    Pain: 6/10  Location: right ankle     OBJECTIVE     Objective Measures updated at progress report unless specified.      Ankle Right Left     PROM PROM   Plantarflexion 60 60   Dorsiflexion 5 15   Inversion 35 35   Eversion 25 25       Adverse neural tension of tibial and common fib  Treatment       Viola received the treatments listed below:      Therapeutic exercises to develop strength, endurance, ROM, and flexibility for 54 minutes including:     Nerve floss 25x each  SL rebound 3x15 with Blue pad, Yellow Ball  Standing Arch doming 2x 10x5s holds  Standing Heel raises 2x20  Single leg stance with foam 4x1 min  Single leg stance with 10# KB 3x20 circles each  2 up 1 down with foam 2x10  Sneaky Lunge 2x10x 5s holds  Single Leg press calf raises 3x20 2.5 cords  Single leg leg press  with an arch dome 3x12  Toe yoga 20    Not today  Y balance for treatment 2x10 anterior  Single leg balance with plantarflexion 3x20s  Toe flexion BTB 3x10  Half Kneeling dorsiflexion RTB 30x  Ankle 4 way BTB 2x15  4 way cone taps 2x10 each  Clams 2x10 5s holds GTB  Single leg bridges 2x10  Supine Tibial Nerve floss 25x  Half kneeling ankle dorsiflexion 20x 3s holds  [place]  4x15  Towel Curls 2x20 x 3s holds  Single leg bridges 2x12   90/90 2x10 3s holds  Side lying hip abduction 2x10    Manual therapy techniques: Joint mobilizations were applied to the: Right ankle for 7 minutes, including:  Lateral mobilization of Subtalar joint Grade IV  Posterior glide of talus Grade IV  Assessment of ankle mobility     Not today:   Distraction Grade III  Talocrural distraction Manipulation Grade V    Patient Education and Home Exercises     Home Exercises Provided and Patient Education Provided     Education provided:   - HEP  - Role of PT    Written Home Exercises Provided: yes. Exercises were reviewed and Viola was able to demonstrate them prior to the end of the session.  Viola demonstrated good  understanding of the education provided. See EMR under Patient Instructions for exercises provided during therapy sessions    ASSESSMENT     Viola came in with ankle soreness and pain from the weekend, but she reported a decrease in symptoms after manual therapy and flossing. She was progressed with ankle strengthening and single leg balance exercises today. She  She did report mid foot cramping with some balance activities which were followed by joanneing to help increase strength in the foot intrinsic muscles. She was educated on the importance of continuing HEP. Plan to progress patient as tolerated.      Viola Is progressing well towards her goals.     Pt prognosis is Good.     Pt will continue to benefit from skilled outpatient physical therapy to address the deficits listed in the problem list box on initial evaluation,  provide pt/family education and to maximize pt's level of independence in the home and community environment.     Pt's spiritual, cultural and educational needs considered and pt agreeable to plan of care and goals.     Anticipated Barriers for therapy: work        Goals:     Short Term Goals (4 Weeks):   1. Pt will be compliant with HEP to supplement PT in restoring pain free function. (Progressing)  2. Pt will improve single balance to 10 sec to improve balance functional community ambulation (Progressing)  3. Pt will improve impaired LE MMTs by 1/2 grade  to improve strength for functional tasks (Progressing)  4. Pt improve impaired ankle ROM by 5 deg in all planes to improve mobility for normal movement patterns. (Progressing)   Long Term Goals (10 Weeks):  1. Pt will improve FOTO score to </= 37% limited to decrease perceived limitation with mobility  2. Pt will improve single leg balance to >15s improve single leg balance for functional community ambulation  3. Pt will improve impaired LE MMTs by 1 grade to improve strength for functional tasks.  4. Pt improve impaired ankle ROM to WNL in all planes to improve mobility for normal movement patterns.          PLAN   Plan of care Certification: 4/11/2023 to 6/20/2023.    Continue with physical therapy plan of care with emphasis on ankle mobility and stability    Glenn Gallo, PT, DPT  mArit Cole, SPT

## 2023-07-03 ENCOUNTER — CLINICAL SUPPORT (OUTPATIENT)
Dept: REHABILITATION | Facility: HOSPITAL | Age: 23
End: 2023-07-03
Payer: MEDICAID

## 2023-07-03 DIAGNOSIS — M25.671 DECREASED RANGE OF MOTION OF RIGHT ANKLE: Primary | ICD-10-CM

## 2023-07-03 DIAGNOSIS — R29.898 DECREASED STRENGTH OF LOWER EXTREMITY: ICD-10-CM

## 2023-07-03 PROCEDURE — 97110 THERAPEUTIC EXERCISES: CPT

## 2023-07-03 NOTE — PROGRESS NOTES
[facility]   Physical Therapy Treatment Note     Name: Rosita Dyson  [facility] Number: [id number]    Therapy Diagnosis:   Encounter Diagnoses   Name Primary?    Decreased range of motion of right ankle Yes    Decreased strength of lower extremity        Physician: Gadiel Barcenas MD    Visit Date: 7/3/2023    Physician Orders: PT Eval and Treat   Medical Diagnosis from Referral: S99.911D (ICD-10-CM) - Injury of right ankle, subsequent encounter  Evaluation Date: 4/11/2023  Authorization Period Expiration: 12/31/2023  Plan of Care Expiration: 6/20/2023  Progress Note Due: 5/11/2023  Visit # / Visits authorized: 13/ 20  FOTO: 0/3     Precautions: Standard      Time In: 9:00  Time Out: 9:58  Total Appointment Time (timed & untimed codes): 58 minutes    FOTO 1st:   FOTO 3rd:  FOTO 10th:      SUBJECTIVE     Pt reports: She has experienced burning pain in her ankle from time to time    She was compliant with home exercise program.  Response to previous treatment: Decreased symptoms   Functional change: Progressing    Pain: 0/10  Location: right ankle     OBJECTIVE     Objective Measures updated at progress report unless specified.      Ankle Right Left     PROM PROM   Plantarflexion 60 60   Dorsiflexion 5 15   Inversion 35 35   Eversion 25 25       Adverse neural tension of tibial and common fib  Treatment       Viola received the treatments listed below:      Therapeutic exercises to develop strength, endurance, ROM, and flexibility for 58 minutes including:     Nerve floss 25x each  Sneaky Lunge 3x10 5s holds  SL rebound 3x15 with, Yellow Ball  Standing Arch doming 2x 10x5s holds  Standing Heel raises 2x20  Single leg stance with foam 4x1 min  Single leg stance with 10# KB 3x20 circles each  2 up 1 down with foam 2x10  Sneaky Lunge 2x10x 5s holds  Single Leg press calf raises 3x20 2.5 cords  Single leg leg press with an arch dome 3x12      Not today  Y balance for treatment 2x10 anterior  Single  leg balance with plantarflexion 3x20s  Toe flexion BTB 3x10  Half Kneeling dorsiflexion RTB 30x  Ankle 4 way BTB 2x15  4 way cone taps 2x10 each  Clams 2x10 5s holds GTB  Single leg bridges 2x10  Supine Tibial Nerve floss 25x  Half kneeling ankle dorsiflexion 20x 3s holds  Towel Curls 2x20 x 3s holds  Single leg bridges 2x12       Manual therapy techniques: Joint mobilizations were applied to the: Right ankle for 0 minutes, including:  Lateral mobilization of Subtalar joint Grade IV  Posterior glide of talus Grade IV  Assessment of ankle mobility     Not today:   Distraction Grade III  Talocrural distraction Manipulation Grade V    Patient Education and Home Exercises     Home Exercises Provided and Patient Education Provided     Education provided:   - HEP  - Role of PT    Written Home Exercises Provided: yes. Exercises were reviewed and Viola was able to demonstrate them prior to the end of the session.  Viola demonstrated good  understanding of the education provided. See EMR under Patient Instructions for exercises provided during therapy sessions    ASSESSMENT     Viola presented with good ankle range of motion with reports of ankle burning at random times that are inconsistent. She was challenged with foot intrinsics and plantarflexors strengthening exercises. She requires cue for balance exercises to engage her foot intrinsics. Plan to discharge after next visit.        Viola Is progressing well towards her goals.     Pt prognosis is Good.     Pt will continue to benefit from skilled outpatient physical therapy to address the deficits listed in the problem list box on initial evaluation, provide pt/family education and to maximize pt's level of independence in the home and community environment.     Pt's spiritual, cultural and educational needs considered and pt agreeable to plan of care and goals.     Anticipated Barriers for therapy: work        Goals:     Short Term Goals (4 Weeks):   1. Pt will be  compliant with HEP to supplement PT in restoring pain free function. (Progressing)  2. Pt will improve single balance to 10 sec to improve balance functional community ambulation (Progressing)  3. Pt will improve impaired LE MMTs by 1/2 grade  to improve strength for functional tasks (Progressing)  4. Pt improve impaired ankle ROM by 5 deg in all planes to improve mobility for normal movement patterns. (Progressing)   Long Term Goals (10 Weeks):  1. Pt will improve FOTO score to </= 37% limited to decrease perceived limitation with mobility  2. Pt will improve single leg balance to >15s improve single leg balance for functional community ambulation  3. Pt will improve impaired LE MMTs by 1 grade to improve strength for functional tasks.  4. Pt improve impaired ankle ROM to WNL in all planes to improve mobility for normal movement patterns.          PLAN   Plan of care Certification: 4/11/2023 to 6/20/2023.    Continue with physical therapy plan of care with emphasis on ankle mobility and stability    Glenn Gallo, PT, DPT  Amrit Cole, SPT

## 2023-07-20 ENCOUNTER — CLINICAL SUPPORT (OUTPATIENT)
Dept: REHABILITATION | Facility: HOSPITAL | Age: 23
End: 2023-07-20
Payer: MEDICAID

## 2023-07-20 DIAGNOSIS — M25.671 DECREASED RANGE OF MOTION OF RIGHT ANKLE: Primary | ICD-10-CM

## 2023-07-20 DIAGNOSIS — R29.898 DECREASED STRENGTH OF LOWER EXTREMITY: ICD-10-CM

## 2023-07-20 PROCEDURE — 97110 THERAPEUTIC EXERCISES: CPT

## 2023-07-20 NOTE — PROGRESS NOTES
[facility]   Physical Therapy Treatment Note     Name: Rosita Dyson  Clinic Number: [id number]    Therapy Diagnosis:   No diagnosis found.      Physician: Gadiel Barcenas MD    Visit Date: 7/20/2023    Physician Orders: PT Eval and Treat   Medical Diagnosis from Referral: S99.911D (ICD-10-CM) - Injury of right ankle, subsequent encounter  Evaluation Date: 4/11/2023  Authorization Period Expiration: 12/31/2023  Plan of Care Expiration: 6/20/2023  Progress Note Due: 5/11/2023  Visit # / Visits authorized: 13/ 20  FOTO: 0/3     Precautions: Standard      Time In: 9:00  Time Out: 9:58  Total Appointment Time (timed & untimed codes): 58 minutes    FOTO 1st:   FOTO 3rd:  FOTO 10th:      SUBJECTIVE     Pt reports: She has experienced burning pain in her ankle from time to time    She was compliant with home exercise program.  Response to previous treatment: Decreased symptoms   Functional change: Progressing    Pain: 0/10  Location: right ankle     OBJECTIVE     Objective Measures updated at progress report unless specified.      Ankle Right Left     PROM PROM   Plantarflexion 60 60   Dorsiflexion 5 15   Inversion 35 35   Eversion 25 25       Adverse neural tension of tibial and common fib  Treatment       Viola received the treatments listed below:      Therapeutic exercises to develop strength, endurance, ROM, and flexibility for 58 minutes including:     Nerve floss 25x each  Sneaky Lunge 3x10 5s holds  SL rebound 3x15 with, Yellow Ball  Standing Arch doming 2x 10x5s holds  Standing Heel raises 2x20  Single leg stance with foam 4x1 min  Single leg stance with 10# KB 3x20 circles each  2 up 1 down with foam 2x10  Sneaky Lunge 2x10x 5s holds  Single Leg press calf raises 3x20 2.5 cords  Single leg leg press with an arch dome 3x12      Not today  Y balance for treatment 2x10 anterior  Single leg balance with plantarflexion 3x20s  Toe flexion BTB 3x10  Half Kneeling dorsiflexion RTB 30x  Ankle 4  way BTB 2x15  4 way cone taps 2x10 each  Clams 2x10 5s holds GTB  Single leg bridges 2x10  Supine Tibial Nerve floss 25x  Half kneeling ankle dorsiflexion 20x 3s holds  Towel Curls 2x20 x 3s holds  Single leg bridges 2x12       Manual therapy techniques: Joint mobilizations were applied to the: Right ankle for 0 minutes, including:  Lateral mobilization of Subtalar joint Grade IV  Posterior glide of talus Grade IV  Assessment of ankle mobility     Not today:   Distraction Grade III  Talocrural distraction Manipulation Grade V    Patient Education and Home Exercises     Home Exercises Provided and Patient Education Provided     Education provided:   - HEP  - Role of PT    Written Home Exercises Provided: yes. Exercises were reviewed and Viola was able to demonstrate them prior to the end of the session.  Viola demonstrated good  understanding of the education provided. See EMR under Patient Instructions for exercises provided during therapy sessions    ASSESSMENT     Viola presented with good ankle range of motion with reports of ankle burning at random times that are inconsistent. She was challenged with foot intrinsics and plantarflexors strengthening exercises. She requires cue for balance exercises to engage her foot intrinsics. Plan to discharge after next visit.        Viola Is progressing well towards her goals.     Pt prognosis is Good.     Pt will continue to benefit from skilled outpatient physical therapy to address the deficits listed in the problem list box on initial evaluation, provide pt/family education and to maximize pt's level of independence in the home and community environment.     Pt's spiritual, cultural and educational needs considered and pt agreeable to plan of care and goals.     Anticipated Barriers for therapy: work        Goals:     Short Term Goals (4 Weeks):   1. Pt will be compliant with HEP to supplement PT in restoring pain free function. (Progressing)  2. Pt will improve single  balance to 10 sec to improve balance functional community ambulation (Progressing)  3. Pt will improve impaired LE MMTs by 1/2 grade  to improve strength for functional tasks (Progressing)  4. Pt improve impaired ankle ROM by 5 deg in all planes to improve mobility for normal movement patterns. (Progressing)   Long Term Goals (10 Weeks):  1. Pt will improve FOTO score to </= 37% limited to decrease perceived limitation with mobility  2. Pt will improve single leg balance to >15s improve single leg balance for functional community ambulation  3. Pt will improve impaired LE MMTs by 1 grade to improve strength for functional tasks.  4. Pt improve impaired ankle ROM to WNL in all planes to improve mobility for normal movement patterns.          PLAN   Plan of care Certification: 4/11/2023 to 6/20/2023.    Continue with physical therapy plan of care with emphasis on ankle mobility and stability    Glenn Gallo, PT, DPT  Amrit Sahaulthorp, SPT

## 2023-07-20 NOTE — PROGRESS NOTES
[facility]   Physical Therapy Treatment Note     Name: Rosita Dyson  [facility] Number: [id number]    Therapy Diagnosis:   Encounter Diagnoses   Name Primary?    Decreased range of motion of right ankle Yes    Decreased strength of lower extremity          Physician: Gadiel Barcenas MD    Visit Date: 7/20/2023    Physician Orders: PT Eval and Treat   Medical Diagnosis from Referral: S99.911D (ICD-10-CM) - Injury of right ankle, subsequent encounter  Evaluation Date: 4/11/2023  Authorization Period Expiration: 12/31/2023  Plan of Care Expiration: 6/20/2023  Progress Note Due: 5/11/2023  Visit # / Visits authorized: 14/ 20  FOTO: 0/3     Precautions: Standard      Time In: 4:01  Time Out: 4:59  Total Appointment Time (timed & untimed codes): 58 minutes    FOTO 1st:   FOTO 3rd:  FOTO 10th:      SUBJECTIVE     Pt reports: She has been really good and has not rolled her ankle since the last visit, but is still having inconsistent burning pains in her ankle.     She was compliant with home exercise program.  Response to previous treatment: Decreased symptoms   Functional change: Progressing    Pain: 0/10  Location: right ankle     OBJECTIVE     Objective Measures updated at progress report unless specified.      Ankle Right Left     PROM PROM   Plantarflexion 60 60   Dorsiflexion 5 15   Inversion 35 35   Eversion 25 25       Adverse neural tension of tibial and common fib  Treatment       Viola received the treatments listed below:      Therapeutic exercises to develop strength, endurance, ROM, and flexibility for 58 minutes including:     Tibial Nerve floss seated 25x each  Sneaky Lunge 3x10 5s holds  SL rebound 3x15 with, Yellow Ball  Single leg Standing Arch doming [id number] holds  Single leg Standing Heel raises 2x20  Single leg stance with foam 4x1 min  Single leg stance with 10# KB 3x20 circles each  2 up 1 down with foam 2x10  Single Leg press calf raises 3x20 2.5 cords  Single leg leg press  with an arch dome 3x12  SLS 30x  Half Kneeling dorsiflexion RTB 20x    Not today  Sneaky Lunge 2x10x 5s holds  Y balance for treatment 2x10 anterior  Single leg balance with plantarflexion 3x20s  Toe flexion BTB 3x10  Ankle 4 way BTB 2x15  Clams 2x10 5s holds GTB  Single leg bridges 2x10  Supine Tibial Nerve floss 25x  Half kneeling ankle dorsiflexion 20x 3s holds  Towel Curls 2x20 x 3s holds  Single leg bridges 2x12       Manual therapy techniques: Joint mobilizations were applied to the: Right ankle for 0 minutes, including:  Lateral mobilization of Subtalar joint Grade IV  Posterior glide of talus Grade IV  Assessment of ankle mobility   Distraction Grade III  1/2 kneeling dorsiflexion mob with movement     Not today:   Talocrural distraction Manipulation Grade V    Patient Education and Home Exercises     Home Exercises Provided and Patient Education Provided     Education provided:   - HEP  - Role of PT    Written Home Exercises Provided: yes. Exercises were reviewed and Viola was able to demonstrate them prior to the end of the session.  Viola demonstrated good  understanding of the education provided. See EMR under Patient Instructions for exercises provided during therapy sessions    ASSESSMENT     Viola presented with a lack of DF in her R ankle compared to her L ankle which was improved with manual therapy. She still has reports of inconsistent burning pains that get better after tibial nerve glides. She tolerated most exercises well but reported burning/sharp pain with single leg rebounders, single leg stance with kettle bell circles, and single leg squats. The pain could have been due to anterior impingement from the lack of DF, so she was given 1/2 kneeling dorsiflexion with a strap exercise and told to complete these at home as well. This pain went away after a rest break and she was able to proceed with more exercises. Pt will continue to progress as tolerated.      Viola Is progressing well towards  her goals.     Pt prognosis is Good.     Pt will continue to benefit from skilled outpatient physical therapy to address the deficits listed in the problem list box on initial evaluation, provide pt/family education and to maximize pt's level of independence in the home and community environment.     Pt's spiritual, cultural and educational needs considered and pt agreeable to plan of care and goals.     Anticipated Barriers for therapy: work        Goals:     Short Term Goals (4 Weeks):   1. Pt will be compliant with HEP to supplement PT in restoring pain free function. (Progressing)  2. Pt will improve single balance to 10 sec to improve balance functional community ambulation (Progressing)  3. Pt will improve impaired LE MMTs by 1/2 grade  to improve strength for functional tasks (Progressing)  4. Pt improve impaired ankle ROM by 5 deg in all planes to improve mobility for normal movement patterns. (Progressing)   Long Term Goals (10 Weeks):  1. Pt will improve FOTO score to </= 37% limited to decrease perceived limitation with mobility  2. Pt will improve single leg balance to >15s improve single leg balance for functional community ambulation  3. Pt will improve impaired LE MMTs by 1 grade to improve strength for functional tasks.  4. Pt improve impaired ankle ROM to WNL in all planes to improve mobility for normal movement patterns.          PLAN   Plan of care Certification: 4/11/2023 to 6/20/2023.    Continue with physical therapy plan of care with emphasis on ankle mobility and stability    Glenn Gallo, PT, DPT  Amrit Cole, SPT

## 2023-08-20 ENCOUNTER — OFFICE VISIT (OUTPATIENT)
Dept: URGENT CARE | Facility: CLINIC | Age: 23
End: 2023-08-20
Payer: MEDICAID

## 2023-08-20 VITALS
TEMPERATURE: 98 F | HEART RATE: 73 BPM | SYSTOLIC BLOOD PRESSURE: 109 MMHG | HEIGHT: 66 IN | WEIGHT: 207 LBS | BODY MASS INDEX: 33.27 KG/M2 | OXYGEN SATURATION: 98 % | RESPIRATION RATE: 18 BRPM | DIASTOLIC BLOOD PRESSURE: 71 MMHG

## 2023-08-20 DIAGNOSIS — J02.9 SORE THROAT: Primary | ICD-10-CM

## 2023-08-20 DIAGNOSIS — J06.9 URI, ACUTE: ICD-10-CM

## 2023-08-20 LAB
CTP QC/QA: YES
CTP QC/QA: YES
MOLECULAR STREP A: NEGATIVE
SARS-COV-2 AG RESP QL IA.RAPID: NEGATIVE

## 2023-08-20 PROCEDURE — 87651 POCT STREP A MOLECULAR: ICD-10-PCS | Mod: QW,S$GLB,, | Performed by: FAMILY MEDICINE

## 2023-08-20 PROCEDURE — 99213 PR OFFICE/OUTPT VISIT, EST, LEVL III, 20-29 MIN: ICD-10-PCS | Mod: S$GLB,,, | Performed by: FAMILY MEDICINE

## 2023-08-20 PROCEDURE — 99213 OFFICE O/P EST LOW 20 MIN: CPT | Mod: S$GLB,,, | Performed by: FAMILY MEDICINE

## 2023-08-20 PROCEDURE — 87651 STREP A DNA AMP PROBE: CPT | Mod: QW,S$GLB,, | Performed by: FAMILY MEDICINE

## 2023-08-20 PROCEDURE — 87811 SARS CORONAVIRUS 2 ANTIGEN POCT, MANUAL READ: ICD-10-PCS | Mod: QW,S$GLB,, | Performed by: FAMILY MEDICINE

## 2023-08-20 PROCEDURE — 87811 SARS-COV-2 COVID19 W/OPTIC: CPT | Mod: QW,S$GLB,, | Performed by: FAMILY MEDICINE

## 2023-08-20 RX ORDER — LORATADINE 10 MG/1
10 TABLET ORAL DAILY
Qty: 30 TABLET | Refills: 2 | Status: SHIPPED | OUTPATIENT
Start: 2023-08-20 | End: 2024-08-19

## 2023-08-20 RX ORDER — LISDEXAMFETAMINE DIMESYLATE 10 MG/1
1 CAPSULE ORAL EVERY MORNING
COMMUNITY
Start: 2023-03-30

## 2023-08-20 RX ORDER — BENZONATATE 100 MG/1
200 CAPSULE ORAL 3 TIMES DAILY PRN
Qty: 30 CAPSULE | Refills: 1 | Status: SHIPPED | OUTPATIENT
Start: 2023-08-20

## 2023-08-20 RX ORDER — FLUTICASONE PROPIONATE 50 MCG
1 SPRAY, SUSPENSION (ML) NASAL DAILY
Qty: 18 G | Refills: 3 | Status: SHIPPED | OUTPATIENT
Start: 2023-08-20 | End: 2023-09-19

## 2023-08-20 NOTE — PROGRESS NOTES
"Subjective:      Patient ID: Rosita Dyson is a 23 y.o. female.    Vitals:  height is 5' 6" (1.676 m) and weight is 93.9 kg (207 lb). Her oral temperature is 97.9 °F (36.6 °C). Her blood pressure is 109/71 and her pulse is 73. Her respiration is 18 and oxygen saturation is 98%.     Chief Complaint: Sore Throat (Ear pain)    23 yr female present with sore throat ear pain in both ears but the pain is mostly on the right side hurt to swallow nasal congestion. Onset a week ago. Treatments at home include Mucinex cold and flu medication Claritin   Denies fever or chills  Works at Day care      Sore Throat   This is a new problem. The current episode started 1 to 4 weeks ago. The problem has been gradually worsening. There has been no fever. The pain is at a severity of 8/10. The pain is moderate. Associated symptoms include congestion, coughing, ear pain, headaches, neck pain, swollen glands and trouble swallowing. Pertinent negatives include no plugged ear sensation. She has had no exposure to strep or mono. Treatments tried: Mucinex cold and flu medication Claritin. The treatment provided no relief.       HENT:  Positive for ear pain, congestion, sore throat and trouble swallowing.    Neck: Positive for neck pain.   Respiratory:  Positive for cough.    Neurological:  Positive for headaches.      Objective:     Physical Exam   Constitutional: She is oriented to person, place, and time. She appears well-developed. She is cooperative.   HENT:   Head: Normocephalic and atraumatic.   Ears:   Right Ear: Hearing, tympanic membrane, external ear and ear canal normal.   Left Ear: Hearing, tympanic membrane, external ear and ear canal normal.   Nose: Nose normal. No mucosal edema or nasal deformity. No epistaxis. Right sinus exhibits no maxillary sinus tenderness and no frontal sinus tenderness. Left sinus exhibits no maxillary sinus tenderness and no frontal sinus tenderness.   Mouth/Throat: Uvula is midline and mucous " membranes are normal. Mucous membranes are moist. No trismus in the jaw. Normal dentition. No uvula swelling. Posterior oropharyngeal erythema present. No oropharyngeal exudate. Oropharynx is clear.   Eyes: Conjunctivae and lids are normal. Pupils are equal, round, and reactive to light. Extraocular movement intact   Neck: Trachea normal and phonation normal. Neck supple.   Cardiovascular: Normal rate, regular rhythm, normal heart sounds and normal pulses.   Pulmonary/Chest: Effort normal and breath sounds normal.   Abdominal: Normal appearance and bowel sounds are normal. Soft.   Musculoskeletal: Normal range of motion.         General: Normal range of motion.   Neurological: She is alert and oriented to person, place, and time. She exhibits normal muscle tone.   Skin: Skin is warm, dry and intact.   Psychiatric: Her speech is normal and behavior is normal. Judgment and thought content normal.   Nursing note and vitals reviewed.      Assessment:     1. Sore throat    2. URI, acute        Plan:       Sore throat  -     SARS Coronavirus 2 Antigen, POCT Manual Read  -     POCT Strep A, Molecular    URI, acute    Other orders  -     loratadine (CLARITIN) 10 mg tablet; Take 1 tablet (10 mg total) by mouth once daily.  Dispense: 30 tablet; Refill: 2         Sudafed 120 mg one bid  Pt advised to gargle with warm salt water q 4 hours  Tylenol alternate with Ibuprofen 2 po q 6 hours prn  Clraitin one daily      Reassurance          Results for orders placed or performed in visit on 08/20/23   POCT Strep A, Molecular   Result Value Ref Range    Molecular Strep A, POC Negative Negative     Acceptable Yes              Results for orders placed or performed in visit on 08/20/23   SARS Coronavirus 2 Antigen, POCT Manual Read   Result Value Ref Range    SARS Coronavirus 2 Antigen Negative Negative     Acceptable Yes    POCT Strep A, Molecular   Result Value Ref Range    Molecular Strep A, POC  Negative Negative     Acceptable Yes

## 2023-10-17 ENCOUNTER — NURSE TRIAGE (OUTPATIENT)
Dept: ADMINISTRATIVE | Facility: CLINIC | Age: 23
End: 2023-10-17
Payer: MEDICAID

## 2023-10-17 ENCOUNTER — HOSPITAL ENCOUNTER (EMERGENCY)
Facility: HOSPITAL | Age: 23
Discharge: HOME | End: 2023-10-18
Attending: EMERGENCY MEDICINE
Payer: MEDICAID

## 2023-10-17 DIAGNOSIS — R10.33 PERIUMBILICAL ABDOMINAL PAIN: ICD-10-CM

## 2023-10-17 DIAGNOSIS — N30.00 ACUTE CYSTITIS WITHOUT HEMATURIA: Primary | ICD-10-CM

## 2023-10-17 DIAGNOSIS — K52.9 ENTERITIS: ICD-10-CM

## 2023-10-17 LAB
B-HCG UR QL: NEGATIVE
BASOPHILS # BLD AUTO: 0.02 K/UL (ref 0–0.2)
BASOPHILS NFR BLD: 0.3 % (ref 0–1.9)
CTP QC/QA: YES
DIFFERENTIAL METHOD: ABNORMAL
EOSINOPHIL # BLD AUTO: 0.1 K/UL (ref 0–0.5)
EOSINOPHIL NFR BLD: 1.1 % (ref 0–8)
ERYTHROCYTE [DISTWIDTH] IN BLOOD BY AUTOMATED COUNT: 12.9 % (ref 11.5–14.5)
HCT VFR BLD AUTO: 37.9 % (ref 37–48.5)
HGB BLD-MCNC: 12.6 G/DL (ref 12–16)
IMM GRANULOCYTES # BLD AUTO: 0.01 K/UL (ref 0–0.04)
IMM GRANULOCYTES NFR BLD AUTO: 0.1 % (ref 0–0.5)
LYMPHOCYTES # BLD AUTO: 1.2 K/UL (ref 1–4.8)
LYMPHOCYTES NFR BLD: 16.6 % (ref 18–48)
MCH RBC QN AUTO: 31.3 PG (ref 27–31)
MCHC RBC AUTO-ENTMCNC: 33.2 G/DL (ref 32–36)
MCV RBC AUTO: 94 FL (ref 82–98)
MONOCYTES # BLD AUTO: 0.3 K/UL (ref 0.3–1)
MONOCYTES NFR BLD: 3.9 % (ref 4–15)
NEUTROPHILS # BLD AUTO: 5.7 K/UL (ref 1.8–7.7)
NEUTROPHILS NFR BLD: 78 % (ref 38–73)
NRBC BLD-RTO: 0 /100 WBC
PLATELET # BLD AUTO: 195 K/UL (ref 150–450)
PMV BLD AUTO: 12.7 FL (ref 9.2–12.9)
RBC # BLD AUTO: 4.02 M/UL (ref 4–5.4)
SARS-COV-2 RDRP RESP QL NAA+PROBE: NEGATIVE
WBC # BLD AUTO: 7.24 K/UL (ref 3.9–12.7)

## 2023-10-17 PROCEDURE — 63600175 PHARM REV CODE 636 W HCPCS: Performed by: EMERGENCY MEDICINE

## 2023-10-17 PROCEDURE — 85025 COMPLETE CBC W/AUTO DIFF WBC: CPT | Performed by: EMERGENCY MEDICINE

## 2023-10-17 PROCEDURE — 96374 THER/PROPH/DIAG INJ IV PUSH: CPT

## 2023-10-17 PROCEDURE — 81025 URINE PREGNANCY TEST: CPT | Performed by: EMERGENCY MEDICINE

## 2023-10-17 PROCEDURE — 87086 URINE CULTURE/COLONY COUNT: CPT | Performed by: EMERGENCY MEDICINE

## 2023-10-17 PROCEDURE — 99285 EMERGENCY DEPT VISIT HI MDM: CPT | Mod: 25

## 2023-10-17 PROCEDURE — 83690 ASSAY OF LIPASE: CPT | Performed by: EMERGENCY MEDICINE

## 2023-10-17 PROCEDURE — 81001 URINALYSIS AUTO W/SCOPE: CPT | Performed by: EMERGENCY MEDICINE

## 2023-10-17 PROCEDURE — 86803 HEPATITIS C AB TEST: CPT | Performed by: PHYSICIAN ASSISTANT

## 2023-10-17 PROCEDURE — 96361 HYDRATE IV INFUSION ADD-ON: CPT

## 2023-10-17 PROCEDURE — U0002 COVID-19 LAB TEST NON-CDC: HCPCS | Performed by: EMERGENCY MEDICINE

## 2023-10-17 PROCEDURE — 87389 HIV-1 AG W/HIV-1&-2 AB AG IA: CPT | Performed by: PHYSICIAN ASSISTANT

## 2023-10-17 PROCEDURE — 25000003 PHARM REV CODE 250: Performed by: EMERGENCY MEDICINE

## 2023-10-17 PROCEDURE — 80053 COMPREHEN METABOLIC PANEL: CPT | Performed by: EMERGENCY MEDICINE

## 2023-10-17 RX ORDER — MAG HYDROX/ALUMINUM HYD/SIMETH 200-200-20
5 SUSPENSION, ORAL (FINAL DOSE FORM) ORAL
Status: COMPLETED | OUTPATIENT
Start: 2023-10-17 | End: 2023-10-17

## 2023-10-17 RX ORDER — ACETAMINOPHEN 500 MG
1000 TABLET ORAL
Status: COMPLETED | OUTPATIENT
Start: 2023-10-17 | End: 2023-10-17

## 2023-10-17 RX ORDER — PROCHLORPERAZINE EDISYLATE 5 MG/ML
10 INJECTION INTRAMUSCULAR; INTRAVENOUS
Status: COMPLETED | OUTPATIENT
Start: 2023-10-17 | End: 2023-10-17

## 2023-10-17 RX ADMIN — ALUMINUM HYDROXIDE, MAGNESIUM HYDROXIDE, AND SIMETHICONE 5 ML: 200; 200; 20 SUSPENSION ORAL at 10:10

## 2023-10-17 RX ADMIN — PROCHLORPERAZINE EDISYLATE 10 MG: 5 INJECTION INTRAMUSCULAR; INTRAVENOUS at 11:10

## 2023-10-17 RX ADMIN — ACETAMINOPHEN 1000 MG: 500 TABLET ORAL at 11:10

## 2023-10-17 RX ADMIN — IOHEXOL 100 ML: 350 INJECTION, SOLUTION INTRAVENOUS at 11:10

## 2023-10-17 RX ADMIN — SODIUM CHLORIDE 1000 ML: 9 INJECTION, SOLUTION INTRAVENOUS at 11:10

## 2023-10-18 VITALS
SYSTOLIC BLOOD PRESSURE: 114 MMHG | RESPIRATION RATE: 16 BRPM | HEART RATE: 83 BPM | BODY MASS INDEX: 29.86 KG/M2 | TEMPERATURE: 99 F | OXYGEN SATURATION: 95 % | DIASTOLIC BLOOD PRESSURE: 60 MMHG | WEIGHT: 185 LBS

## 2023-10-18 LAB
ALBUMIN SERPL BCP-MCNC: 4.2 G/DL (ref 3.5–5.2)
ALP SERPL-CCNC: 77 U/L (ref 55–135)
ALT SERPL W/O P-5'-P-CCNC: <5 U/L (ref 10–44)
ANION GAP SERPL CALC-SCNC: 11 MMOL/L (ref 8–16)
AST SERPL-CCNC: 20 U/L (ref 10–40)
BACTERIA #/AREA URNS AUTO: ABNORMAL /HPF
BILIRUB SERPL-MCNC: 0.6 MG/DL (ref 0.1–1)
BILIRUB UR QL STRIP: NEGATIVE
BUN SERPL-MCNC: 9 MG/DL (ref 6–20)
CALCIUM SERPL-MCNC: 9.6 MG/DL (ref 8.7–10.5)
CHLORIDE SERPL-SCNC: 107 MMOL/L (ref 95–110)
CLARITY UR REFRACT.AUTO: ABNORMAL
CO2 SERPL-SCNC: 21 MMOL/L (ref 23–29)
COLOR UR AUTO: YELLOW
CREAT SERPL-MCNC: 0.8 MG/DL (ref 0.5–1.4)
EST. GFR  (NO RACE VARIABLE): >60 ML/MIN/1.73 M^2
GLUCOSE SERPL-MCNC: 103 MG/DL (ref 70–110)
GLUCOSE UR QL STRIP: NEGATIVE
HCV AB SERPL QL IA: NORMAL
HGB UR QL STRIP: NEGATIVE
HIV 1+2 AB+HIV1 P24 AG SERPL QL IA: NORMAL
HYALINE CASTS UR QL AUTO: 0 /LPF
KETONES UR QL STRIP: ABNORMAL
LEUKOCYTE ESTERASE UR QL STRIP: ABNORMAL
LIPASE SERPL-CCNC: 12 U/L (ref 4–60)
MICROSCOPIC COMMENT: ABNORMAL
NITRITE UR QL STRIP: NEGATIVE
PH UR STRIP: 6 [PH] (ref 5–8)
POTASSIUM SERPL-SCNC: 4 MMOL/L (ref 3.5–5.1)
PROT SERPL-MCNC: 7.8 G/DL (ref 6–8.4)
PROT UR QL STRIP: ABNORMAL
RBC #/AREA URNS AUTO: 11 /HPF (ref 0–4)
SODIUM SERPL-SCNC: 139 MMOL/L (ref 136–145)
SP GR UR STRIP: 1.03 (ref 1–1.03)
SQUAMOUS #/AREA URNS AUTO: 7 /HPF
URN SPEC COLLECT METH UR: ABNORMAL
WBC #/AREA URNS AUTO: 27 /HPF (ref 0–5)

## 2023-10-18 PROCEDURE — 96375 TX/PRO/DX INJ NEW DRUG ADDON: CPT

## 2023-10-18 PROCEDURE — 25500020 PHARM REV CODE 255: Performed by: EMERGENCY MEDICINE

## 2023-10-18 PROCEDURE — 63600175 PHARM REV CODE 636 W HCPCS: Performed by: EMERGENCY MEDICINE

## 2023-10-18 RX ORDER — DIPHENHYDRAMINE HYDROCHLORIDE 50 MG/ML
25 INJECTION INTRAMUSCULAR; INTRAVENOUS
Status: COMPLETED | OUTPATIENT
Start: 2023-10-18 | End: 2023-10-18

## 2023-10-18 RX ORDER — NITROFURANTOIN 25; 75 MG/1; MG/1
100 CAPSULE ORAL 2 TIMES DAILY
Qty: 10 CAPSULE | Refills: 0 | Status: SHIPPED | OUTPATIENT
Start: 2023-10-18 | End: 2023-10-23

## 2023-10-18 RX ADMIN — DIPHENHYDRAMINE HYDROCHLORIDE 25 MG: 50 INJECTION, SOLUTION INTRAMUSCULAR; INTRAVENOUS at 12:10

## 2023-10-18 NOTE — ED TRIAGE NOTES
Patient reports that began with sharp epigastric abdominal pain. States that she has been nauseas as well with two episodes of vomiting. States that it began today and has been intermittent. Patient endorses nausea still.

## 2023-10-18 NOTE — TELEPHONE ENCOUNTER
Pt calls c/o sharp pain in her stomach area below her ribs that has been occurring intermittently all day today. Pt has thrown up twice from how severe the pain has gotten. Pt reports pain when defecating. She describes the pain as similar to a gallstone attack. (Pt had her gallbladder removed.)     Care Advice recommends that pt be seen in ED now. Pt verbalizes understanding and is instructed to call back with any new/worsening sxs, questions, or concerns.   Reason for Disposition   [1] SEVERE pain (e.g., excruciating) AND [2] present > 1 hour    Additional Information   Negative: Shock suspected (e.g., cold/pale/clammy skin, too weak to stand, low BP, rapid pulse)   Negative: Difficult to awaken or acting confused (e.g., disoriented, slurred speech)   Negative: Passed out (i.e., lost consciousness, collapsed and was not responding)   Negative: Sounds like a life-threatening emergency to the triager    Protocols used: Abdominal Pain - Female-A-AH

## 2023-10-18 NOTE — ED PROVIDER NOTES
CC: Abdominal Pain (Pt c/o sharp, intermittent epigastric pain since this AM with 2 episodes of N/V)      History provided by:   Patient     HPI: Rosita Dyson is a 23 y.o. year old female who presents to the ED for epigastric pain    Nausea, headache today at work followed by sharp epigastric pain, intermittent, sometimes feels like it is radiating to the back, it feels like GB attacks but she has hx of cholecystectomy    + vomiting x 2  No URI symptoms, no fever  BM x 2-3 loose,no black or blood  No fever    No known worsening or alleviating factors  No vag discharge,+ sexually active  LMP Oct 4-Oct 8    PHYSICAL EXAM:  Vitals:    10/17/23 1957   BP: 127/83   Pulse: 90   Resp: 17   Temp: 98.2 °F (36.8 °C)     VS: triage VS reviewed    general: comfortable, in no acute distress, pleasant, well nourished  HENT: neck symmetric, trachea midline  Eyes: PERRL,no conjunctival injection and symmetrical eyelids  CV: RRR, no  murmurs, no rubs, no gallops, no LE edema  Resp: comfortable breathing, speaks in full sentences, CTAB, no wheezing, no crackles, no ronchi  ABD:  soft, ND, no masses, + normal BS, + mild periumbilical pain  Renal: No CVAT  Neuro: AAO x 3, 5/5 strength x 4 extremities, sensation intact, face symmetric, speech normal  MSK: NC/AT, extremities w/out deformity   Skin: warm, dry      MDM:  Rosita Dyson is a 23 y.o. year old female who presents to the ED for abd pain with loose stools, vomiting and associated headache  No uri symptoms  No fever  No UTI like symptoms    DDX includes but not limited to: viral infection, gastroenteritis, appendicitis, gastritis/PUD >> uti, pid    Labs ordered and reviewed:   UPT negative   UA trace ketones +1 protein   Urine micro with 27 WBCs and rare bacteria  CMP no gross abnormalities   CBC normal white count platelets and hemoglobin   Lipase normal   COVID negative     Medication given in the ED:  Maalox, Tylenol, Compazine, IV fluid    CT abdomen pelvis (ordered  and reviewed): Multiple thickened bowel loops of the small bowel, suggestive of enteritis.  No organized fluid collection or significant surrounding inflammatory changes.   Imagings independently visualized: n/a      Results discussed w/ the patient. Given prescription for Macrobid but advised to wait until U cs is available and to start the antibiotic only if the U cs is positive.  Discussed diet and care with enteritis  The patient has been carefully educated about symptoms and conditions that should prompt immediate return to the ED for recheck or further evaluation. Told to return immediately for any new or worsening or progressive symptoms.    IMPRESSION:  1.) Enteritis      Dispo: Discharge    Critical Care Time: N/A          Past Medical History:   Diagnosis Date    Depression      Past Surgical History:   Procedure Laterality Date    CHOLECYSTECTOMY      TONSILLECTOMY      WISDOM TOOTH EXTRACTION Bilateral 2016     Family History   Problem Relation Age of Onset    Hypertension Paternal Grandmother     No Known Problems Mother     No Known Problems Father     Amblyopia Neg Hx     Blindness Neg Hx     Cataracts Neg Hx     Glaucoma Neg Hx     Macular degeneration Neg Hx     Retinal detachment Neg Hx     Strabismus Neg Hx     Asthma Neg Hx     Birth defects Neg Hx     Cancer Neg Hx     Chromosomal disorder Neg Hx     Diabetes Neg Hx     Early death Neg Hx     Heart disease Neg Hx     Hyperlipidemia Neg Hx     Mental illness Neg Hx     Seizures Neg Hx     Thyroid disease Neg Hx     Other Neg Hx      No current facility-administered medications on file prior to encounter.     Current Outpatient Medications on File Prior to Encounter   Medication Sig Dispense Refill    albuterol (PROVENTIL/VENTOLIN HFA) 90 mcg/actuation inhaler Inhale 1-2 puffs into the lungs every 4 (four) hours as needed for Wheezing or Shortness of Breath. Rescue (Patient not taking: Reported on 3/28/2023) 6.7 g 0    benzonatate (TESSALON  PERLES) 100 MG capsule Take 2 capsules (200 mg total) by mouth 3 (three) times daily as needed for Cough. 30 capsule 1    HYDROcodone-acetaminophen (NORCO) 5-325 mg per tablet Take 1 tablet by mouth every 6 (six) hours as needed for Pain. (Patient not taking: Reported on 3/28/2023) 9 tablet 0    loratadine (CLARITIN) 10 mg tablet Take 1 tablet (10 mg total) by mouth once daily. 30 tablet 2    ondansetron (ZOFRAN-ODT) 4 MG TbDL Take 2 tablets (8 mg total) by mouth every 8 (eight) hours as needed (nausea/vomiting). (Patient not taking: Reported on 3/28/2023) 15 tablet 0    venlafaxine HCl (EFFEXOR XR ORAL) Effexor XR Take No date recorded No form recorded No frequency recorded No route recorded No set duration recorded No set duration amount recorded suspended No dosage strength recorded No dosage strength units of measure recorded      VYVANSE 10 mg Cap Take 1 capsule by mouth every morning.       Patient has no known allergies.  Social History     Socioeconomic History    Marital status: Single   Tobacco Use    Smoking status: Every Day     Types: Vaping with nicotine    Smokeless tobacco: Never   Substance and Sexual Activity    Alcohol use: Yes     Alcohol/week: 1.0 - 3.0 standard drink of alcohol     Types: 1 - 3 Standard drinks or equivalent per week     Comment: socially    Drug use: No    Sexual activity: Yes     Partners: Male     Birth control/protection: OCP, Condom                 DATA & INTERVENTIONS:    LABS reviewed:  Labs Reviewed   HIV 1 / 2 ANTIBODY   HEPATITIS C ANTIBODY       RADIOLOGY reviewed:  Imaging Results    None         MEDICATIONS/FLUIDS:  Medications - No data to display           Carlee Cassidy MD  10/18/23 4707

## 2023-10-19 LAB
BACTERIA UR CULT: NORMAL
BACTERIA UR CULT: NORMAL

## 2024-06-14 ENCOUNTER — ON-DEMAND VIRTUAL (OUTPATIENT)
Dept: URGENT CARE | Facility: CLINIC | Age: 24
End: 2024-06-14

## 2024-06-14 DIAGNOSIS — R55 NEAR SYNCOPE: Primary | ICD-10-CM

## 2024-06-14 PROCEDURE — 99212 OFFICE O/P EST SF 10 MIN: CPT | Mod: 95,,, | Performed by: FAMILY MEDICINE

## 2024-06-14 NOTE — PROGRESS NOTES
Subjective:      Patient ID: Rostia Dyson is a 24 y.o. female.    Vitals:  vitals were not taken for this visit.     Chief Complaint: Fever      Visit Type: TELE AUDIOVISUAL    Present with the patient at the time of consultation: TELEMED PRESENT WITH PATIENT: None    Past Medical History:   Diagnosis Date    Depression      Past Surgical History:   Procedure Laterality Date    CHOLECYSTECTOMY      TONSILLECTOMY      WISDOM TOOTH EXTRACTION Bilateral 2016     Review of patient's allergies indicates:  No Known Allergies  Current Outpatient Medications on File Prior to Visit   Medication Sig Dispense Refill    albuterol (PROVENTIL/VENTOLIN HFA) 90 mcg/actuation inhaler Inhale 1-2 puffs into the lungs every 4 (four) hours as needed for Wheezing or Shortness of Breath. Rescue (Patient not taking: Reported on 3/28/2023) 6.7 g 0    benzonatate (TESSALON PERLES) 100 MG capsule Take 2 capsules (200 mg total) by mouth 3 (three) times daily as needed for Cough. 30 capsule 1    HYDROcodone-acetaminophen (NORCO) 5-325 mg per tablet Take 1 tablet by mouth every 6 (six) hours as needed for Pain. (Patient not taking: Reported on 3/28/2023) 9 tablet 0    loratadine (CLARITIN) 10 mg tablet Take 1 tablet (10 mg total) by mouth once daily. 30 tablet 2    ondansetron (ZOFRAN-ODT) 4 MG TbDL Take 2 tablets (8 mg total) by mouth every 8 (eight) hours as needed (nausea/vomiting). (Patient not taking: Reported on 3/28/2023) 15 tablet 0    venlafaxine HCl (EFFEXOR XR ORAL) Effexor XR Take No date recorded No form recorded No frequency recorded No route recorded No set duration recorded No set duration amount recorded suspended No dosage strength recorded No dosage strength units of measure recorded      VYVANSE 10 mg Cap Take 1 capsule by mouth every morning.       No current facility-administered medications on file prior to visit.     Family History   Problem Relation Name Age of Onset    Hypertension Paternal Grandmother      No  Known Problems Mother      No Known Problems Father      Amblyopia Neg Hx      Blindness Neg Hx      Cataracts Neg Hx      Glaucoma Neg Hx      Macular degeneration Neg Hx      Retinal detachment Neg Hx      Strabismus Neg Hx      Asthma Neg Hx      Birth defects Neg Hx      Cancer Neg Hx      Chromosomal disorder Neg Hx      Diabetes Neg Hx      Early death Neg Hx      Heart disease Neg Hx      Hyperlipidemia Neg Hx      Mental illness Neg Hx      Seizures Neg Hx      Thyroid disease Neg Hx      Other Neg Hx             [facility] Peq Odvv Intake    6/14/2024 10:54 AM CDT - Filed by Patient   What is your current physical address in the event of a medical emergency? [address] [id number]   Are you able to take your vital signs? No   Please attach any relevant images or files          Patient Location: home    Fever   This is a new problem. Episode onset: This morning felt flushed suddenly.  Went to sit down.  Felt tingly.  Temp was 99.9.  Sat for a while until her brother picked her up. Progression since onset: felt jittery afterwards.  Now feeling. The maximum temperature noted was 99 to 99.9 F. Associated symptoms include headaches and nausea. Pertinent negatives include no abdominal pain, congestion, coughing, diarrhea, ear pain, muscle aches, not playful when afebrile, rash, sleepiness, sore throat, urinary pain, vomiting or wheezing. Associated symptoms comments: Stays well hydrated.  No change in vision.  Balance is normal.  No spinning sensation. No chest pain,  No SOB. Treatments tried: resting, ice and fluids. The treatment provided moderate relief.       Constitution: Positive for fever.   HENT:  Negative for ear pain, congestion and sore throat.    Respiratory:  Negative for cough and wheezing.    Gastrointestinal:  Positive for nausea. Negative for abdominal pain, vomiting and diarrhea.   Skin:  Negative for rash.   Neurological:  Positive for headaches.        Objective:   The physical exam was  conducted virtually.  Physical Exam   Constitutional: She is oriented to person, place, and time.   HENT:   Head: Normocephalic.   Ears:   Right Ear: External ear normal.   Left Ear: External ear normal.   Nose: Nose normal.   Eyes: Pupils are equal, round, and reactive to light. Extraocular movement intact   Pulmonary/Chest: Effort normal. No respiratory distress.   Abdominal: Normal appearance.   Neurological: no focal deficit. She is alert and oriented to person, place, and time. No cranial nerve deficit.   Psychiatric: Her behavior is normal.       Assessment:     1. Near syncope        Plan:       Near syncope       - Go to the ER if symptoms reccur  if any chest, pain, SOB or vision changes.     - Rest,  eat a healthy diet with protein and vegetables,  drink plenty of fluids.

## 2024-10-19 ENCOUNTER — ON-DEMAND VIRTUAL (OUTPATIENT)
Dept: URGENT CARE | Facility: CLINIC | Age: 24
End: 2024-10-19

## 2024-10-19 DIAGNOSIS — M54.2 NECK PAIN: ICD-10-CM
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
## 2024-10-19 DIAGNOSIS — M54.9 BACK PAIN, UNSPECIFIED BACK LOCATION, UNSPECIFIED BACK PAIN LATERALITY, UNSPECIFIED CHRONICITY: ICD-10-CM

## 2024-10-19 PROCEDURE — 99213 OFFICE O/P EST LOW 20 MIN: CPT | Mod: 95,,, | Performed by: NURSE PRACTITIONER

## 2024-10-19 RX ORDER — IBUPROFEN 800 MG/1
800 TABLET ORAL 3 TIMES DAILY
Qty: 30 TABLET | Refills: 0 | Status: SHIPPED | OUTPATIENT
Start: 2024-10-19
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
## 2024-10-20 ENCOUNTER — OFFICE VISIT (OUTPATIENT)
Dept: URGENT CARE | Facility: CLINIC | Age: 24
End: 2024-10-20
Payer: MEDICAID

## 2024-10-20 VITALS
RESPIRATION RATE: 17 BRPM | BODY MASS INDEX: 25.07 KG/M2 | SYSTOLIC BLOOD PRESSURE: 115 MMHG | OXYGEN SATURATION: 97 % | DIASTOLIC BLOOD PRESSURE: 72 MMHG | HEIGHT: 66 IN | HEART RATE: 73 BPM | TEMPERATURE: 98 F | WEIGHT: 156 LBS
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
## 2024-10-20 DIAGNOSIS — S16.1XXA NECK MUSCLE STRAIN, INITIAL ENCOUNTER: ICD-10-CM

## 2024-10-20 DIAGNOSIS — R51.9 ACUTE NONINTRACTABLE HEADACHE, UNSPECIFIED HEADACHE TYPE: ICD-10-CM

## 2024-10-20 DIAGNOSIS — M79.10 MYALGIA: ICD-10-CM

## 2024-10-20 PROCEDURE — 99213 OFFICE O/P EST LOW 20 MIN: CPT | Mod: S$GLB,,,

## 2024-10-20 RX ORDER — KETOROLAC TROMETHAMINE 30 MG/ML
30 INJECTION, SOLUTION INTRAMUSCULAR; INTRAVENOUS
Status: COMPLETED | OUTPATIENT
Start: 2024-10-20 | End: 2024-10-20

## 2024-10-20 RX ORDER — DICLOFENAC SODIUM 50 MG/1
50 TABLET, DELAYED RELEASE ORAL 2 TIMES DAILY
Qty: 20 TABLET | Refills: 0 | Status: SHIPPED | OUTPATIENT
Start: 2024-10-20 | End: 2024-10-30

## 2024-10-20 RX ORDER — CYCLOBENZAPRINE HCL 10 MG
10 TABLET ORAL 3 TIMES DAILY PRN
Qty: 30 TABLET | Refills: 0 | Status: SHIPPED | OUTPATIENT
Start: 2024-10-20 | End: 2024-10-30

## 2024-10-20 RX ORDER — LISDEXAMFETAMINE DIMESYLATE 30 MG/1
30 CAPSULE ORAL EVERY MORNING
COMMUNITY
Start: 2024-09-04

## 2024-10-20 RX ADMIN — KETOROLAC TROMETHAMINE 30 MG: 30 INJECTION, SOLUTION INTRAMUSCULAR; INTRAVENOUS at 11:10

## 2024-10-20 NOTE — PATIENT INSTRUCTIONS
Pain: Diclofenac every 12 hours as needed. May alternate with tylenol. Do not take with other NSAIDs (Ibuprofen, Advil, Aleve, etc)  Muscle Strain: Flexeril up to 3 times daily as needed. May cause drowsiness, take first dose at night.   Monitor for worsening headache, dizziness, nausea, vomiting, new or worsening symptoms   Please drink plenty of fluids.  Please get plenty of rest.  Please return here or go to the Emergency Department for any concerns or worsening of condition.  If you were prescribed a narcotic medication, do not drive or operate heavy equipment or machinery while taking these medications.  If you were not prescribed an anti-inflammatory medication, and if you do not have any history of stomach/intestinal ulcers, or kidney disease, or are not taking a blood thinner such as Coumadin, Plavix, Pradaxa, Eloquis, or Xaralta for example, it is OK to take over the counter Ibuprofen or Advil or Motrin or Aleve as directed.  Do not take these medications on an empty stomach.  Rest, ice, compression and elevation to the affected joint or limb as needed.  Please follow up with your primary care doctor or specialist as needed.    If you  smoke, please stop smoking.
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
## 2024-11-25 ENCOUNTER — HOSPITAL ENCOUNTER (OUTPATIENT)
Dept: RADIOLOGY | Facility: HOSPITAL | Age: 24
Discharge: HOME | End: 2024-11-25
Payer: MEDICAID

## 2024-11-25 ENCOUNTER — OFFICE VISIT (OUTPATIENT)
Dept: FAMILY MEDICINE | Facility: HOSPITAL | Age: 24
End: 2024-11-25
Payer: MEDICAID

## 2024-11-25 VITALS
BODY MASS INDEX: 31.2 KG/M2 | SYSTOLIC BLOOD PRESSURE: 118 MMHG | DIASTOLIC BLOOD PRESSURE: 76 MMHG | HEIGHT: 60 IN | OXYGEN SATURATION: 100 % | HEART RATE: 72 BPM | WEIGHT: 158.94 LBS

## 2024-11-25 DIAGNOSIS — S19.80XS CHRONIC PAIN AFTER WHIPLASH INJURY TO NECK: ICD-10-CM

## 2024-11-25 DIAGNOSIS — G89.21 CHRONIC PAIN AFTER WHIPLASH INJURY TO NECK: ICD-10-CM

## 2024-11-25 DIAGNOSIS — F90.9 ATTENTION DEFICIT HYPERACTIVITY DISORDER (ADHD), UNSPECIFIED ADHD TYPE: Primary | ICD-10-CM

## 2024-11-25 PROCEDURE — 72040 X-RAY EXAM NECK SPINE 2-3 VW: CPT | Mod: TC,FY

## 2024-11-25 PROCEDURE — 99214 OFFICE O/P EST MOD 30 MIN: CPT | Mod: 25

## 2024-11-25 PROCEDURE — 72040 X-RAY EXAM NECK SPINE 2-3 VW: CPT | Mod: 26,,, | Performed by: RADIOLOGY

## 2024-11-25 RX ORDER — LISDEXAMFETAMINE DIMESYLATE 60 MG/1
CAPSULE ORAL
COMMUNITY
Start: 2023-03-30

## 2024-11-25 RX ORDER — LIDOCAINE 50 MG/G
1 PATCH TOPICAL DAILY
Qty: 30 PATCH | Refills: 2 | Status: SHIPPED | OUTPATIENT
Start: 2024-11-25 | End: 2025-02-23
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
## 2024-11-29 NOTE — PROGRESS NOTES
I assume primary medical responsibility for this patient. I have reviewed the history, physical, and assessement & treatment plan with the resident and agree that the care is reasonable and necessary. This service has been performed by a resident without the presence of a teaching physician under the primary care exception. If necessary, an addendum of additional findings or evaluation beyond the resident documentation will be noted below.    Patient will need to bring in documentation of evaluation for ADHD or return to prior provider for medication. Patient expressed understanding.     Jyoti Ruiz MD    [facility]

## 2025-01-15 ENCOUNTER — OFFICE VISIT (OUTPATIENT)
Dept: FAMILY MEDICINE | Facility: HOSPITAL | Age: 25
End: 2025-01-15
Payer: MEDICAID

## 2025-01-15 VITALS
RESPIRATION RATE: 18 BRPM | SYSTOLIC BLOOD PRESSURE: 115 MMHG | WEIGHT: 164.44 LBS | BODY MASS INDEX: 26.43 KG/M2 | DIASTOLIC BLOOD PRESSURE: 69 MMHG | HEIGHT: 66 IN | OXYGEN SATURATION: 99 % | HEART RATE: 69 BPM

## 2025-01-15 DIAGNOSIS — Z30.09 ENCOUNTER FOR COUNSELING REGARDING CONTRACEPTION: ICD-10-CM

## 2025-01-15 DIAGNOSIS — Z12.4 ENCOUNTER FOR PAPANICOLAOU SMEAR OF CERVIX: Primary | ICD-10-CM

## 2025-01-15 DIAGNOSIS — Z20.2 ENCOUNTER FOR ASSESSMENT OF STD EXPOSURE: ICD-10-CM

## 2025-01-15 LAB
B-HCG UR QL: NEGATIVE
CTP QC/QA: YES

## 2025-01-15 PROCEDURE — 81025 URINE PREGNANCY TEST: CPT

## 2025-01-15 PROCEDURE — 99213 OFFICE O/P EST LOW 20 MIN: CPT

## 2025-01-15 RX ORDER — NORGESTIMATE AND ETHINYL ESTRADIOL 0.25-0.035
1 KIT ORAL DAILY
Qty: 90 TABLET | Refills: 3 | Status: SHIPPED | OUTPATIENT
Start: 2025-01-15 | End: 2026-01-15

## 2025-01-15 NOTE — PROGRESS NOTES
[facility]    Subjective:     Rosita Dyson is a 24 y.o. year old female with PMHx of ADHD who presents to clinic for pap smear.    Last pap smear done in May 2021, which was unsatisfactory. Due for one. Pap in July 2019 recommendation was repeat in 1 year.    She denies any complaints today. Denies any vaginal discharge or abnormal bleeding. She was on OCPs previously a few years ago. Tolerated well with no side effects. Wants to get back on it.     Patient Active Problem List    Diagnosis Date Noted    Decreased ROM of ankle 04/11/2023    Decreased strength of lower extremity 04/11/2023    Vaginal bleeding 12/03/2022    Impaired functional mobility and activity tolerance 08/26/2021    Decreased strength of trunk and back 08/26/2021    Mild episode of recurrent major depressive disorder 05/02/2018    Sore throat 01/10/2018    Tonsillar hypertrophy 03/27/2017    Pharyngitis 03/27/2017    Symptomatic cholelithiasis 04/21/2016        Review of patient's allergies indicates:  No Known Allergies     Past Medical History:   Diagnosis Date    Depression       Past Surgical History:   Procedure Laterality Date    CHOLECYSTECTOMY      TONSILLECTOMY      WISDOM TOOTH EXTRACTION Bilateral 2016      Family History   Problem Relation Name Age of Onset    Hypertension Paternal Grandmother      No Known Problems Mother      No Known Problems Father      Amblyopia Neg Hx      Blindness Neg Hx      Cataracts Neg Hx      Glaucoma Neg Hx      Macular degeneration Neg Hx      Retinal detachment Neg Hx      Strabismus Neg Hx      Asthma Neg Hx      Birth defects Neg Hx      Cancer Neg Hx      Chromosomal disorder Neg Hx      Diabetes Neg Hx      Early death Neg Hx      Heart disease Neg Hx      Hyperlipidemia Neg Hx      Mental illness Neg Hx      Seizures Neg Hx      Thyroid disease Neg Hx      Other Neg Hx        Social History     Tobacco Use    Smoking status: Every Day     Types: Vaping with nicotine    Smokeless  "tobacco: Never   Substance Use Topics    Alcohol use: Yes     Alcohol/week: 1.0 - 3.0 standard drink of alcohol     Types: 1 - 3 Standard drinks or equivalent per week     Comment: socially        Objective:     Vitals:    01/15/25 0804   BP: 115/69   Pulse: 69   Resp: 18   SpO2: 99%   Weight: 74.6 kg (164 lb 7.4 oz)   Height: 5' 6" (1.676 m)     Body mass index is 26.55 kg/m².    GYN: External genitalia wnl, no apparent masses or lesions, bimanual examination performed and tolerated by the patient, no cervical motion tenderness. Speculum examination performed and tolerated by the patient. Cervical os closed and without obvious lesions. Minimal white vaginal discharge appreciated. pap test obtained, pt tolerated procedure well. STI vaginal swabs obtained, pt tolerated procedure well.     Pelvic examination performed by Nayeli Campbell MD in the presence of a chaperone.     Assessment/Plan:     Rosita Dyson is a 24 y.o. year old female who presents to clinic for pap smear.    1. Encounter for Papanicolaou smear of cervix (Primary)  - Pap collected today, sending off results  - Will call patient with results if abnormal    2. Encounter for assessment of STD exposure  - GC swab done today    3. Encounter for counseling regarding contraception  - POCT Urine Pregnancy  - NEGATIVE  - Started patient back on norgestimate-ethinyl estradioL (ORTHO-CYCLEN) 0.25-35 mg-mcg per tablet; Take 1 tablet by mouth once daily.  Dispense: 90 tablet; Refill: 3. Discussed risks and benefits of medication with patient. Discussed common side effects of medication. Handout also given on the medication. All questions answered.  - The use of the oral contraceptive has been fully discussed with the patient. This includes the proper method to initiate and continue the pills, the need for regular compliance to ensure adequate contraceptive effect, the physiology which make the pill effective, the instructions for what to do in event of a missed " pill, and warnings about anticipated minor side effects such as breakthrough spotting, nausea, breast tenderness, weight changes, acne, headaches, etc.  She has been told of the more serious potential side effects such as MI, stroke, and deep vein thrombosis, all of which are very unlikely.  She has been asked to report any signs of such serious problems immediately.  She should back up the pill with a condom during any cycle in which antibiotics are prescribed, and during the first cycle as well. The need for additional protection, such as a condom, to prevent exposure to sexually transmitted diseases has also been discussed- the patient has been clearly reminded that OCP's cannot protect her against diseases such as HIV and others. She understands and wishes to take the medication as prescribed.    Follow-up: annually or sooner if needed    Case discussed with staff: ROSAURA Campbell MD  [facility], PGY-3  01/15/2025

## 2025-01-27 ENCOUNTER — TELEPHONE (OUTPATIENT)
Dept: FAMILY MEDICINE | Facility: HOSPITAL | Age: 25
End: 2025-01-27
Payer: MEDICAID

## 2025-01-27 DIAGNOSIS — R87.610 ATYPICAL SQUAMOUS CELLS OF UNDETERMINED SIGNIFICANCE (ASCUS) ON PAPANICOLAOU SMEAR OF CERVIX: ICD-10-CM

## 2025-01-27 DIAGNOSIS — N76.0 BV (BACTERIAL VAGINOSIS): ICD-10-CM

## 2025-01-27 DIAGNOSIS — B96.89 BV (BACTERIAL VAGINOSIS): ICD-10-CM

## 2025-01-27 DIAGNOSIS — A59.9 TRICHIMONIASIS: Primary | ICD-10-CM

## 2025-01-27 RX ORDER — METRONIDAZOLE 500 MG/1
500 TABLET ORAL 2 TIMES DAILY
Qty: 14 TABLET | Refills: 0 | Status: SHIPPED | OUTPATIENT
Start: 2025-01-27 | End: 2025-02-03

## 2025-01-27 NOTE — TELEPHONE ENCOUNTER
Attempted to call patient regarding her pap smear results as well as +BV and trich. Will need treatment w/ Flagyl 500 mg BID. Pap smear with ASCUS. Repeat results will depend on HPV status which is likely negative as it did not reflex. Will discuss w/ patient.    Nayeli Campbell MD  [facility], PGY-3  01/27/2025

## 2025-03-21 ENCOUNTER — PATIENT MESSAGE (OUTPATIENT)
Dept: FAMILY MEDICINE | Facility: HOSPITAL | Age: 25
End: 2025-03-21
Payer: MEDICAID

## 2025-08-22 ENCOUNTER — OFFICE VISIT (OUTPATIENT)
Dept: PRIMARY CARE CLINIC | Facility: CLINIC | Age: 25
End: 2025-08-22
Payer: MEDICAID

## 2025-08-22 VITALS — TEMPERATURE: 97 F | HEART RATE: 87 BPM | BODY MASS INDEX: 26.55 KG/M2 | HEIGHT: 66 IN | OXYGEN SATURATION: 100 %

## 2025-08-22 DIAGNOSIS — Z13.1 SCREENING FOR DIABETES MELLITUS: ICD-10-CM

## 2025-08-22 DIAGNOSIS — Z11.59 ENCOUNTER FOR HEPATITIS C SCREENING TEST FOR LOW RISK PATIENT: ICD-10-CM

## 2025-08-22 DIAGNOSIS — R53.83 FATIGUE, UNSPECIFIED TYPE: ICD-10-CM

## 2025-08-22 DIAGNOSIS — Z11.3 SCREEN FOR STD (SEXUALLY TRANSMITTED DISEASE): ICD-10-CM

## 2025-08-22 DIAGNOSIS — Z00.00 HEALTHCARE MAINTENANCE: ICD-10-CM

## 2025-08-22 DIAGNOSIS — R06.02 SHORTNESS OF BREATH: Primary | ICD-10-CM

## 2025-08-22 DIAGNOSIS — Z11.4 SCREENING FOR HIV (HUMAN IMMUNODEFICIENCY VIRUS): ICD-10-CM

## 2025-08-22 DIAGNOSIS — Z13.29 SCREENING FOR THYROID DISORDER: ICD-10-CM

## 2025-08-22 DIAGNOSIS — Z13.220 SCREENING CHOLESTEROL LEVEL: ICD-10-CM

## 2025-08-22 LAB
B-HCG UR QL: NEGATIVE
BILIRUB UR QL STRIP.AUTO: NEGATIVE
CLARITY UR: CLEAR
COLOR UR AUTO: COLORLESS
CTP QC/QA: YES
GLUCOSE UR QL STRIP: NEGATIVE
HGB UR QL STRIP: NEGATIVE
KETONES UR QL STRIP: NEGATIVE
LEUKOCYTE ESTERASE UR QL STRIP: NEGATIVE
NITRITE UR QL STRIP: NEGATIVE
OHS QRS DURATION: 94 MS
OHS QTC CALCULATION: 397 MS
PH UR STRIP: 8 [PH]
PROT UR QL STRIP: NEGATIVE
SP GR UR STRIP: 1.01
UROBILINOGEN UR STRIP-ACNC: NEGATIVE EU/DL

## 2025-08-22 PROCEDURE — 99999 PR PBB SHADOW E&M-EST. PATIENT-LVL III: CPT | Mod: PBBFAC,,, | Performed by: NURSE PRACTITIONER

## 2025-08-22 PROCEDURE — 81003 URINALYSIS AUTO W/O SCOPE: CPT | Performed by: NURSE PRACTITIONER

## 2025-08-22 PROCEDURE — 99213 OFFICE O/P EST LOW 20 MIN: CPT | Mod: PBBFAC,PN | Performed by: NURSE PRACTITIONER

## 2025-08-27 ENCOUNTER — OFFICE VISIT (OUTPATIENT)
Dept: PRIMARY CARE CLINIC | Facility: CLINIC | Age: 25
End: 2025-08-27
Payer: MEDICAID

## 2025-08-27 VITALS
DIASTOLIC BLOOD PRESSURE: 70 MMHG | HEART RATE: 66 BPM | TEMPERATURE: 98 F | RESPIRATION RATE: 17 BRPM | OXYGEN SATURATION: 100 % | BODY MASS INDEX: 30.51 KG/M2 | WEIGHT: 189 LBS | SYSTOLIC BLOOD PRESSURE: 109 MMHG

## 2025-08-27 DIAGNOSIS — Z71.2 ENCOUNTER TO DISCUSS TEST RESULTS: Primary | ICD-10-CM

## 2025-08-27 DIAGNOSIS — E66.9 OBESITY (BMI 30-39.9): ICD-10-CM

## 2025-08-27 DIAGNOSIS — F90.9 ATTENTION DEFICIT HYPERACTIVITY DISORDER (ADHD), UNSPECIFIED ADHD TYPE: ICD-10-CM

## 2025-08-27 DIAGNOSIS — D64.9 ANEMIA, UNSPECIFIED TYPE: ICD-10-CM

## 2025-08-27 PROCEDURE — 3008F BODY MASS INDEX DOCD: CPT | Mod: CPTII,,, | Performed by: NURSE PRACTITIONER

## 2025-08-27 PROCEDURE — 1159F MED LIST DOCD IN RCRD: CPT | Mod: CPTII,,, | Performed by: NURSE PRACTITIONER

## 2025-08-27 PROCEDURE — 99999 PR PBB SHADOW E&M-EST. PATIENT-LVL V: CPT | Mod: PBBFAC,,, | Performed by: NURSE PRACTITIONER

## 2025-08-27 PROCEDURE — 3044F HG A1C LEVEL LT 7.0%: CPT | Mod: CPTII,,, | Performed by: NURSE PRACTITIONER

## 2025-08-27 PROCEDURE — 3078F DIAST BP <80 MM HG: CPT | Mod: CPTII,,, | Performed by: NURSE PRACTITIONER

## 2025-08-27 PROCEDURE — 3074F SYST BP LT 130 MM HG: CPT | Mod: CPTII,,, | Performed by: NURSE PRACTITIONER

## 2025-08-27 PROCEDURE — 99215 OFFICE O/P EST HI 40 MIN: CPT | Mod: PBBFAC,PN | Performed by: NURSE PRACTITIONER

## 2025-08-27 PROCEDURE — 99214 OFFICE O/P EST MOD 30 MIN: CPT | Mod: S$PBB,,, | Performed by: NURSE PRACTITIONER

## (undated) DEVICE — CATH SUCTION 14FR CONTROL

## (undated) DEVICE — SEE MEDLINE ITEM 152487

## (undated) DEVICE — SPONGE TONSIL MEDIUM

## (undated) DEVICE — SEE MEDLINE ITEM 152496

## (undated) DEVICE — CATH ALL PUR URTHL RR 10FR

## (undated) DEVICE — PACK TONSIL CUSTOM

## (undated) DEVICE — HANDPIECE EVAC 70 EXTRA